# Patient Record
Sex: FEMALE | Race: WHITE | Employment: OTHER | URBAN - METROPOLITAN AREA
[De-identification: names, ages, dates, MRNs, and addresses within clinical notes are randomized per-mention and may not be internally consistent; named-entity substitution may affect disease eponyms.]

---

## 2017-09-14 PROCEDURE — 82607 VITAMIN B-12: CPT | Performed by: UROLOGY

## 2017-09-14 PROCEDURE — 82746 ASSAY OF FOLIC ACID SERUM: CPT | Performed by: UROLOGY

## 2018-06-19 PROCEDURE — 82570 ASSAY OF URINE CREATININE: CPT | Performed by: INTERNAL MEDICINE

## 2018-06-19 PROCEDURE — 36415 COLL VENOUS BLD VENIPUNCTURE: CPT | Performed by: INTERNAL MEDICINE

## 2018-06-19 PROCEDURE — 87086 URINE CULTURE/COLONY COUNT: CPT | Performed by: INTERNAL MEDICINE

## 2018-06-19 PROCEDURE — 84156 ASSAY OF PROTEIN URINE: CPT | Performed by: INTERNAL MEDICINE

## 2018-06-19 PROCEDURE — 81001 URINALYSIS AUTO W/SCOPE: CPT | Performed by: INTERNAL MEDICINE

## 2018-06-19 PROCEDURE — 82043 UR ALBUMIN QUANTITATIVE: CPT | Performed by: INTERNAL MEDICINE

## 2018-07-09 PROCEDURE — 81001 URINALYSIS AUTO W/SCOPE: CPT | Performed by: INTERNAL MEDICINE

## 2018-07-09 PROCEDURE — 87086 URINE CULTURE/COLONY COUNT: CPT | Performed by: INTERNAL MEDICINE

## 2018-09-18 PROCEDURE — 82565 ASSAY OF CREATININE: CPT | Performed by: INTERNAL MEDICINE

## 2018-09-18 PROCEDURE — 84100 ASSAY OF PHOSPHORUS: CPT | Performed by: INTERNAL MEDICINE

## 2018-09-18 PROCEDURE — 82310 ASSAY OF CALCIUM: CPT | Performed by: INTERNAL MEDICINE

## 2018-09-18 PROCEDURE — 83970 ASSAY OF PARATHORMONE: CPT | Performed by: INTERNAL MEDICINE

## 2019-06-06 PROCEDURE — 87086 URINE CULTURE/COLONY COUNT: CPT | Performed by: INTERNAL MEDICINE

## 2019-06-06 PROCEDURE — 81001 URINALYSIS AUTO W/SCOPE: CPT | Performed by: INTERNAL MEDICINE

## 2019-06-06 PROCEDURE — 82610 CYSTATIN C: CPT | Performed by: INTERNAL MEDICINE

## 2019-06-06 PROCEDURE — 83970 ASSAY OF PARATHORMONE: CPT | Performed by: INTERNAL MEDICINE

## 2019-06-06 PROCEDURE — 82310 ASSAY OF CALCIUM: CPT | Performed by: INTERNAL MEDICINE

## 2019-06-06 PROCEDURE — 84100 ASSAY OF PHOSPHORUS: CPT | Performed by: INTERNAL MEDICINE

## 2019-06-06 PROCEDURE — 84156 ASSAY OF PROTEIN URINE: CPT | Performed by: INTERNAL MEDICINE

## 2019-06-06 PROCEDURE — 82565 ASSAY OF CREATININE: CPT | Performed by: INTERNAL MEDICINE

## 2019-07-11 PROCEDURE — 82330 ASSAY OF CALCIUM: CPT | Performed by: INTERNAL MEDICINE

## 2019-08-26 PROCEDURE — 82565 ASSAY OF CREATININE: CPT | Performed by: INTERNAL MEDICINE

## 2019-08-26 PROCEDURE — 82310 ASSAY OF CALCIUM: CPT | Performed by: INTERNAL MEDICINE

## 2019-08-26 PROCEDURE — 83970 ASSAY OF PARATHORMONE: CPT | Performed by: INTERNAL MEDICINE

## 2019-08-26 PROCEDURE — 82330 ASSAY OF CALCIUM: CPT | Performed by: INTERNAL MEDICINE

## 2019-08-26 PROCEDURE — 84100 ASSAY OF PHOSPHORUS: CPT | Performed by: INTERNAL MEDICINE

## 2019-08-26 PROCEDURE — 36415 COLL VENOUS BLD VENIPUNCTURE: CPT | Performed by: INTERNAL MEDICINE

## 2019-09-09 PROCEDURE — 82330 ASSAY OF CALCIUM: CPT | Performed by: INTERNAL MEDICINE

## 2019-09-09 PROCEDURE — 83970 ASSAY OF PARATHORMONE: CPT | Performed by: INTERNAL MEDICINE

## 2019-09-09 PROCEDURE — 84100 ASSAY OF PHOSPHORUS: CPT | Performed by: INTERNAL MEDICINE

## 2019-09-09 PROCEDURE — 82310 ASSAY OF CALCIUM: CPT | Performed by: INTERNAL MEDICINE

## 2019-09-09 PROCEDURE — 82565 ASSAY OF CREATININE: CPT | Performed by: INTERNAL MEDICINE

## 2019-11-18 PROBLEM — E21.0 HYPERPARATHYROIDISM, PRIMARY (HCC): Status: ACTIVE | Noted: 2019-11-18

## 2019-11-21 PROBLEM — M85.80 OSTEOPENIA: Status: ACTIVE | Noted: 2019-11-21

## 2019-12-17 PROBLEM — D69.2 PURPURA (HCC): Status: ACTIVE | Noted: 2019-12-17

## 2019-12-17 PROBLEM — Z85.828 HISTORY OF BASAL CELL CANCER: Status: ACTIVE | Noted: 2019-12-17

## 2019-12-17 PROBLEM — D69.2 PURPURA: Status: ACTIVE | Noted: 2019-12-17

## 2019-12-17 PROBLEM — Z86.007 HISTORY OF SQUAMOUS CELL CARCINOMA IN SITU: Status: ACTIVE | Noted: 2019-12-17

## 2020-11-30 ENCOUNTER — LAB ENCOUNTER (OUTPATIENT)
Dept: LAB | Age: 70
End: 2020-11-30
Attending: DERMATOLOGY
Payer: MEDICARE

## 2020-11-30 DIAGNOSIS — D48.5 NEOPLASM OF UNCERTAIN BEHAVIOR OF SKIN: ICD-10-CM

## 2020-11-30 PROCEDURE — 88342 IMHCHEM/IMCYTCHM 1ST ANTB: CPT

## 2020-12-10 PROBLEM — Z85.828 HISTORY OF BASAL CELL CANCER: Status: RESOLVED | Noted: 2019-12-17 | Resolved: 2020-12-10

## 2021-04-12 PROBLEM — Z86.79 HISTORY OF HIGH BLOOD PRESSURE: Status: ACTIVE | Noted: 2021-04-12

## 2022-08-29 ENCOUNTER — HOSPITAL ENCOUNTER (EMERGENCY)
Facility: HOSPITAL | Age: 72
Discharge: HOME | End: 2022-08-29
Attending: EMERGENCY MEDICINE
Payer: MEDICARE

## 2022-08-29 VITALS
HEIGHT: 65 IN | WEIGHT: 138 LBS | SYSTOLIC BLOOD PRESSURE: 149 MMHG | HEART RATE: 84 BPM | BODY MASS INDEX: 22.99 KG/M2 | TEMPERATURE: 97 F | DIASTOLIC BLOOD PRESSURE: 85 MMHG | RESPIRATION RATE: 18 BRPM | OXYGEN SATURATION: 99 %

## 2022-08-29 DIAGNOSIS — R79.89 ELEVATED TSH: ICD-10-CM

## 2022-08-29 DIAGNOSIS — F32.A DEPRESSION, UNSPECIFIED DEPRESSION TYPE: Primary | ICD-10-CM

## 2022-08-29 LAB
ALBUMIN SERPL-MCNC: 3.9 G/DL (ref 3.4–5)
ALBUMIN/GLOB SERPL: 1 {RATIO} (ref 1–2)
ALP LIVER SERPL-CCNC: 77 U/L
ALT SERPL-CCNC: 23 U/L
AMPHET UR QL SCN: NEGATIVE
ANION GAP SERPL CALC-SCNC: 5 MMOL/L (ref 0–18)
APAP SERPL-MCNC: <2 UG/ML (ref 10–30)
AST SERPL-CCNC: 14 U/L (ref 15–37)
BASOPHILS # BLD AUTO: 0.06 X10(3) UL (ref 0–0.2)
BASOPHILS NFR BLD AUTO: 0.8 %
BENZODIAZ UR QL SCN: NEGATIVE
BILIRUB SERPL-MCNC: 0.6 MG/DL (ref 0.1–2)
BILIRUB UR QL STRIP.AUTO: NEGATIVE
BUN BLD-MCNC: 21 MG/DL (ref 7–18)
CALCIUM BLD-MCNC: 9.8 MG/DL (ref 8.5–10.1)
CANNABINOIDS UR QL SCN: NEGATIVE
CHLORIDE SERPL-SCNC: 103 MMOL/L (ref 98–112)
CLARITY UR REFRACT.AUTO: CLEAR
CO2 SERPL-SCNC: 25 MMOL/L (ref 21–32)
COCAINE UR QL: NEGATIVE
COLOR UR AUTO: YELLOW
CREAT BLD-MCNC: 1.14 MG/DL
CREAT UR-SCNC: 76 MG/DL
EOSINOPHIL # BLD AUTO: 0.07 X10(3) UL (ref 0–0.7)
EOSINOPHIL NFR BLD AUTO: 0.9 %
ERYTHROCYTE [DISTWIDTH] IN BLOOD BY AUTOMATED COUNT: 13.1 %
ETHANOL SERPL-MCNC: <3 MG/DL (ref ?–3)
FLUAV + FLUBV RNA SPEC NAA+PROBE: NEGATIVE
FLUAV + FLUBV RNA SPEC NAA+PROBE: NEGATIVE
GFR SERPLBLD BASED ON 1.73 SQ M-ARVRAT: 51 ML/MIN/1.73M2 (ref 60–?)
GLOBULIN PLAS-MCNC: 4 G/DL (ref 2.8–4.4)
GLUCOSE BLD-MCNC: 110 MG/DL (ref 70–99)
GLUCOSE UR STRIP.AUTO-MCNC: NEGATIVE MG/DL
HCT VFR BLD AUTO: 46.5 %
HGB BLD-MCNC: 15.4 G/DL
HYALINE CASTS #/AREA URNS AUTO: PRESENT /LPF
IMM GRANULOCYTES # BLD AUTO: 0.01 X10(3) UL (ref 0–1)
IMM GRANULOCYTES NFR BLD: 0.1 %
KETONES UR STRIP.AUTO-MCNC: NEGATIVE MG/DL
LYMPHOCYTES # BLD AUTO: 1.38 X10(3) UL (ref 1–4)
LYMPHOCYTES NFR BLD AUTO: 17.4 %
MCH RBC QN AUTO: 33.2 PG (ref 26–34)
MCHC RBC AUTO-ENTMCNC: 33.1 G/DL (ref 31–37)
MCV RBC AUTO: 100.2 FL
MDMA UR QL SCN: NEGATIVE
MONOCYTES # BLD AUTO: 0.7 X10(3) UL (ref 0.1–1)
MONOCYTES NFR BLD AUTO: 8.8 %
NEUTROPHILS # BLD AUTO: 5.72 X10 (3) UL (ref 1.5–7.7)
NEUTROPHILS # BLD AUTO: 5.72 X10(3) UL (ref 1.5–7.7)
NEUTROPHILS NFR BLD AUTO: 72 %
NITRITE UR QL STRIP.AUTO: POSITIVE
OPIATES UR QL SCN: NEGATIVE
OSMOLALITY SERPL CALC.SUM OF ELEC: 280 MOSM/KG (ref 275–295)
OXYCODONE UR QL SCN: NEGATIVE
PH UR STRIP.AUTO: 7 [PH] (ref 5–8)
PLATELET # BLD AUTO: 193 10(3)UL (ref 150–450)
POTASSIUM SERPL-SCNC: 3.6 MMOL/L (ref 3.5–5.1)
PROT SERPL-MCNC: 7.9 G/DL (ref 6.4–8.2)
PROT UR STRIP.AUTO-MCNC: NEGATIVE MG/DL
RBC # BLD AUTO: 4.64 X10(6)UL
RSV RNA SPEC NAA+PROBE: NEGATIVE
SALICYLATES SERPL-MCNC: <1.7 MG/DL (ref 2.8–20)
SARS-COV-2 RNA RESP QL NAA+PROBE: NOT DETECTED
SODIUM SERPL-SCNC: 133 MMOL/L (ref 136–145)
SP GR UR STRIP.AUTO: 1.02 (ref 1–1.03)
UROBILINOGEN UR STRIP.AUTO-MCNC: 0.2 MG/DL
WBC # BLD AUTO: 7.9 X10(3) UL (ref 4–11)

## 2022-08-29 PROCEDURE — 85025 COMPLETE CBC W/AUTO DIFF WBC: CPT | Performed by: EMERGENCY MEDICINE

## 2022-08-29 PROCEDURE — 99285 EMERGENCY DEPT VISIT HI MDM: CPT

## 2022-08-29 PROCEDURE — 81001 URINALYSIS AUTO W/SCOPE: CPT | Performed by: EMERGENCY MEDICINE

## 2022-08-29 PROCEDURE — 87077 CULTURE AEROBIC IDENTIFY: CPT | Performed by: EMERGENCY MEDICINE

## 2022-08-29 PROCEDURE — 87086 URINE CULTURE/COLONY COUNT: CPT | Performed by: EMERGENCY MEDICINE

## 2022-08-29 PROCEDURE — 80307 DRUG TEST PRSMV CHEM ANLYZR: CPT | Performed by: EMERGENCY MEDICINE

## 2022-08-29 PROCEDURE — 36415 COLL VENOUS BLD VENIPUNCTURE: CPT

## 2022-08-29 PROCEDURE — 87186 SC STD MICRODIL/AGAR DIL: CPT | Performed by: EMERGENCY MEDICINE

## 2022-08-29 PROCEDURE — 0241U SARS-COV-2/FLU A AND B/RSV BY PCR (GENEXPERT): CPT | Performed by: EMERGENCY MEDICINE

## 2022-08-29 PROCEDURE — 80179 DRUG ASSAY SALICYLATE: CPT | Performed by: EMERGENCY MEDICINE

## 2022-08-29 PROCEDURE — 80307 DRUG TEST PRSMV CHEM ANLYZR: CPT

## 2022-08-29 PROCEDURE — 80143 DRUG ASSAY ACETAMINOPHEN: CPT | Performed by: EMERGENCY MEDICINE

## 2022-08-29 PROCEDURE — 80053 COMPREHEN METABOLIC PANEL: CPT | Performed by: EMERGENCY MEDICINE

## 2022-08-29 PROCEDURE — 81015 MICROSCOPIC EXAM OF URINE: CPT | Performed by: EMERGENCY MEDICINE

## 2022-08-29 PROCEDURE — 82077 ASSAY SPEC XCP UR&BREATH IA: CPT | Performed by: EMERGENCY MEDICINE

## 2022-08-29 PROCEDURE — 0241U SARS-COV-2/FLU A AND B/RSV BY PCR (GENEXPERT): CPT

## 2022-08-29 PROCEDURE — 85025 COMPLETE CBC W/AUTO DIFF WBC: CPT

## 2022-08-29 RX ORDER — NITROFURANTOIN 25; 75 MG/1; MG/1
100 CAPSULE ORAL 2 TIMES DAILY
Qty: 10 CAPSULE | Refills: 0 | Status: SHIPPED | OUTPATIENT
Start: 2022-08-29 | End: 2022-08-29

## 2022-08-29 RX ORDER — SERTRALINE HYDROCHLORIDE 25 MG/1
25 TABLET, FILM COATED ORAL DAILY
COMMUNITY
Start: 2022-08-26

## 2022-08-29 NOTE — ED NOTES
Writer met with pt and introduced self/role. Pt presents as anxious and depressed. Endorses SI, but no plan and intent. Denies HI, SIB. Pt states persistent feelings of depression. Pt open to assessment. Writer explained available unit resources post assessment if interested. Pt advised to notify staff if in need of anything.  Pt ate lunch; writer removed empty tray upon request.

## 2022-08-29 NOTE — ED INITIAL ASSESSMENT (HPI)
Patient come to ER with , patient states that for the last 2 months has felt \"down\". Also states \"I feel like I want to die\". SI, No HI, no plan. Father committed suicide and sister has bipolar disorder.

## 2022-08-29 NOTE — ED QUICK NOTES
MD Bird Martinez at bedside with patient. Patient handed discharged AVS with outpatient referrals,  and patient verbalized understanding.

## 2022-08-31 RX ORDER — SULFAMETHOXAZOLE AND TRIMETHOPRIM 800; 160 MG/1; MG/1
1 TABLET ORAL 2 TIMES DAILY
Qty: 10 TABLET | Refills: 0 | Status: SHIPPED | OUTPATIENT
Start: 2022-08-31 | End: 2022-09-01

## 2022-09-01 RX ORDER — SULFAMETHOXAZOLE AND TRIMETHOPRIM 800; 160 MG/1; MG/1
1 TABLET ORAL 2 TIMES DAILY
Qty: 10 TABLET | Refills: 0 | Status: SHIPPED | OUTPATIENT
Start: 2022-09-01 | End: 2022-09-06

## 2022-10-19 ENCOUNTER — HOSPITAL ENCOUNTER (EMERGENCY)
Facility: HOSPITAL | Age: 72
Discharge: HOME | End: 2022-10-19
Attending: EMERGENCY MEDICINE
Payer: MEDICARE

## 2022-10-19 VITALS
BODY MASS INDEX: 22.49 KG/M2 | HEIGHT: 65 IN | OXYGEN SATURATION: 98 % | DIASTOLIC BLOOD PRESSURE: 81 MMHG | WEIGHT: 135 LBS | SYSTOLIC BLOOD PRESSURE: 147 MMHG | HEART RATE: 84 BPM | TEMPERATURE: 97 F | RESPIRATION RATE: 19 BRPM

## 2022-10-19 DIAGNOSIS — R55 SYNCOPE, NEAR: Primary | ICD-10-CM

## 2022-10-19 LAB
ALBUMIN SERPL-MCNC: 3.4 G/DL (ref 3.4–5)
ALBUMIN/GLOB SERPL: 0.8 {RATIO} (ref 1–2)
ALP LIVER SERPL-CCNC: 68 U/L
ALT SERPL-CCNC: 23 U/L
ANION GAP SERPL CALC-SCNC: 7 MMOL/L (ref 0–18)
AST SERPL-CCNC: 11 U/L (ref 15–37)
ATRIAL RATE: 83 BPM
BASOPHILS # BLD AUTO: 0.05 X10(3) UL (ref 0–0.2)
BASOPHILS NFR BLD AUTO: 0.4 %
BILIRUB SERPL-MCNC: 0.6 MG/DL (ref 0.1–2)
BUN BLD-MCNC: 27 MG/DL (ref 7–18)
CALCIUM BLD-MCNC: 9.8 MG/DL (ref 8.5–10.1)
CHLORIDE SERPL-SCNC: 108 MMOL/L (ref 98–112)
CO2 SERPL-SCNC: 23 MMOL/L (ref 21–32)
CREAT BLD-MCNC: 1.37 MG/DL
EOSINOPHIL # BLD AUTO: 0.06 X10(3) UL (ref 0–0.7)
EOSINOPHIL NFR BLD AUTO: 0.5 %
ERYTHROCYTE [DISTWIDTH] IN BLOOD BY AUTOMATED COUNT: 13.3 %
GFR SERPLBLD BASED ON 1.73 SQ M-ARVRAT: 41 ML/MIN/1.73M2 (ref 60–?)
GLOBULIN PLAS-MCNC: 4.3 G/DL (ref 2.8–4.4)
GLUCOSE BLD-MCNC: 121 MG/DL (ref 70–99)
GLUCOSE BLD-MCNC: 129 MG/DL (ref 70–99)
HCT VFR BLD AUTO: 43 %
HGB BLD-MCNC: 15.1 G/DL
IMM GRANULOCYTES # BLD AUTO: 0.05 X10(3) UL (ref 0–1)
IMM GRANULOCYTES NFR BLD: 0.4 %
LYMPHOCYTES # BLD AUTO: 0.93 X10(3) UL (ref 1–4)
LYMPHOCYTES NFR BLD AUTO: 7.5 %
MCH RBC QN AUTO: 35 PG (ref 26–34)
MCHC RBC AUTO-ENTMCNC: 35.1 G/DL (ref 31–37)
MCV RBC AUTO: 99.5 FL
MONOCYTES # BLD AUTO: 0.97 X10(3) UL (ref 0.1–1)
MONOCYTES NFR BLD AUTO: 7.9 %
NEUTROPHILS # BLD AUTO: 10.29 X10 (3) UL (ref 1.5–7.7)
NEUTROPHILS # BLD AUTO: 10.29 X10(3) UL (ref 1.5–7.7)
NEUTROPHILS NFR BLD AUTO: 83.3 %
OSMOLALITY SERPL CALC.SUM OF ELEC: 293 MOSM/KG (ref 275–295)
P AXIS: 61 DEGREES
P-R INTERVAL: 126 MS
PLATELET # BLD AUTO: 182 10(3)UL (ref 150–450)
POTASSIUM SERPL-SCNC: 4.2 MMOL/L (ref 3.5–5.1)
PROT SERPL-MCNC: 7.7 G/DL (ref 6.4–8.2)
Q-T INTERVAL: 376 MS
QRS DURATION: 72 MS
QTC CALCULATION (BEZET): 441 MS
R AXIS: 40 DEGREES
RBC # BLD AUTO: 4.32 X10(6)UL
SARS-COV-2 RNA RESP QL NAA+PROBE: NOT DETECTED
SODIUM SERPL-SCNC: 138 MMOL/L (ref 136–145)
T AXIS: 61 DEGREES
TROPONIN I HIGH SENSITIVITY: 8 NG/L
VENTRICULAR RATE: 83 BPM
WBC # BLD AUTO: 12.4 X10(3) UL (ref 4–11)

## 2022-10-19 PROCEDURE — 99285 EMERGENCY DEPT VISIT HI MDM: CPT

## 2022-10-19 PROCEDURE — 93010 ELECTROCARDIOGRAM REPORT: CPT | Performed by: EMERGENCY MEDICINE

## 2022-10-19 PROCEDURE — 96360 HYDRATION IV INFUSION INIT: CPT

## 2022-10-19 PROCEDURE — 82962 GLUCOSE BLOOD TEST: CPT

## 2022-10-19 PROCEDURE — 93005 ELECTROCARDIOGRAM TRACING: CPT

## 2022-10-19 PROCEDURE — 84484 ASSAY OF TROPONIN QUANT: CPT | Performed by: EMERGENCY MEDICINE

## 2022-10-19 PROCEDURE — 80053 COMPREHEN METABOLIC PANEL: CPT | Performed by: EMERGENCY MEDICINE

## 2022-10-19 PROCEDURE — 85025 COMPLETE CBC W/AUTO DIFF WBC: CPT | Performed by: EMERGENCY MEDICINE

## 2022-10-19 NOTE — ED INITIAL ASSESSMENT (HPI)
Patient received via [facility] for intermittant dizziness and disorientation x 2 days. Patient denies injury or additional symptoms. Patient denies pain.

## 2022-10-20 ENCOUNTER — APPOINTMENT (OUTPATIENT)
Dept: CT IMAGING | Facility: HOSPITAL | Age: 72
End: 2022-10-20
Attending: EMERGENCY MEDICINE
Payer: MEDICARE

## 2022-10-20 ENCOUNTER — APPOINTMENT (OUTPATIENT)
Dept: CV DIAGNOSTICS | Facility: HOSPITAL | Age: 72
End: 2022-10-20
Attending: HOSPITALIST
Payer: MEDICARE

## 2022-10-20 ENCOUNTER — HOSPITAL ENCOUNTER (INPATIENT)
Facility: HOSPITAL | Age: 72
LOS: 3 days | End: 2022-10-25
Attending: EMERGENCY MEDICINE | Admitting: INTERNAL MEDICINE
Payer: MEDICARE

## 2022-10-20 DIAGNOSIS — R55 SYNCOPE, NEAR: Primary | ICD-10-CM

## 2022-10-20 DIAGNOSIS — N39.0 URINARY TRACT INFECTION WITHOUT HEMATURIA, SITE UNSPECIFIED: ICD-10-CM

## 2022-10-20 LAB
ALBUMIN SERPL-MCNC: 3.2 G/DL (ref 3.4–5)
ALBUMIN/GLOB SERPL: 0.8 {RATIO} (ref 1–2)
ALP LIVER SERPL-CCNC: 63 U/L
ALT SERPL-CCNC: 18 U/L
ANION GAP SERPL CALC-SCNC: 7 MMOL/L (ref 0–18)
AST SERPL-CCNC: 14 U/L (ref 15–37)
BASOPHILS # BLD AUTO: 0.05 X10(3) UL (ref 0–0.2)
BASOPHILS NFR BLD AUTO: 0.3 %
BILIRUB SERPL-MCNC: 0.6 MG/DL (ref 0.1–2)
BILIRUB UR QL STRIP.AUTO: NEGATIVE
BUN BLD-MCNC: 19 MG/DL (ref 7–18)
CALCIUM BLD-MCNC: 9.5 MG/DL (ref 8.5–10.1)
CHLORIDE SERPL-SCNC: 104 MMOL/L (ref 98–112)
CO2 SERPL-SCNC: 23 MMOL/L (ref 21–32)
COLOR UR AUTO: YELLOW
CREAT BLD-MCNC: 1.21 MG/DL
D DIMER PPP FEU-MCNC: 0.54 UG/ML FEU (ref ?–0.73)
EOSINOPHIL # BLD AUTO: 0.01 X10(3) UL (ref 0–0.7)
EOSINOPHIL NFR BLD AUTO: 0.1 %
ERYTHROCYTE [DISTWIDTH] IN BLOOD BY AUTOMATED COUNT: 13.3 %
GFR SERPLBLD BASED ON 1.73 SQ M-ARVRAT: 47 ML/MIN/1.73M2 (ref 60–?)
GLOBULIN PLAS-MCNC: 3.9 G/DL (ref 2.8–4.4)
GLUCOSE BLD-MCNC: 168 MG/DL (ref 70–99)
GLUCOSE UR STRIP.AUTO-MCNC: NEGATIVE MG/DL
HCT VFR BLD AUTO: 39.3 %
HGB BLD-MCNC: 13.7 G/DL
IMM GRANULOCYTES # BLD AUTO: 0.09 X10(3) UL (ref 0–1)
IMM GRANULOCYTES NFR BLD: 0.6 %
LYMPHOCYTES # BLD AUTO: 0.58 X10(3) UL (ref 1–4)
LYMPHOCYTES NFR BLD AUTO: 3.9 %
MCH RBC QN AUTO: 33.9 PG (ref 26–34)
MCHC RBC AUTO-ENTMCNC: 34.9 G/DL (ref 31–37)
MCV RBC AUTO: 97.3 FL
MONOCYTES # BLD AUTO: 1.2 X10(3) UL (ref 0.1–1)
MONOCYTES NFR BLD AUTO: 8.1 %
NEUTROPHILS # BLD AUTO: 12.83 X10 (3) UL (ref 1.5–7.7)
NEUTROPHILS # BLD AUTO: 12.83 X10(3) UL (ref 1.5–7.7)
NEUTROPHILS NFR BLD AUTO: 87 %
NITRITE UR QL STRIP.AUTO: POSITIVE
OSMOLALITY SERPL CALC.SUM OF ELEC: 284 MOSM/KG (ref 275–295)
PH UR STRIP.AUTO: 6 [PH] (ref 5–8)
PLATELET # BLD AUTO: 161 10(3)UL (ref 150–450)
POTASSIUM SERPL-SCNC: 3.6 MMOL/L (ref 3.5–5.1)
PROCALCITONIN SERPL-MCNC: 0.2 NG/ML (ref ?–0.16)
PROT SERPL-MCNC: 7.1 G/DL (ref 6.4–8.2)
PROT UR STRIP.AUTO-MCNC: 30 MG/DL
RBC # BLD AUTO: 4.04 X10(6)UL
SARS-COV-2 RNA RESP QL NAA+PROBE: NOT DETECTED
SODIUM SERPL-SCNC: 134 MMOL/L (ref 136–145)
SP GR UR STRIP.AUTO: 1.01 (ref 1–1.03)
TROPONIN I HIGH SENSITIVITY: 11 NG/L
UROBILINOGEN UR STRIP.AUTO-MCNC: <2 MG/DL
WBC # BLD AUTO: 14.8 X10(3) UL (ref 4–11)
WBC #/AREA URNS AUTO: >50 /HPF

## 2022-10-20 PROCEDURE — 70450 CT HEAD/BRAIN W/O DYE: CPT | Performed by: EMERGENCY MEDICINE

## 2022-10-20 PROCEDURE — 93306 TTE W/DOPPLER COMPLETE: CPT | Performed by: HOSPITALIST

## 2022-10-20 RX ORDER — CLONAZEPAM 0.5 MG/1
0.5 TABLET ORAL NIGHTLY
COMMUNITY
Start: 2022-10-17 | End: 2022-10-25

## 2022-10-20 RX ORDER — HEPARIN SODIUM 5000 [USP'U]/ML
5000 INJECTION, SOLUTION INTRAVENOUS; SUBCUTANEOUS EVERY 8 HOURS SCHEDULED
Status: DISCONTINUED | OUTPATIENT
Start: 2022-10-20 | End: 2022-10-25

## 2022-10-20 RX ORDER — GARLIC EXTRACT 500 MG
1 CAPSULE ORAL DAILY
COMMUNITY

## 2022-10-20 RX ORDER — FLUOXETINE HYDROCHLORIDE 20 MG/1
20 CAPSULE ORAL EVERY MORNING
Status: DISCONTINUED | OUTPATIENT
Start: 2022-10-21 | End: 2022-10-25

## 2022-10-20 RX ORDER — ONDANSETRON 2 MG/ML
4 INJECTION INTRAMUSCULAR; INTRAVENOUS EVERY 6 HOURS PRN
Status: DISCONTINUED | OUTPATIENT
Start: 2022-10-20 | End: 2022-10-25

## 2022-10-20 RX ORDER — METOCLOPRAMIDE HYDROCHLORIDE 5 MG/ML
5 INJECTION INTRAMUSCULAR; INTRAVENOUS EVERY 8 HOURS PRN
Status: DISCONTINUED | OUTPATIENT
Start: 2022-10-20 | End: 2022-10-24

## 2022-10-20 RX ORDER — FLUOXETINE HYDROCHLORIDE 20 MG/1
20 CAPSULE ORAL EVERY MORNING
COMMUNITY
Start: 2022-10-17 | End: 2022-11-04

## 2022-10-20 RX ORDER — POTASSIUM CHLORIDE 20 MEQ/1
40 TABLET, EXTENDED RELEASE ORAL EVERY 4 HOURS
Status: COMPLETED | OUTPATIENT
Start: 2022-10-20 | End: 2022-10-20

## 2022-10-20 RX ORDER — TRAZODONE HYDROCHLORIDE 50 MG/1
50 TABLET ORAL NIGHTLY
Status: DISCONTINUED | OUTPATIENT
Start: 2022-10-20 | End: 2022-10-23

## 2022-10-20 RX ORDER — ATORVASTATIN CALCIUM 20 MG/1
20 TABLET, FILM COATED ORAL NIGHTLY
Status: DISCONTINUED | OUTPATIENT
Start: 2022-10-20 | End: 2022-10-25

## 2022-10-20 RX ORDER — ACETAMINOPHEN 325 MG/1
650 TABLET ORAL EVERY 6 HOURS PRN
Status: DISCONTINUED | OUTPATIENT
Start: 2022-10-20 | End: 2022-10-25

## 2022-10-20 NOTE — ED QUICK NOTES
Orders for admission, patient is aware of plan and ready to go upstairs. Any questions, please call ED RN Amparo Arriola at extension [phone].      Patient Covid vaccination status: Fully vaccinated     COVID Test Ordered in ED: Rapid SARS-CoV-2 by PCR    COVID Suspicion at Admission: N/A    Running Infusions:  None    Mental Status/LOC at time of transport: alert    Other pertinent information:   CIWA score: N/A   NIH score:  N/A

## 2022-10-20 NOTE — PLAN OF CARE
NURSING ADMISSION NOTE      Patient admitted via Cart  Oriented to room. Safety precautions initiated. Bed in low position. Call light in reach. Admitted from ER c/o dizziness and lightheadedness  Denies any chest pain,no sob,room air O2 sats 99%  Seen by Dr Abena Childers ,echo done  Orthostatics negative  Will monitor    Problem: CARDIOVASCULAR - ADULT  Goal: Maintains optimal cardiac output and hemodynamic stability  Description: INTERVENTIONS:  - Monitor vital signs, rhythm, and trends  - Monitor for bleeding, hypotension and signs of decreased cardiac output  - Evaluate effectiveness of vasoactive medications to optimize hemodynamic stability  - Monitor arterial and/or venous puncture sites for bleeding and/or hematoma  - Assess quality of pulses, skin color and temperature  - Assess for signs of decreased coronary artery perfusion - ex.  Angina  - Evaluate fluid balance, assess for edema, trend weights  Outcome: Progressing  Goal: Absence of cardiac arrhythmias or at baseline  Description: INTERVENTIONS:  - Continuous cardiac monitoring, monitor vital signs, obtain 12 lead EKG if indicated  - Evaluate effectiveness of antiarrhythmic and heart rate control medications as ordered  - Initiate emergency measures for life threatening arrhythmias  - Monitor electrolytes and administer replacement therapy as ordered  Outcome: Progressing

## 2022-10-20 NOTE — ED INITIAL ASSESSMENT (HPI)
Pt states she was seen in ER yesterday for syncope. Pt states today she was going to [facility] for a heart monitor and had another syncopal episode after being dizzy. No trauma noted.

## 2022-10-20 NOTE — PROGRESS NOTES
10/20/22 1556 10/20/22 1600 10/20/22 1602   Vital Signs   /61 134/65 132/70   MAP (mmHg) 78 83 79   BP Location Right arm Right arm Right arm   BP Method Automatic Automatic Automatic   Patient Position Lying Sitting Standing     Pt denies dizziness and lightheadedness.

## 2022-10-21 ENCOUNTER — NURSE ONLY (OUTPATIENT)
Dept: ELECTROPHYSIOLOGY | Facility: HOSPITAL | Age: 72
End: 2022-10-21
Attending: Other
Payer: MEDICARE

## 2022-10-21 ENCOUNTER — APPOINTMENT (OUTPATIENT)
Dept: GENERAL RADIOLOGY | Facility: HOSPITAL | Age: 72
End: 2022-10-21
Attending: INTERNAL MEDICINE
Payer: MEDICARE

## 2022-10-21 LAB
ANION GAP SERPL CALC-SCNC: 5 MMOL/L (ref 0–18)
ATRIAL RATE: 105 BPM
BASOPHILS # BLD AUTO: 0.04 X10(3) UL (ref 0–0.2)
BASOPHILS NFR BLD AUTO: 0.4 %
BUN BLD-MCNC: 14 MG/DL (ref 7–18)
CALCIUM BLD-MCNC: 9.1 MG/DL (ref 8.5–10.1)
CHLORIDE SERPL-SCNC: 111 MMOL/L (ref 98–112)
CO2 SERPL-SCNC: 23 MMOL/L (ref 21–32)
CREAT BLD-MCNC: 0.9 MG/DL
EOSINOPHIL # BLD AUTO: 0.04 X10(3) UL (ref 0–0.7)
EOSINOPHIL NFR BLD AUTO: 0.4 %
ERYTHROCYTE [DISTWIDTH] IN BLOOD BY AUTOMATED COUNT: 13.6 %
GFR SERPLBLD BASED ON 1.73 SQ M-ARVRAT: 68 ML/MIN/1.73M2 (ref 60–?)
GLUCOSE BLD-MCNC: 113 MG/DL (ref 70–99)
HCT VFR BLD AUTO: 37.5 %
HGB BLD-MCNC: 12.8 G/DL
IMM GRANULOCYTES # BLD AUTO: 0.04 X10(3) UL (ref 0–1)
IMM GRANULOCYTES NFR BLD: 0.4 %
LYMPHOCYTES # BLD AUTO: 0.74 X10(3) UL (ref 1–4)
LYMPHOCYTES NFR BLD AUTO: 7.5 %
MCH RBC QN AUTO: 34.6 PG (ref 26–34)
MCHC RBC AUTO-ENTMCNC: 34.1 G/DL (ref 31–37)
MCV RBC AUTO: 101.4 FL
MONOCYTES # BLD AUTO: 0.91 X10(3) UL (ref 0.1–1)
MONOCYTES NFR BLD AUTO: 9.3 %
NEUTROPHILS # BLD AUTO: 8.05 X10 (3) UL (ref 1.5–7.7)
NEUTROPHILS # BLD AUTO: 8.05 X10(3) UL (ref 1.5–7.7)
NEUTROPHILS NFR BLD AUTO: 82 %
OSMOLALITY SERPL CALC.SUM OF ELEC: 289 MOSM/KG (ref 275–295)
P AXIS: 78 DEGREES
P-R INTERVAL: 128 MS
PLATELET # BLD AUTO: 151 10(3)UL (ref 150–450)
POTASSIUM SERPL-SCNC: 4.3 MMOL/L (ref 3.5–5.1)
POTASSIUM SERPL-SCNC: 4.6 MMOL/L (ref 3.5–5.1)
Q-T INTERVAL: 340 MS
QRS DURATION: 80 MS
QTC CALCULATION (BEZET): 449 MS
R AXIS: 34 DEGREES
RBC # BLD AUTO: 3.7 X10(6)UL
SODIUM SERPL-SCNC: 139 MMOL/L (ref 136–145)
T AXIS: 63 DEGREES
VENTRICULAR RATE: 105 BPM
WBC # BLD AUTO: 9.8 X10(3) UL (ref 4–11)

## 2022-10-21 PROCEDURE — 71046 X-RAY EXAM CHEST 2 VIEWS: CPT | Performed by: INTERNAL MEDICINE

## 2022-10-21 PROCEDURE — 99223 1ST HOSP IP/OBS HIGH 75: CPT | Performed by: OTHER

## 2022-10-21 PROCEDURE — 95816 EEG AWAKE AND DROWSY: CPT | Performed by: OTHER

## 2022-10-21 NOTE — PLAN OF CARE
Awake,slightly confused this afternoon  Denies any pain,no sob  Room air  No dizziness or lightheadedness  EEG done, Dr Tono Keene rounded  Urostomy intact noted cloudy and milky urine output on urostomy site . Urostomy site cleaned  and bag changed  Continue IV Ceftriaxone  Will continue to monitor    Problem: CARDIOVASCULAR - ADULT  Goal: Maintains optimal cardiac output and hemodynamic stability  Description: INTERVENTIONS:  - Monitor vital signs, rhythm, and trends  - Monitor for bleeding, hypotension and signs of decreased cardiac output  - Evaluate effectiveness of vasoactive medications to optimize hemodynamic stability  - Monitor arterial and/or venous puncture sites for bleeding and/or hematoma  - Assess quality of pulses, skin color and temperature  - Assess for signs of decreased coronary artery perfusion - ex. Angina  - Evaluate fluid balance, assess for edema, trend weights  Outcome: Progressing  Goal: Absence of cardiac arrhythmias or at baseline  Description: INTERVENTIONS:  - Continuous cardiac monitoring, monitor vital signs, obtain 12 lead EKG if indicated  - Evaluate effectiveness of antiarrhythmic and heart rate control medications as ordered  - Initiate emergency measures for life threatening arrhythmias  - Monitor electrolytes and administer replacement therapy as ordered  Outcome: Progressing     Problem: NEUROLOGICAL - ADULT  Goal: Achieves stable or improved neurological status  Description: INTERVENTIONS  - Assess for and report changes in neurological status  - Initiate measures to prevent increased intracranial pressure  - Maintain blood pressure and fluid volume within ordered parameters to optimize cerebral perfusion and minimize risk of hemorrhage  - Monitor temperature, glucose, and sodium.  Initiate appropriate interventions as ordered  Outcome: Progressing

## 2022-10-21 NOTE — CM/SW NOTE
10/21/22 1100   CM/SW Referral Data   Referral Source Social Work (self-referral); Physician;Nurse   Reason for Referral Advance Directives/POLST   Informant Patient;EMR   Patient Info   Patient's Current Mental Status at Time of Assessment Oriented; Alert   Patient's Home Environment Condo/Apt with elevator   Patient lives with Spouse/Significant other   Discharge Needs   Anticipated D/C needs To be determined     Patient is a 68 y/o female who admitted with dizziness, near syncope, and urinary tract infection without hematuria. SW acknowledged order for POLST. Met with patient, who was alert and oriented, to complete assessment. Patient lives in an apartment with her , she normally ambulates independently. Discussed POLST form, she declined to fill out at this time and would like to further review form before signing. SW left form at patient's bedside. SW will remain available.      Amairani Sandoval, [facility]  Discharge Planner  [phone]

## 2022-10-21 NOTE — PLAN OF CARE
Received pt at 1900. Pt a & O x 4. Friendly, cooperative, follows commands. Room air, SR on tele orthos(-). Cardiac diet, continent, up with standby assist. Pt denies SOB, chest pain, dizziness, or feeling light headed. Pt informed neuro consult was put in and will see her tomorrow. Pt verbalized understanding. Pt also agrees to safety plan; bed in low position, alarm on, call light within reach. Problem: CARDIOVASCULAR - ADULT  Goal: Maintains optimal cardiac output and hemodynamic stability  Description: INTERVENTIONS:  - Monitor vital signs, rhythm, and trends  - Monitor for bleeding, hypotension and signs of decreased cardiac output  - Evaluate effectiveness of vasoactive medications to optimize hemodynamic stability  - Monitor arterial and/or venous puncture sites for bleeding and/or hematoma  - Assess quality of pulses, skin color and temperature  - Assess for signs of decreased coronary artery perfusion - ex. Angina  - Evaluate fluid balance, assess for edema, trend weights  Outcome: Progressing  Goal: Absence of cardiac arrhythmias or at baseline  Description: INTERVENTIONS:  - Continuous cardiac monitoring, monitor vital signs, obtain 12 lead EKG if indicated  - Evaluate effectiveness of antiarrhythmic and heart rate control medications as ordered  - Initiate emergency measures for life threatening arrhythmias  - Monitor electrolytes and administer replacement therapy as ordered  Outcome: Progressing     Problem: NEUROLOGICAL - ADULT  Goal: Achieves stable or improved neurological status  Description: INTERVENTIONS  - Assess for and report changes in neurological status  - Initiate measures to prevent increased intracranial pressure  - Maintain blood pressure and fluid volume within ordered parameters to optimize cerebral perfusion and minimize risk of hemorrhage  - Monitor temperature, glucose, and sodium.  Initiate appropriate interventions as ordered  Outcome: Progressing

## 2022-10-22 ENCOUNTER — APPOINTMENT (OUTPATIENT)
Dept: MRI IMAGING | Facility: HOSPITAL | Age: 72
End: 2022-10-22
Attending: Other
Payer: MEDICARE

## 2022-10-22 LAB
ANION GAP SERPL CALC-SCNC: 6 MMOL/L (ref 0–18)
BASOPHILS # BLD AUTO: 0.04 X10(3) UL (ref 0–0.2)
BASOPHILS NFR BLD AUTO: 0.6 %
BUN BLD-MCNC: 17 MG/DL (ref 7–18)
CALCIUM BLD-MCNC: 9.8 MG/DL (ref 8.5–10.1)
CHLORIDE SERPL-SCNC: 108 MMOL/L (ref 98–112)
CO2 SERPL-SCNC: 23 MMOL/L (ref 21–32)
CREAT BLD-MCNC: 1 MG/DL
EOSINOPHIL # BLD AUTO: 0.12 X10(3) UL (ref 0–0.7)
EOSINOPHIL NFR BLD AUTO: 1.8 %
ERYTHROCYTE [DISTWIDTH] IN BLOOD BY AUTOMATED COUNT: 13.7 %
GFR SERPLBLD BASED ON 1.73 SQ M-ARVRAT: 59 ML/MIN/1.73M2 (ref 60–?)
GLUCOSE BLD-MCNC: 94 MG/DL (ref 70–99)
HCT VFR BLD AUTO: 38.4 %
HGB BLD-MCNC: 12.9 G/DL
IMM GRANULOCYTES # BLD AUTO: 0.03 X10(3) UL (ref 0–1)
IMM GRANULOCYTES NFR BLD: 0.4 %
LYMPHOCYTES # BLD AUTO: 1.39 X10(3) UL (ref 1–4)
LYMPHOCYTES NFR BLD AUTO: 20.4 %
MAGNESIUM SERPL-MCNC: 2.4 MG/DL (ref 1.6–2.6)
MCH RBC QN AUTO: 33.9 PG (ref 26–34)
MCHC RBC AUTO-ENTMCNC: 33.6 G/DL (ref 31–37)
MCV RBC AUTO: 101.1 FL
MONOCYTES # BLD AUTO: 0.73 X10(3) UL (ref 0.1–1)
MONOCYTES NFR BLD AUTO: 10.7 %
NEUTROPHILS # BLD AUTO: 4.52 X10 (3) UL (ref 1.5–7.7)
NEUTROPHILS # BLD AUTO: 4.52 X10(3) UL (ref 1.5–7.7)
NEUTROPHILS NFR BLD AUTO: 66.1 %
OSMOLALITY SERPL CALC.SUM OF ELEC: 285 MOSM/KG (ref 275–295)
PLATELET # BLD AUTO: 149 10(3)UL (ref 150–450)
POTASSIUM SERPL-SCNC: 4 MMOL/L (ref 3.5–5.1)
RBC # BLD AUTO: 3.8 X10(6)UL
SODIUM SERPL-SCNC: 137 MMOL/L (ref 136–145)
WBC # BLD AUTO: 6.8 X10(3) UL (ref 4–11)

## 2022-10-22 PROCEDURE — 70551 MRI BRAIN STEM W/O DYE: CPT | Performed by: OTHER

## 2022-10-22 PROCEDURE — 99233 SBSQ HOSP IP/OBS HIGH 50: CPT | Performed by: OTHER

## 2022-10-22 RX ORDER — SODIUM CHLORIDE 9 MG/ML
INJECTION, SOLUTION INTRAVENOUS CONTINUOUS
Status: DISCONTINUED | OUTPATIENT
Start: 2022-10-22 | End: 2022-10-24

## 2022-10-22 NOTE — PLAN OF CARE
RN SHIFT NOTE    Assumed care of pt at 0700. Pt denies pain at this time. Patient is alert and oriented x 3-4, forgetfulness and some short term memory loss present. Patient is NSR on tele, S1 and S2 present. Heart rate was tachy with ambulation. MD notified. 500cc bolus of 0.9 NS given at 1230. Lung sounds clear. No edema noted. Last BM on 10/22. Abdomen soft and round. Patient has urostomy. Skin is intact. Call light within reach. Patient sitting up in chair. Care needs have been met at this time. 1830: Patient had short run of SVT, HR in the 150s. Dr. Clare Wisdom notified. Order for mag level placed. POC: IV Ancef q8 hrs, MRI of brain, encourage oral fluids, IVF fluids 0.9 NS running at 83ml/hr.       Problem: Patient/Family Goals  Goal: Patient/Family Long Term Goal  Description: Patient's Long Term Goal: To remain free of dizziness  Interventions:  - maintain fluid intake  -maintain free of infection  - Take medications as prescribed  - See additional Care Plan goals for specific interventions  10/22/2022 1533 by Bobbi Choi RN  Outcome: Progressing  10/22/2022 1533 by Bobbi Choi RN  Outcome: Progressing  Goal: Patient/Family Short Term Goal  Description: Patient's Short Term Goal: To discharge home  Interventions:   - ambulate in shea without put  - maintain fluid intake  - IV antibiotics   - MRI of brain  - See additional Care Plan goals for specific interventions  10/22/2022 1533 by Bobbi Choi RN  Outcome: Progressing  10/22/2022 1533 by Bobbi Choi RN  Outcome: Progressing     Problem: CARDIOVASCULAR - ADULT  Goal: Maintains optimal cardiac output and hemodynamic stability  Description: INTERVENTIONS:  - Monitor vital signs, rhythm, and trends  - Monitor for bleeding, hypotension and signs of decreased cardiac output  - Evaluate effectiveness of vasoactive medications to optimize hemodynamic stability  - Monitor arterial and/or venous puncture sites for bleeding and/or hematoma  - Assess quality of pulses, skin color and temperature  - Assess for signs of decreased coronary artery perfusion - ex. Angina  - Evaluate fluid balance, assess for edema, trend weights  10/22/2022 1533 by Berna Zamora RN  Outcome: Progressing  10/22/2022 1533 by Berna Zamora RN  Outcome: Progressing  Goal: Absence of cardiac arrhythmias or at baseline  Description: INTERVENTIONS:  - Continuous cardiac monitoring, monitor vital signs, obtain 12 lead EKG if indicated  - Evaluate effectiveness of antiarrhythmic and heart rate control medications as ordered  - Initiate emergency measures for life threatening arrhythmias  - Monitor electrolytes and administer replacement therapy as ordered  10/22/2022 1533 by Berna Zamora RN  Outcome: Progressing  10/22/2022 1533 by Berna Zamora RN  Outcome: Progressing     Problem: NEUROLOGICAL - ADULT  Goal: Achieves stable or improved neurological status  Description: INTERVENTIONS  - Assess for and report changes in neurological status  - Initiate measures to prevent increased intracranial pressure  - Maintain blood pressure and fluid volume within ordered parameters to optimize cerebral perfusion and minimize risk of hemorrhage  - Monitor temperature, glucose, and sodium.  Initiate appropriate interventions as ordered  10/22/2022 1533 by Berna Zamora RN  Outcome: Progressing  10/22/2022 1533 by Berna Zamora RN  Outcome: Progressing

## 2022-10-22 NOTE — PROCEDURES
[address] EEG report    Name: Juan Jose Abernathy    Date of Study: 10/21/2022      Routine EEG Report    Ordering Physician: Chaim Caldera MD                             Primary Care Physician: Willis Dean MD    Technical Aspects:   1. 10-20 International System   2. Referential and Bipolar and monopolar recording montage   3. Routine recording (awake and drowsy)   4. Portable -C. E.S.   5. Impedance - 10 kilohms or less     Clinical History    cefTRIAXone (Rocephin) 1 g in D5W 100 mL IVPB-ADD, 1 g, Intravenous, Q24H  [COMPLETED] sodium chloride 0.9 % IV bolus 1,000 mL, 1,000 mL, Intravenous, Once  FLUoxetine (PROzac) cap 20 mg, 20 mg, Oral, QAM  atorvastatin (Lipitor) tab 20 mg, 20 mg, Oral, Nightly  heparin (Porcine) 5000 UNIT/ML injection 5,000 Units, 5,000 Units, Subcutaneous, Q8H OFE  ondansetron (Zofran) 4 MG/2ML injection 4 mg, 4 mg, Intravenous, Q6H PRN  metoclopramide (Reglan) 5 mg/mL injection 5 mg, 5 mg, Intravenous, Q8H PRN  [COMPLETED] cefTRIAXone (Rocephin) 1 g in D5W 100 mL IVPB-ADD, 1 g, Intravenous, Once  [COMPLETED] Perflutren Lipid Microsphere (DEFINITY) 6.52 MG/ML injection 1.5 mL, 1.5 mL, Intravenous, ONCE PRN  [COMPLETED] potassium chloride (K-Dur) tab 40 mEq, 40 mEq, Oral, Q4H  traZODone (Desyrel) tab 50 mg, 50 mg, Oral, Nightly  acetaminophen (Tylenol) tab 650 mg, 650 mg, Oral, Q6H PRN        DX: PRESYNCOPE  HX: [address] ON 10/20 WITH C/O RECURRENT SYNCOPE. THE DAY BEFORE ADMISSION SHE FELT DIZZY AND WAS ABLE TO LOWER HERSELF TO THE GROUND WITHOUT LOC. SHE WENT TO ER AND SYMPTOMS THOUGHT TO BE FROM [facility] AND SHE WAS DISCHARGED HOME.   IN THE MORNING SHE AWOKE WITH SOME DISORIENTATION AND HAD ANOTHER PRESYNCOPAL EPISODE WHILE ON HER WAY TO MD  PMH: CKD, HLD, OSTEOPENIA, BLADDER CANCER,  RX: LIPITOR, ROCEPHIN, PROZAC, PORCINE, DESYREL, TYLENOL  PT STATE: A&OX3  AWAKE  NO HV OR PS PERFORMED  CT 10/21: NO ACUTE INTRACRANIAL HEMORRHAGE    Interpretation    Background activity: Posterior dominant background rhythm consisted of 9 Hz, 20-40 uV alpha rhythm, which was reactive to eye closure and eye opening    Slowing: Intermittent, irregular, bitemporal, 40-80 uV, 2-3 Hz delta slowing was noted     Sleep: Sleep was not recorded; brief periods of drowsiness were noted    Hyperventilation and photic stimulation were not performed     Epileptiform activity: None    Seizures: none    Events: none    Impression: This EEG is mildly abnormal due to intermittent bitemporal slowing superimposed on normal posterior dominant background rhythm. This is a non-specific finding and may be seen in encephalopathy or suggest global cerebral dysfunction. No epileptiform activity, abnormal slowing or clinical or electrographic seizures were noted on this awake and drowsy recording.      Topher Murry MD, Neurology  [facility]  Pager [phone]  10/21/2022

## 2022-10-22 NOTE — PROGRESS NOTES
10/22/22 1209 10/22/22 1212 10/22/22 1213   Vital Signs   /54 109/65 92/60   MAP (mmHg) 65 72 67   BP Location Right arm Right arm Right arm   BP Method Automatic Automatic Automatic   Patient Position Lying Sitting Standing   orthostatic BP

## 2022-10-22 NOTE — PLAN OF CARE
A&Ox2. Confused at times. DNAR. Denies pain. Room air. On IV ABX. X1 with walker. L extended iv does not draw back blood. Low grade temp Tylenol given. Temp wnl this morning. Urostomy. WBC WNL this morning. Problem: Patient/Family Goals  Goal: Patient/Family Long Term Goal  Description: Patient's Long Term Goal:     Interventions:  -   - See additional Care Plan goals for specific interventions  Outcome: Progressing  Goal: Patient/Family Short Term Goal  Description: Patient's Short Term Goal:     Interventions:   -   - See additional Care Plan goals for specific interventions  Outcome: Progressing     Problem: CARDIOVASCULAR - ADULT  Goal: Maintains optimal cardiac output and hemodynamic stability  Description: INTERVENTIONS:  - Monitor vital signs, rhythm, and trends  - Monitor for bleeding, hypotension and signs of decreased cardiac output  - Evaluate effectiveness of vasoactive medications to optimize hemodynamic stability  - Monitor arterial and/or venous puncture sites for bleeding and/or hematoma  - Assess quality of pulses, skin color and temperature  - Assess for signs of decreased coronary artery perfusion - ex.  Angina  - Evaluate fluid balance, assess for edema, trend weights  Outcome: Progressing  Goal: Absence of cardiac arrhythmias or at baseline  Description: INTERVENTIONS:  - Continuous cardiac monitoring, monitor vital signs, obtain 12 lead EKG if indicated  - Evaluate effectiveness of antiarrhythmic and heart rate control medications as ordered  - Initiate emergency measures for life threatening arrhythmias  - Monitor electrolytes and administer replacement therapy as ordered  Outcome: Progressing     Problem: NEUROLOGICAL - ADULT  Goal: Achieves stable or improved neurological status  Description: INTERVENTIONS  - Assess for and report changes in neurological status  - Initiate measures to prevent increased intracranial pressure  - Maintain blood pressure and fluid volume within ordered parameters to optimize cerebral perfusion and minimize risk of hemorrhage  - Monitor temperature, glucose, and sodium.  Initiate appropriate interventions as ordered  Outcome: Progressing

## 2022-10-23 LAB
ANION GAP SERPL CALC-SCNC: 5 MMOL/L (ref 0–18)
BASOPHILS # BLD AUTO: 0.04 X10(3) UL (ref 0–0.2)
BASOPHILS NFR BLD AUTO: 0.7 %
BUN BLD-MCNC: 22 MG/DL (ref 7–18)
CALCIUM BLD-MCNC: 9.3 MG/DL (ref 8.5–10.1)
CHLORIDE SERPL-SCNC: 110 MMOL/L (ref 98–112)
CO2 SERPL-SCNC: 23 MMOL/L (ref 21–32)
CREAT BLD-MCNC: 0.92 MG/DL
EOSINOPHIL # BLD AUTO: 0.17 X10(3) UL (ref 0–0.7)
EOSINOPHIL NFR BLD AUTO: 3.1 %
ERYTHROCYTE [DISTWIDTH] IN BLOOD BY AUTOMATED COUNT: 13.5 %
GFR SERPLBLD BASED ON 1.73 SQ M-ARVRAT: 66 ML/MIN/1.73M2 (ref 60–?)
GLUCOSE BLD-MCNC: 101 MG/DL (ref 70–99)
HCT VFR BLD AUTO: 37 %
HGB BLD-MCNC: 12.4 G/DL
IMM GRANULOCYTES # BLD AUTO: 0.02 X10(3) UL (ref 0–1)
IMM GRANULOCYTES NFR BLD: 0.4 %
LYMPHOCYTES # BLD AUTO: 1.17 X10(3) UL (ref 1–4)
LYMPHOCYTES NFR BLD AUTO: 21.7 %
MCH RBC QN AUTO: 33.7 PG (ref 26–34)
MCHC RBC AUTO-ENTMCNC: 33.5 G/DL (ref 31–37)
MCV RBC AUTO: 100.5 FL
MONOCYTES # BLD AUTO: 0.73 X10(3) UL (ref 0.1–1)
MONOCYTES NFR BLD AUTO: 13.5 %
NEUTROPHILS # BLD AUTO: 3.27 X10 (3) UL (ref 1.5–7.7)
NEUTROPHILS # BLD AUTO: 3.27 X10(3) UL (ref 1.5–7.7)
NEUTROPHILS NFR BLD AUTO: 60.6 %
OSMOLALITY SERPL CALC.SUM OF ELEC: 289 MOSM/KG (ref 275–295)
PLATELET # BLD AUTO: 151 10(3)UL (ref 150–450)
POTASSIUM SERPL-SCNC: 3.8 MMOL/L (ref 3.5–5.1)
RBC # BLD AUTO: 3.68 X10(6)UL
SODIUM SERPL-SCNC: 138 MMOL/L (ref 136–145)
WBC # BLD AUTO: 5.4 X10(3) UL (ref 4–11)

## 2022-10-23 PROCEDURE — 99233 SBSQ HOSP IP/OBS HIGH 50: CPT | Performed by: OTHER

## 2022-10-23 RX ORDER — POTASSIUM CHLORIDE 20 MEQ/1
40 TABLET, EXTENDED RELEASE ORAL ONCE
Status: COMPLETED | OUTPATIENT
Start: 2022-10-23 | End: 2022-10-23

## 2022-10-23 NOTE — PLAN OF CARE
RN SHIFT NOTE    Assumed care of pt at 0700. Pt denies pain at this time. Patient is alert and oriented x 3 and forgetful (Reminders and reorientation completed). Patient is NSR on tele, S1 and S2 noted. And pt denies cardiac symptoms. No edema noted. Lung sounds clear. On RA. Last BM on 10/23. Skin is intact. 0.9 NS running at 83ml/hr. Cardiology to see pt today for Intermittent SVT. Pt is up in chair with chair alarm on. Call light and phone within reach. Care needs have been met at this time. POC: Cardiology re-consult, IVF, IV Ancef q 8 for UTI, Monitor heart rhythm      Problem: Patient/Family Goals  Goal: Patient/Family Long Term Goal  Description: Patient's Long Term Goal: return home    Interventions:  - cardiac consult   - neuro consult  - IV fluids  - Orthostatic BPs  - See additional Care Plan goals for specific interventions  Outcome: Progressing  Goal: Patient/Family Short Term Goal  Description: Patient's Short Term Goal: improve dizziness    Interventions:   - orthostatic Bps  - IV fluids  - See additional Care Plan goals for specific interventions  Outcome: Progressing     Problem: CARDIOVASCULAR - ADULT  Goal: Maintains optimal cardiac output and hemodynamic stability  Description: INTERVENTIONS:  - Monitor vital signs, rhythm, and trends  - Monitor for bleeding, hypotension and signs of decreased cardiac output  - Evaluate effectiveness of vasoactive medications to optimize hemodynamic stability  - Monitor arterial and/or venous puncture sites for bleeding and/or hematoma  - Assess quality of pulses, skin color and temperature  - Assess for signs of decreased coronary artery perfusion - ex.  Angina  - Evaluate fluid balance, assess for edema, trend weights  Outcome: Progressing  Goal: Absence of cardiac arrhythmias or at baseline  Description: INTERVENTIONS:  - Continuous cardiac monitoring, monitor vital signs, obtain 12 lead EKG if indicated  - Evaluate effectiveness of antiarrhythmic and heart rate control medications as ordered  - Initiate emergency measures for life threatening arrhythmias  - Monitor electrolytes and administer replacement therapy as ordered  Outcome: Progressing     Problem: NEUROLOGICAL - ADULT  Goal: Achieves stable or improved neurological status  Description: INTERVENTIONS  - Assess for and report changes in neurological status  - Initiate measures to prevent increased intracranial pressure  - Maintain blood pressure and fluid volume within ordered parameters to optimize cerebral perfusion and minimize risk of hemorrhage  - Monitor temperature, glucose, and sodium.  Initiate appropriate interventions as ordered  Outcome: Progressing

## 2022-10-23 NOTE — PLAN OF CARE
Assumed care of patient at 1. Patient alert and oriented x3, disoriented to time. On room air. NSR on telemetry. Urostomy in place draining clear yellow urine. IV fluids infusing per orders. Patient denies any dizziness or pain. Call light within reach, bed alarm in place. Patient updated on plan of care. Problem: Patient/Family Goals  Goal: Patient/Family Long Term Goal  Description: Patient's Long Term Goal: return home    Interventions:  - cardiac consult   - neuro consult  - IV fluids  - Orthostatic BPs  - See additional Care Plan goals for specific interventions  Outcome: Progressing  Goal: Patient/Family Short Term Goal  Description: Patient's Short Term Goal: improve dizziness    Interventions:   - orthostatic Bps  - IV fluids  - See additional Care Plan goals for specific interventions  Outcome: Progressing     Problem: CARDIOVASCULAR - ADULT  Goal: Maintains optimal cardiac output and hemodynamic stability  Description: INTERVENTIONS:  - Monitor vital signs, rhythm, and trends  - Monitor for bleeding, hypotension and signs of decreased cardiac output  - Evaluate effectiveness of vasoactive medications to optimize hemodynamic stability  - Monitor arterial and/or venous puncture sites for bleeding and/or hematoma  - Assess quality of pulses, skin color and temperature  - Assess for signs of decreased coronary artery perfusion - ex.  Angina  - Evaluate fluid balance, assess for edema, trend weights  Outcome: Progressing  Goal: Absence of cardiac arrhythmias or at baseline  Description: INTERVENTIONS:  - Continuous cardiac monitoring, monitor vital signs, obtain 12 lead EKG if indicated  - Evaluate effectiveness of antiarrhythmic and heart rate control medications as ordered  - Initiate emergency measures for life threatening arrhythmias  - Monitor electrolytes and administer replacement therapy as ordered  Outcome: Progressing     Problem: NEUROLOGICAL - ADULT  Goal: Achieves stable or improved neurological status  Description: INTERVENTIONS  - Assess for and report changes in neurological status  - Initiate measures to prevent increased intracranial pressure  - Maintain blood pressure and fluid volume within ordered parameters to optimize cerebral perfusion and minimize risk of hemorrhage  - Monitor temperature, glucose, and sodium.  Initiate appropriate interventions as ordered  Outcome: Progressing

## 2022-10-24 LAB
ALBUMIN SERPL-MCNC: 2.8 G/DL (ref 3.4–5)
ALP LIVER SERPL-CCNC: 52 U/L
ALT SERPL-CCNC: 21 U/L
AMMONIA PLAS-MCNC: 26 UMOL/L (ref 11–32)
ANION GAP SERPL CALC-SCNC: 6 MMOL/L (ref 0–18)
ARTERIAL PATENCY WRIST A: POSITIVE
AST SERPL-CCNC: 17 U/L (ref 15–37)
BASE EXCESS BLDA CALC-SCNC: 0.1 MMOL/L (ref ?–2)
BASOPHILS # BLD AUTO: 0.05 X10(3) UL (ref 0–0.2)
BASOPHILS NFR BLD AUTO: 0.9 %
BILIRUB DIRECT SERPL-MCNC: <0.1 MG/DL (ref 0–0.2)
BILIRUB SERPL-MCNC: 0.2 MG/DL (ref 0.1–2)
BODY TEMPERATURE: 98.6 F
BUN BLD-MCNC: 20 MG/DL (ref 7–18)
CALCIUM BLD-MCNC: 9.2 MG/DL (ref 8.5–10.1)
CHLORIDE SERPL-SCNC: 112 MMOL/L (ref 98–112)
CO2 SERPL-SCNC: 23 MMOL/L (ref 21–32)
COHGB MFR BLD: 0.6 % SAT (ref 0–3)
CREAT BLD-MCNC: 0.86 MG/DL
EOSINOPHIL # BLD AUTO: 0.17 X10(3) UL (ref 0–0.7)
EOSINOPHIL NFR BLD AUTO: 3.1 %
ERYTHROCYTE [DISTWIDTH] IN BLOOD BY AUTOMATED COUNT: 13.3 %
FIO2: 21 %
GFR SERPLBLD BASED ON 1.73 SQ M-ARVRAT: 71 ML/MIN/1.73M2 (ref 60–?)
GLUCOSE BLD-MCNC: 105 MG/DL (ref 70–99)
GLUCOSE BLD-MCNC: 98 MG/DL (ref 70–99)
HCO3 BLDA-SCNC: 25 MEQ/L (ref 21–27)
HCT VFR BLD AUTO: 36.3 %
HGB BLD-MCNC: 12.3 G/DL
HGB BLD-MCNC: 13.3 G/DL
IMM GRANULOCYTES # BLD AUTO: 0.03 X10(3) UL (ref 0–1)
IMM GRANULOCYTES NFR BLD: 0.5 %
LACTATE SERPL-SCNC: 0.6 MMOL/L (ref 0.4–2)
LYMPHOCYTES # BLD AUTO: 1.36 X10(3) UL (ref 1–4)
LYMPHOCYTES NFR BLD AUTO: 24.9 %
MCH RBC QN AUTO: 33.7 PG (ref 26–34)
MCHC RBC AUTO-ENTMCNC: 33.9 G/DL (ref 31–37)
MCV RBC AUTO: 99.5 FL
METHGB MFR BLD: 0.3 % SAT (ref 0.4–1.5)
MONOCYTES # BLD AUTO: 0.71 X10(3) UL (ref 0.1–1)
MONOCYTES NFR BLD AUTO: 13 %
NEUTROPHILS # BLD AUTO: 3.14 X10 (3) UL (ref 1.5–7.7)
NEUTROPHILS # BLD AUTO: 3.14 X10(3) UL (ref 1.5–7.7)
NEUTROPHILS NFR BLD AUTO: 57.6 %
OSMOLALITY SERPL CALC.SUM OF ELEC: 295 MOSM/KG (ref 275–295)
OXYHGB MFR BLDA: 96.8 % (ref 92–100)
PCO2 BLDA: 34 MM HG (ref 35–45)
PH BLDA: 7.45 [PH] (ref 7.35–7.45)
PLATELET # BLD AUTO: 159 10(3)UL (ref 150–450)
PO2 BLDA: 92 MM HG (ref 80–100)
POTASSIUM SERPL-SCNC: 3.9 MMOL/L (ref 3.5–5.1)
POTASSIUM SERPL-SCNC: 3.9 MMOL/L (ref 3.5–5.1)
PROCALCITONIN SERPL-MCNC: 0.08 NG/ML (ref ?–0.16)
PROT SERPL-MCNC: 6.2 G/DL (ref 6.4–8.2)
RBC # BLD AUTO: 3.65 X10(6)UL
SODIUM SERPL-SCNC: 141 MMOL/L (ref 136–145)
T4 FREE SERPL-MCNC: 1.3 NG/DL (ref 0.8–1.7)
TSI SER-ACNC: 4.52 MIU/ML (ref 0.36–3.74)
VIT B12 SERPL-MCNC: 439 PG/ML (ref 193–986)
WBC # BLD AUTO: 5.5 X10(3) UL (ref 4–11)

## 2022-10-24 PROCEDURE — 90792 PSYCH DIAG EVAL W/MED SRVCS: CPT | Performed by: OTHER

## 2022-10-24 RX ORDER — SODIUM CHLORIDE 9 MG/ML
INJECTION, SOLUTION INTRAVENOUS CONTINUOUS
Status: DISCONTINUED | OUTPATIENT
Start: 2022-10-24 | End: 2022-10-25

## 2022-10-24 RX ORDER — MIRTAZAPINE 7.5 MG/1
7.5 TABLET, FILM COATED ORAL NIGHTLY
Status: DISCONTINUED | OUTPATIENT
Start: 2022-10-24 | End: 2022-10-25

## 2022-10-24 NOTE — PLAN OF CARE
RN SHIFT NOTE    A      Assumed care of pt at 0700. Pt denies pain at this time. Patient is alert and oriented x . .. (Reminders and reorientation completed). Patient is . .. on tele, S1 and S2 present (ex. Murmur?) and pt denies cardiac symptoms. Lung sounds . .. (crackles, diminished, clear, etc.). Abdomen . .. and ... (soft and rounded, firm and rounded, tenderness, etc.). Last BM on ... (date). ...(lower extremity edema, no edema noted in BLE). Skin assessment: . .. Tolerating medications and care neds have been met. POC: . .. (ex. Discharge, labs, tests, procedures, nursing management ex. Fluid restriction, daily weights, etc.)     (Care plan at the bottom)              Problem: Patient/Family Goals  Goal: Patient/Family Long Term Goal  Description: Patient's Long Term Goal: return home    Interventions:  - cardiac consult   - neuro consult  - IV fluids  - Orthostatic BPs  - See additional Care Plan goals for specific interventions  10/24/2022 1815 by Brian Diallo RN  Outcome: Progressing  10/24/2022 1814 by Brian Diallo RN  Outcome: Progressing  Goal: Patient/Family Short Term Goal  Description: Patient's Short Term Goal: improve dizziness    Interventions:   - orthostatic Bps  - IV fluids  - See additional Care Plan goals for specific interventions  10/24/2022 1815 by Brian Diallo RN  Outcome: Progressing  10/24/2022 1814 by Brian Diallo RN  Outcome: Progressing     Problem: CARDIOVASCULAR - ADULT  Goal: Maintains optimal cardiac output and hemodynamic stability  Description: INTERVENTIONS:  - Monitor vital signs, rhythm, and trends  - Monitor for bleeding, hypotension and signs of decreased cardiac output  - Evaluate effectiveness of vasoactive medications to optimize hemodynamic stability  - Monitor arterial and/or venous puncture sites for bleeding and/or hematoma  - Assess quality of pulses, skin color and temperature  - Assess for signs of decreased coronary artery perfusion - ex. Angina  - Evaluate fluid balance, assess for edema, trend weights  10/24/2022 1815 by Scott Pavon RN  Outcome: Progressing  10/24/2022 1814 by Scott Pavon RN  Outcome: Progressing  Goal: Absence of cardiac arrhythmias or at baseline  Description: INTERVENTIONS:  - Continuous cardiac monitoring, monitor vital signs, obtain 12 lead EKG if indicated  - Evaluate effectiveness of antiarrhythmic and heart rate control medications as ordered  - Initiate emergency measures for life threatening arrhythmias  - Monitor electrolytes and administer replacement therapy as ordered  10/24/2022 1815 by Scott Pavon RN  Outcome: Progressing  10/24/2022 1814 by Scott Pavon RN  Outcome: Progressing     Problem: NEUROLOGICAL - ADULT  Goal: Achieves stable or improved neurological status  Description: INTERVENTIONS  - Assess for and report changes in neurological status  - Initiate measures to prevent increased intracranial pressure  - Maintain blood pressure and fluid volume within ordered parameters to optimize cerebral perfusion and minimize risk of hemorrhage  - Monitor temperature, glucose, and sodium.  Initiate appropriate interventions as ordered  10/24/2022 1815 by Scott Pavon RN  Outcome: Progressing  10/24/2022 1814 by Scott Pavon RN  Outcome: Progressing

## 2022-10-24 NOTE — BH PROGRESS NOTE
Dr. Yoshi Choi recommended this pt to follow up with Dr. Yassine Phillips psych clinic to manage her anxiety/depression and suspected dementia. ANATOLIY provided resource under dc instructions.

## 2022-10-24 NOTE — PROGRESS NOTES
PSYCH CONSULT    Date of Admission: 10/20/22  Date of Consult: 10/24/22   Reason for Consultation: Altered mental status    Impression:  Primary Psychiatric Diagnosis:  Acute delirium/encepalopathy due to UTI +/- poor sleep quality. Improving. Cognitive disorder unspecified. Suspect at least mild dementia given her hx of short term memory loss and word finding difficulties. Major depressive disorder, recurrent, moderate. Anxiety disorder unspecified. Rule out LASHAUN. Personality Traits:  Deferred     Pertinent Medical Diagnoses:  UTI. SVT. HTN. HLD. Hx bladder cancer, has urostomy. EEG neg seizures. No stroke on Brain MRI. Recommendations:  1) Agree with staying OFF Klonipin given delirium. Once her delirium resolves, recommend restarting a lower dose of 0.25mg twice a day PRN anxiety OR not using at all. 2) Continue Prozac 20mg daily for anxiety/depression. 3) Start Seroquel 12.5mg nightly PRN insomnia or mood irritability or psychosis. 4) Will ask psych liaison to place Dr. Layla Ybarra psych clinic info in the DC instructions. Dr. Fawad Franz can manage her anxiety/depression and suspected dementia. Or she should follow up with neurology regarding her dementia and continue with her outpatient psychiatrist. I do not think she will agree to full outpatient neuropsych testing. She refused to spell WORLD backwards for me. 5) Will obtain collateral from family. Full note to follow.     Dr. Maribell Zamorano

## 2022-10-24 NOTE — PLAN OF CARE
A&Ox3. Able to state name, birthday, and where she is. Unable to month, year, or situation. Pt asking repetitive questions, and frequently asks about \"people yelling\" in the shea. O2 sat WNL on RA  SR on tele  Continent of bowel, urostomy. Afebrile overnight  Up with [facility]     Plan: IV antibiotics, psych to see     IV antibiotics given as ordered. Problem: Patient/Family Goals  Goal: Patient/Family Long Term Goal  Description: Patient's Long Term Goal: return home    Interventions:  - cardiac consult   - neuro consult  - IV fluids  - Orthostatic BPs  - See additional Care Plan goals for specific interventions  Outcome: Progressing  Goal: Patient/Family Short Term Goal  Description: Patient's Short Term Goal: improve dizziness    Interventions:   - orthostatic Bps  - IV fluids  - See additional Care Plan goals for specific interventions  Outcome: Progressing     Problem: CARDIOVASCULAR - ADULT  Goal: Maintains optimal cardiac output and hemodynamic stability  Description: INTERVENTIONS:  - Monitor vital signs, rhythm, and trends  - Monitor for bleeding, hypotension and signs of decreased cardiac output  - Evaluate effectiveness of vasoactive medications to optimize hemodynamic stability  - Monitor arterial and/or venous puncture sites for bleeding and/or hematoma  - Assess quality of pulses, skin color and temperature  - Assess for signs of decreased coronary artery perfusion - ex.  Angina  - Evaluate fluid balance, assess for edema, trend weights  Outcome: Progressing  Goal: Absence of cardiac arrhythmias or at baseline  Description: INTERVENTIONS:  - Continuous cardiac monitoring, monitor vital signs, obtain 12 lead EKG if indicated  - Evaluate effectiveness of antiarrhythmic and heart rate control medications as ordered  - Initiate emergency measures for life threatening arrhythmias  - Monitor electrolytes and administer replacement therapy as ordered  Outcome: Progressing     Problem: NEUROLOGICAL - ADULT  Goal: Achieves stable or improved neurological status  Description: INTERVENTIONS  - Assess for and report changes in neurological status  - Initiate measures to prevent increased intracranial pressure  - Maintain blood pressure and fluid volume within ordered parameters to optimize cerebral perfusion and minimize risk of hemorrhage  - Monitor temperature, glucose, and sodium.  Initiate appropriate interventions as ordered  Outcome: Progressing

## 2022-10-24 NOTE — PROGRESS NOTES
10/23/22 1652 10/23/22 1654 10/23/22 1656   Vital Signs   Respiratory Quality  --  Normal  --    /60 126/68 117/70   MAP (mmHg) 78 83 82   BP Location  --  Right arm  --    BP Method  --  Automatic  --    Patient Position Lying Sitting Standing   orthostatic BP no

## 2022-10-24 NOTE — CDS QUERY
Clarification - Potential Diagnosis Snehal Calderon  Dear Dr. Laquita Montes,  Clinical information in the patient's medical record (provided below) includes documentation of diagnoses with potential association. For accurate ICD-10-CM code assignment,, please clarify the following:  IS UTI ASSOCIATED WITH OR DUE TO  UROSTOMY? [ x ] Yes, there is an association and/or causal relationship between the UTI & Urostomy. [  ] No, there is no association and/or causal relationship between the UTI & Urostomy. [  ] Other (please specify):   [  ] Clinically Unable to Determine (please comment):      Documentation from the Medical Record:   Risk: hx UTI, Urostomy  Clinical indicators: UA with pyuria, UCX >100,000cfu/ml klebsiella pneumoniae. Fever -99.9 10/21, tachycardia . WBC 14.8.   10/22 Hosp PN- Klebsiella UTI -- abx changed to cefazolin  Treatment: IV abx        For questions regarding this query, please contact Clinical Documentation :  Susana Dyer RN, BSN EXT. [id number] [facility],[address]. [email or web address]                          Thank you.                                                            THIS FORM IS A PERMANENT PART OF THE MEDICAL RECORD

## 2022-10-25 VITALS
OXYGEN SATURATION: 97 % | WEIGHT: 123.44 LBS | HEART RATE: 69 BPM | TEMPERATURE: 98 F | DIASTOLIC BLOOD PRESSURE: 60 MMHG | RESPIRATION RATE: 16 BRPM | BODY MASS INDEX: 21 KG/M2 | SYSTOLIC BLOOD PRESSURE: 131 MMHG

## 2022-10-25 PROBLEM — F32.2 MAJOR DEPRESSIVE DISORDER, SINGLE EPISODE, SEVERE (HCC): Status: ACTIVE | Noted: 2022-10-25

## 2022-10-25 LAB
ANION GAP SERPL CALC-SCNC: 6 MMOL/L (ref 0–18)
BASOPHILS # BLD AUTO: 0.05 X10(3) UL (ref 0–0.2)
BASOPHILS NFR BLD AUTO: 0.9 %
BUN BLD-MCNC: 19 MG/DL (ref 7–18)
CALCIUM BLD-MCNC: 9.7 MG/DL (ref 8.5–10.1)
CHLORIDE SERPL-SCNC: 109 MMOL/L (ref 98–112)
CO2 SERPL-SCNC: 24 MMOL/L (ref 21–32)
CREAT BLD-MCNC: 0.94 MG/DL
EOSINOPHIL # BLD AUTO: 0.18 X10(3) UL (ref 0–0.7)
EOSINOPHIL NFR BLD AUTO: 3.1 %
ERYTHROCYTE [DISTWIDTH] IN BLOOD BY AUTOMATED COUNT: 13.5 %
GFR SERPLBLD BASED ON 1.73 SQ M-ARVRAT: 64 ML/MIN/1.73M2 (ref 60–?)
GLUCOSE BLD-MCNC: 93 MG/DL (ref 70–99)
HCT VFR BLD AUTO: 38.9 %
HGB BLD-MCNC: 12.9 G/DL
IMM GRANULOCYTES # BLD AUTO: 0.03 X10(3) UL (ref 0–1)
IMM GRANULOCYTES NFR BLD: 0.5 %
LYMPHOCYTES # BLD AUTO: 1.56 X10(3) UL (ref 1–4)
LYMPHOCYTES NFR BLD AUTO: 27 %
MCH RBC QN AUTO: 33.9 PG (ref 26–34)
MCHC RBC AUTO-ENTMCNC: 33.2 G/DL (ref 31–37)
MCV RBC AUTO: 102.1 FL
MONOCYTES # BLD AUTO: 0.69 X10(3) UL (ref 0.1–1)
MONOCYTES NFR BLD AUTO: 12 %
NEUTROPHILS # BLD AUTO: 3.26 X10 (3) UL (ref 1.5–7.7)
NEUTROPHILS # BLD AUTO: 3.26 X10(3) UL (ref 1.5–7.7)
NEUTROPHILS NFR BLD AUTO: 56.5 %
OSMOLALITY SERPL CALC.SUM OF ELEC: 290 MOSM/KG (ref 275–295)
PLATELET # BLD AUTO: 181 10(3)UL (ref 150–450)
POTASSIUM SERPL-SCNC: 3.8 MMOL/L (ref 3.5–5.1)
RBC # BLD AUTO: 3.81 X10(6)UL
SARS-COV-2 RNA RESP QL NAA+PROBE: NOT DETECTED
SODIUM SERPL-SCNC: 139 MMOL/L (ref 136–145)
WBC # BLD AUTO: 5.8 X10(3) UL (ref 4–11)

## 2022-10-25 PROCEDURE — 99232 SBSQ HOSP IP/OBS MODERATE 35: CPT | Performed by: OTHER

## 2022-10-25 RX ORDER — CEPHALEXIN 500 MG/1
500 CAPSULE ORAL 3 TIMES DAILY
Qty: 7 CAPSULE | Refills: 0 | Status: SHIPPED | OUTPATIENT
Start: 2022-10-25 | End: 2022-11-04

## 2022-10-25 RX ORDER — MIRTAZAPINE 15 MG/1
15 TABLET, FILM COATED ORAL NIGHTLY
Status: DISCONTINUED | OUTPATIENT
Start: 2022-10-25 | End: 2022-10-25

## 2022-10-25 RX ORDER — MIRTAZAPINE 15 MG/1
15 TABLET, FILM COATED ORAL NIGHTLY
Qty: 30 TABLET | Refills: 0 | Status: ON HOLD | OUTPATIENT
Start: 2022-10-25 | End: 2022-11-04

## 2022-10-25 NOTE — CM/SW NOTE
Informed by RN that patient will be transferring to [facility]. Spoke with [facility] and placed ambulance on will call. PCS form completed. SW will remain available.      [facility]  Aryan Pickard [address] [place] [address]  Discharge Planner  [phone]

## 2022-10-25 NOTE — DISCHARGE INSTRUCTIONS
Psychiatry: Per Dr. Urbina Rather recommendation, please follow up with Dr. Irene Salazar psych clinic to manage anxiety/depression. Dr. Boucher [facility], [place] [address]. [address] [phone]    Repeat TFTs in 6 weeks with PCP

## 2022-10-25 NOTE — PLAN OF CARE
Received patient at 0730 . Alert and oriented to place and self, very lethargic. Tele rhythm is normal sinus . O2 saturation is within normal range on room air . Breath sounds are regular and clear bilaterally. Patient is ambulating with one assist from the bed to the chair . Patient is voiding. Last BM is 10/23. Patient has no complaints of pain. Call light is within reach of patient. All patient needs currently met. 1200- Patient is awake and more alert, transferred from bed to chair    1400- Plan to transfer patient to [facility]      Problem: Patient/Family Goals  Goal: Patient/Family Long Term Goal  Description: Patient's Long Term Goal: return home    Interventions:  - cardiac consult   - neuro consult  - IV fluids  - Orthostatic BPs  - See additional Care Plan goals for specific interventions  Outcome: Progressing  Goal: Patient/Family Short Term Goal  Description: Patient's Short Term Goal: improve dizziness    Interventions:   - orthostatic Bps  - IV fluids  - See additional Care Plan goals for specific interventions  Outcome: Progressing     Problem: CARDIOVASCULAR - ADULT  Goal: Maintains optimal cardiac output and hemodynamic stability  Description: INTERVENTIONS:  - Monitor vital signs, rhythm, and trends  - Monitor for bleeding, hypotension and signs of decreased cardiac output  - Evaluate effectiveness of vasoactive medications to optimize hemodynamic stability  - Monitor arterial and/or venous puncture sites for bleeding and/or hematoma  - Assess quality of pulses, skin color and temperature  - Assess for signs of decreased coronary artery perfusion - ex.  Angina  - Evaluate fluid balance, assess for edema, trend weights  Outcome: Progressing  Goal: Absence of cardiac arrhythmias or at baseline  Description: INTERVENTIONS:  - Continuous cardiac monitoring, monitor vital signs, obtain 12 lead EKG if indicated  - Evaluate effectiveness of antiarrhythmic and heart rate control medications as ordered  - Initiate emergency measures for life threatening arrhythmias  - Monitor electrolytes and administer replacement therapy as ordered  Outcome: Progressing     Problem: NEUROLOGICAL - ADULT  Goal: Achieves stable or improved neurological status  Description: INTERVENTIONS  - Assess for and report changes in neurological status  - Initiate measures to prevent increased intracranial pressure  - Maintain blood pressure and fluid volume within ordered parameters to optimize cerebral perfusion and minimize risk of hemorrhage  - Monitor temperature, glucose, and sodium.  Initiate appropriate interventions as ordered  Outcome: Progressing

## 2022-10-25 NOTE — CERTIFICATION
Ref: [address] [facility] 5/3-403, 5/3-602, 5/3-607, 5/3-610    5/3-702, 5/3-813, 5/4-306, 5/4-402, 5/4-403    5/4-405, 5/4-501, 5/4-611, 6/9-715   Inpatient Certificate  Re: Dolores Aly    (name)     I personally informed the above-named individual of the purpose of this examination and that he or she did not have to speak to me, and that any statements made might be related in court as to the individual's clinical condition or need for services. Additionally, if this examination was for the purpose of determining that the above-named individual is developmentally disabled and dangerous, I informed the individual of his or her right to speak with a relative, friend or  before the examination, and of his or her right to have an  appointed for him or her if he or she so desired. Electronically signed by Ifeoma Whalen MD    Signature of Examiner     On                October 25th , 2022 , at     1:00  [] a.m.  [x] p.m.,  I personally examined the    (date)  (year)  (time)    above-named individual. The examination was conducted at [facility].  Based on the foregoing examination, it is my opinion that he or she is:  []  A person with mental illness who, because of his or her illness is reasonably expected, unless treated on an inpatient basis, to engage in conduct placing such person or another in physical harm or in reasonable expectation of being physically harmed;   [x]  A person with mental illness who, because of his or her illness is unable to provide for his or her basic physical needs so as to guard himself or herself from serious harm, without the assistance of family or others, unless treated on an inpatient basis;   []  A person with mental illness who: refuses treatment or is not adhering adequately to prescribed treatment; because of the nature of his or her illness is unable to understand his or her need for treatment; and if not treated on an inpatient basis, is reasonably expected based on his or her behavioral history, to suffer mental or emotional deterioration and is reasonably expected, after such deterioration, to meet the criteria of either paragraph one or paragraph two above;   []  An individual who is developmentally disabled and unless treated on an in-patient basis is reasonably expected to inflict serious physical harm upon himself or herself or others in the near future, and/or   [x]  Is in need of immediate hospitalization for the prevention of such harm. I base my opinion on the following (including clinical observations, factual information):  I examined the patient in person. 68 yo WF with severe major depression was admitted on 10/20/22 for treatment of a UTI and management of dizziness. Started on antibiotics and a beta-blocker for short episodes of SVT. Cleared by cardiology. She has low energy and mood and motivation, anhendonia, hopelessness, hypersomnolence, and low appetite. She has lost 15 lbs the past 3 months. Unable to care for herself due to depression.   I believe that the individual is subject to: []  Involuntary inpatient admission and is not in need of immediate hospitalization   (check one) [x]  Involuntary inpatient admission and is in need of immediate hospitalization    []  Judicial inpatient admission and is not in need of immediate hospitalization    []  Judicial inpatient admission and is in need of immediate hospitalization     Date: 10/25/2022 Signature: Electronically signed by Wm Jacobson MD   Title: MD Printed Name: Wm Jacobson [phone] (Q[id number]) Inpatient Certificate    Printed by [employer]

## 2022-10-25 NOTE — PLAN OF CARE
RN SHIFT NOTE    Assumed care of pt at 0700. Pt was has been somnolent all day and hard to arouse. Patient is alert and oriented x 2-3, and forgetful (Reminders and reorientation completed). Patient NSR/SB on tele, S1 and S2 present and pt denies cardiac symptoms. Lung sounds clear. Abdomen soft and round. Last BM on 10/22. Skin is intact. Dr. Iván Lee paged at [phone] due to patient refuses to wake up. Labs were ordered. All Labs came back within normal limits. Dr. Iván Lee was paged again at [phone]. ABG's ordered and IVF 0.9 NS running at 50 ml/hr restarted. ABG's resulted normal. Evening metoprolol held d/t HR in the 50's. Blood cultures pending. Patient declined to eat food or drink water all day today. Orthostatic BP was not done due to patient declining to wake up. Call light within reach and bed alarm on.        POC: Monitor HR, IVF 50cc/hr 0.9 NS    Problem: Patient/Family Goals  Goal: Patient/Family Long Term Goal  Description: Patient's Long Term Goal: return home    Interventions:  - cardiac consult   - neuro consult  - IV fluids  - Orthostatic BPs  - See additional Care Plan goals for specific interventions  10/24/2022 1956 by Jose Garcia RN  Outcome: Progressing  10/24/2022 1815 by Jose Garcia RN  Outcome: Progressing  10/24/2022 1814 by Jose Garcia RN  Outcome: Progressing  Goal: Patient/Family Short Term Goal  Description: Patient's Short Term Goal: improve dizziness    Interventions:   - orthostatic Bps  - IV fluids  - See additional Care Plan goals for specific interventions  10/24/2022 1956 by Jose Garcia RN  Outcome: Progressing  10/24/2022 1815 by Jose Garcia RN  Outcome: Progressing  10/24/2022 1814 by Jose Garcia RN  Outcome: Progressing     Problem: CARDIOVASCULAR - ADULT  Goal: Maintains optimal cardiac output and hemodynamic stability  Description: INTERVENTIONS:  - Monitor vital signs, rhythm, and trends  - Monitor for bleeding, hypotension and signs of decreased cardiac output  - Evaluate effectiveness of vasoactive medications to optimize hemodynamic stability  - Monitor arterial and/or venous puncture sites for bleeding and/or hematoma  - Assess quality of pulses, skin color and temperature  - Assess for signs of decreased coronary artery perfusion - ex. Angina  - Evaluate fluid balance, assess for edema, trend weights  10/24/2022 1956 by Samuel Karimi RN  Outcome: Progressing  10/24/2022 1815 by Samuel Karimi RN  Outcome: Progressing  10/24/2022 1814 by Samuel Karimi RN  Outcome: Progressing  Goal: Absence of cardiac arrhythmias or at baseline  Description: INTERVENTIONS:  - Continuous cardiac monitoring, monitor vital signs, obtain 12 lead EKG if indicated  - Evaluate effectiveness of antiarrhythmic and heart rate control medications as ordered  - Initiate emergency measures for life threatening arrhythmias  - Monitor electrolytes and administer replacement therapy as ordered  10/24/2022 1956 by Samuel Karimi RN  Outcome: Progressing  10/24/2022 1815 by Samuel Karimi RN  Outcome: Progressing  10/24/2022 1814 by Samuel Karimi RN  Outcome: Progressing     Problem: NEUROLOGICAL - ADULT  Goal: Achieves stable or improved neurological status  Description: INTERVENTIONS  - Assess for and report changes in neurological status  - Initiate measures to prevent increased intracranial pressure  - Maintain blood pressure and fluid volume within ordered parameters to optimize cerebral perfusion and minimize risk of hemorrhage  - Monitor temperature, glucose, and sodium.  Initiate appropriate interventions as ordered  10/24/2022 1956 by Samuel Karimi RN  Outcome: Progressing  10/24/2022 1815 by Samuel Karimi RN  Outcome: Progressing  10/24/2022 1814 by Samuel Karimi RN  Outcome: Progressing

## 2022-10-25 NOTE — BH PROGRESS NOTE
Pt signed in, [employer]. Pt's volunteer rights were explained. Copies of P[employer], Volunteer Sign in Form and Rights are scanned into chart and also given the copies to this pt.

## 2022-10-25 NOTE — BH LEVEL OF CARE ASSESSMENT
Crisis Evaluation Assessment    Tran Lara Date of Birth:  [birth year]   Age 67year old MRN [id number]   [facility] 2NE-A Attending Gloria Schulz MD      Patient's legal sex: female  Patient identifies as: female  Patient's birth sex: female  Preferred pronouns: She/Her    Date of Service: 10/25/2022    Referral Source:  Referral Source  Referral Source: Friend/Relative  Referral Source Info: Pt's  brought her to ED. Reason for Crisis Evaluation   Pt reported to have increased depression, forgetfullness, anxiety and lack of appetite since July, 2022. Pt also reported to have trouble doing her day to day activities, impaired functioning to participate in ADLs and lost of weight. Collateral  Per pt's  Jamie Mota;  - Saw change in patient's behavior since July, 2022. Pt refused to drive, participate in ADLs at home and also restrict her interaction with family and friends. - Lack of appetite. Pt lost 15 Ibs in last three to four months. - sleeping for 18-20 hours. - reports to get anxious and depressed, lack of motivation.  - forgetful and lost of interest.   - inability to take care of herself. - impairment in daily functioning. Risk to Self or Others  Pt denies it,  plan and intent. Suicide Risk Assessments:    Source of information for CSSR: Patient  In what setting is the screener performed?: in person  1. Have you wished you were dead or wished you could go to sleep and not wake up? (past 30 days): Yes  2. Have you actually had any thoughts of killing yourself? (past 30 days): No        6. Have you ever done anything, started to do anything, or prepared to do anything to end your life? (lifetime): No     Score -  OV: 1- Low Risk   Describe : Pt denies it. Protective Factors: Pt reports to have a supportive .     Family History or Personal Lived Experience of Loss or Near Loss by Suicide: Yes   Describe loss(es): Pt reported her father committed suicide when pt was 16yo. Non-Suicidal Self-Injury:   Pt denies any self injury, plan and intent. Access to Means:  Access to Means  Has access to means to attempt suicide or harm others or property: No  Access to Firearm/Weapon: No  Do you have a firearm owner ID card?: No    Protective Factors:   Protective Factors: Pt reports to have a supportive . Review of Psychiatric Systems:  Depression: pt reports to have lack of pleasure, low mood and lack of motivation to participate in ADL at home. Pt reports a feeling of sadness and loss of interest.  Anxiety: pt reports to have increased anxiety due to which she is unable to participate in house chores and drive. Panic episodes: pt denies it. A/V Hallucinations: pt denies it. Sleep: pt reports to sleep for 18-20 hours due to depression. Appetite: Pt reports 15 Ibs weight lost in last 3-4 months. No history or eating disorder (estricting, binging, or purging behaviors). Substance Use:  PT/  denies it. Functional Achievement:   Pt reports to have lack of motivation to participate in house chores. Per , pt does not cook, clean and drive. Current Treatment and Treatment History:  Therapist: Pt is seeing a therapist once a week. [phone] Next upcoming appointment is on Oct 31 @ 2:30pm.  Psychiatrist: Marlene Blankenship [phone]. Next appointment is on Nov, 14th. Outpatient programming, PHP/IOP: [facility]. Pt denies it. Relevant Social History:  Pt reports to reside with her . Pt reports to sleep too much and avoid social interaction with friends and family. Legal: Pt denies it.         Anders and Complex (as applicable):  Geriatric Functioning  Dementia Symptoms Observed/Expressed: Yes (Per , he witnessed pt being forgetful.)  Aberrant Motor Activity: None  Verbal Abuse to Others: No  Potentially Dangerous Behaviors: None  Noisy Vocalizations: None  Frequent Distress Calls: No  Responsiveness to Reassurance: Yes  Current Living Situation  Current Living Situation: Private Residence  [employer]  Is the patient verbal?: Yes  Vision or hearing correction?: Eye Glasses  Patient able to understand and follow directions?: Yes  Patient able to express needs?: Yes  Feeding and Swallowing  History of aspiration or choking?: No  Feeding assistance needed?: No  Patient have any chewing or swallowing problems?: No  Mobility/Activity & Assistive Devices  Current/recent injuries or surgeries that affect mobility?: No  Physical Limitations Present: None  Independent in ambulation?: Yes  Transfer Assist: No Assistance Needed  Assistive Device Used[de-identified] None  History of falls?: Yes  When was the most recent fall?: Pt pased out and assissted to groung by  at home. How often has the patient fallen in the last month?: 1  Grooming  Level of independence in dressing: Fully Independent  Level of independence to shower/bathe/wash: Fully Independent  Level of independence in personal grooming tasks (e.g., brushing teeth, brushing hair, applying hearing aids, shaving, etc.): Fully Independent  Toileting  Patient Incontinence: None  Special Considerations  Patient has pressures sores, surgical wounds, bandages/dressings?: No (Pt has Urostomy.)  Patient uses any kind of pump?: No  Does the patient need a BiPAP or CPAP?: No  Intellectual disability reported?   Intellectual Disability?: No (Some forgetfulness.)         EDP Assessment (as applicable):  IBW Calculations  Weight: 123 lb 7.3 oz  SCOFF Questionnaire  Do you make yourself Sick because you feel uncomfortably full?: No  Do you worry that you have lost Control over how much you eat?: No  Have you recently lost more than One stone (14 lb) in a 3-month period?: Yes  Do you believe yourself to be Fat when others say you are too thin?: No  Would you say that Food dominates your life?: No  SCOFF Score: 1         Abuse Assessment:  Abuse Assessment  Physical Abuse: Denies  Verbal Abuse: Denies  Sexual Abuse: Denies  Neglect: Denies  Does anyone say or do something to you that makes you feel unsafe?: No  Have You Ever Been Harmed by a Partner/Caregiver?: No  Health Concerns r/t Abuse: No    Mental Status Exam:   General Appearance  Characteristics: Appropriate clothing  Eye Contact: Direct  Psychomotor Behavior  Gait/Movement: Normal  Abnormal movements: None  Posture: Relaxed  Rate of Movement: Normal  Mood and Affect  Mood or Feelings: Lack of pleasure  Appropriateness of Affect: Appropriate to situation  Range of Affect: Normal  Stability of Affect: Stable  Attitude toward staff: Co-operative  Speech  Rate of Speech: Appropriate  Flow of Speech: Appropriate  Intensity of Volume: Ordinary  Clarity: Clear  Cognition  Concentration: Unimpaired  Orientation Level: Oriented to person;Oriented to place;Oriented to time  Insight: Poor  Judgment: Poor  Thought Patterns  Clarity/Relevance: Logical  Flow: Organized  Content: Ordinary  Level of Consciousness: Alert  Level of Consciousness: Alert  Behavior  Exhibited behavior: Appropriate to situation      Disposition: Dr. Ankita Back recommended inpatient psych treatment. Assessment Summary:   Pt is a 67year-old female with a presenting problem of increased depression, forgetfullness, anxiety and lack of appetite. Pt reported to have low mood, sadness, low motivation, and anxiety. Pt refused to drive, participate in ADLs at home and also restrict her interaction with family and friends since July, 2022. Pt's CSSR score is 1. Pt denied SI including plan, any intent or thoughts to hurt herself. Pt denied HI/aggression/damage to property. Pt reported to see a therapist and psychiatrist currently to manage her depression/anxiety. Pt reported to be compliant with her medications at home. Pt denied substance abuse and hx of inpatient psych treatment previously.  Pt denied food restriction and eating disorder. Per , Sylvia Knott at bedside, pt lost 15 Ibs in last 3-4 months and sleep for atleast 18-20 hours/day. Pt was alert and oriented x3 throughout the assessment and was co-operative. Pt meets requirements for inpatient psychiatric hospitalization due to her inability to take care of self and decreased functional levels. SCOFF Score:1.             Risk/Protective Factors  Protective Factors: Pt reports to have a supportive . Level of Care Recommendations  Consulted with: Dr. Doorn Solis  Level of Care Recommendation: Inpatient Acute Care  Unit: [facility]  Reason for Unit Assigned: Age and Symptoms. Inpatient Criteria: Severely decreased function; Inability to care for self  Education Provided: [facility] Crisis Line Number;Call 911 in an Emergency; Advised to call if condition worsens           Diagnoses:  Primary Psychiatric Diagnosis  Major depressive disorder, recurrent, F33.0               Christie MONTANO

## 2022-10-25 NOTE — PLAN OF CARE
Patient lethargic. Response to name when called. Answers questions, then falls back to sleep. Disoriented to time. Neuro intact. Vitals stable. IVF. Urostomy intact. Kept clean and dry. Fall precaution in place. Problem: CARDIOVASCULAR - ADULT  Goal: Maintains optimal cardiac output and hemodynamic stability  Description: INTERVENTIONS:  - Monitor vital signs, rhythm, and trends  - Monitor for bleeding, hypotension and signs of decreased cardiac output  - Evaluate effectiveness of vasoactive medications to optimize hemodynamic stability  - Monitor arterial and/or venous puncture sites for bleeding and/or hematoma  - Assess quality of pulses, skin color and temperature  - Assess for signs of decreased coronary artery perfusion - ex. Angina  - Evaluate fluid balance, assess for edema, trend weights  Outcome: Progressing  Goal: Absence of cardiac arrhythmias or at baseline  Description: INTERVENTIONS:  - Continuous cardiac monitoring, monitor vital signs, obtain 12 lead EKG if indicated  - Evaluate effectiveness of antiarrhythmic and heart rate control medications as ordered  - Initiate emergency measures for life threatening arrhythmias  - Monitor electrolytes and administer replacement therapy as ordered  Outcome: Progressing     Problem: NEUROLOGICAL - ADULT  Goal: Achieves stable or improved neurological status  Description: INTERVENTIONS  - Assess for and report changes in neurological status  - Initiate measures to prevent increased intracranial pressure  - Maintain blood pressure and fluid volume within ordered parameters to optimize cerebral perfusion and minimize risk of hemorrhage  - Monitor temperature, glucose, and sodium.  Initiate appropriate interventions as ordered  Outcome: Progressing

## 2022-10-26 NOTE — DISCHARGE SUMMARY
General Medicine Discharge Summary     Patient ID:  Nita Collins  67year old  [birth year]    Admit date: 10/20/2022    Discharge date and time: 10/25/2022 10:30 PM     Attending Physician: Nini Vasquez MD    Primary Care Physician: Leodan Potter MD     Reason for admission: syncope, UTI    Discharge Diagnoses: Syncope, near [R55]  Urinary tract infection without hematuria, site unspecified [N39.0]    Discharged Condition: good    Exam: (see progress notes for full details)  No acute distress, alert and oriented    Hospital Course:   Ms. John Cárdenas is a 68 yo female with PMH of HTN, HLD, CKD stage III, hx nephrectomy and urostomy who presented to the ER with recurrent syncope. Patient was seen by cardiology and neurology. EEG testing and MRI brain normal. Noted to have short runs of SVT on tele and was started on metoprolol. Per discussion with patient's  she has been more forgetful and sleepy at home, she was recently started on prozac and clonazepam by psychiatry and some concern that clonazepam was causing side effects, this was stopped. Psychiatry was consulted as patient was showing signs also of delirium and metabolic encephalopathy related to UTI. Decision made with patient and  for inpatient psych treatment, she [facility] Consults: IP CONSULT TO CARDIOLOGY  IP CONSULT TO VASCULAR ACCESS TEAM  IP CONSULT TO NEUROLOGY  IP CONSULT TO SOCIAL WORK    Operative Procedures:      Disposition: home    Patient Instructions:   Discharge Medication List as of 10/25/2022  9:40 PM    START taking these medications    cephalexin 500 MG Oral Cap  Take 1 capsule (500 mg total) by mouth 3 (three) times daily. , Normal, Disp-7 capsule, R-0    mirtazapine 15 MG Oral Tab  Take 1 tablet (15 mg total) by mouth nightly., Normal, Disp-30 tablet, R-0    metoprolol tartrate 25 MG Oral Tab  Take 1 tablet (25 mg total) by mouth 2x Daily(Beta Blocker). , Normal, Disp-180 tablet, R-1      CONTINUE these medications which have NOT CHANGED    FLUoxetine 20 MG Oral Cap  Take 20 mg by mouth every morning., Historical    acidophilus-pectin Oral Cap  Take 1 capsule by mouth daily. , Historical    simvastatin 40 MG Oral Tab  TAKE ONE TABLET (40 MG TOTAL) BY MOUTH NIGHTLY, Normal, Disp-90 tablet, R-3    Multiple Vitamin (MULTI-VITAMIN DAILY OR)  Take 1 tablet by mouth daily. , Historical    Coenzyme Q10 (COQ10) 100 MG Oral Cap  Take 1 capsule by mouth daily. , Historical      STOP taking these medications    clonazePAM 0.5 MG Oral Tab    I reconciled current and discharge medications on day of discharge    Follow-up with   PCP in 1 week  Cardiology as instructed  Neurology  Psychiatry     Orders Placed This Encounter      Consult to PsychIATRY      Total Time Coordinating Care: Greater than 30 minutes    Patient had opportunity to ask questions and state understand and agree with therapeutic plan as outlined above.      Thank Jasmine Lawrence MD

## 2022-10-26 NOTE — PLAN OF CARE
Pt. Sitting at the recliner chair , alert and ox 2-3 , on RA , breathing pattern easy and non labored. Tele shows SR / SB on the monitor. C/o gen pain , medicated with Tylenol as needed. Urostomy site , C/D/O , draining to [facility] RACHELLE bag. Cont. Monitor per tele/labs/v/s. Meds as ordere. Fall/safety precautions instructed , placed call light w/in reach. [phone]    Pt. Transferred to [facility] in fair condition , accompanied by ambulance. Nursing report given to nurse Ran Francisco.     Problem: Patient/Family Goals  Goal: Patient/Family Long Term Goal  Description: Patient's Long Term Goal: return home    Interventions:  - cardiac consult   - neuro consult  - IV fluids  - Orthostatic BPs  - See additional Care Plan goals for specific interventions  Outcome: Progressing  Goal: Patient/Family Short Term Goal  Description: Patient's Short Term Goal: improve dizziness    Interventions:   - orthostatic Bps  - IV fluids  - See additional Care Plan goals for specific interventions  Outcome: Progressing

## 2025-04-10 ENCOUNTER — HOSPITAL ENCOUNTER (INPATIENT)
Facility: HOSPITAL | Age: 75
LOS: 10 days | Discharge: HOME HEALTH | End: 2025-04-20
Attending: EMERGENCY MEDICINE | Admitting: INTERNAL MEDICINE
Payer: MEDICARE

## 2025-04-10 ENCOUNTER — APPOINTMENT (OUTPATIENT)
Dept: GENERAL RADIOLOGY | Facility: HOSPITAL | Age: 75
End: 2025-04-10
Attending: EMERGENCY MEDICINE
Payer: MEDICARE

## 2025-04-10 ENCOUNTER — APPOINTMENT (OUTPATIENT)
Dept: CT IMAGING | Facility: HOSPITAL | Age: 75
End: 2025-04-10
Attending: EMERGENCY MEDICINE
Payer: MEDICARE

## 2025-04-10 ENCOUNTER — APPOINTMENT (OUTPATIENT)
Dept: MRI IMAGING | Facility: HOSPITAL | Age: 75
End: 2025-04-10
Attending: EMERGENCY MEDICINE
Payer: MEDICARE

## 2025-04-10 DIAGNOSIS — G93.89 BRAIN MASS: Primary | ICD-10-CM

## 2025-04-10 LAB
ALBUMIN SERPL-MCNC: 4.5 G/DL (ref 3.2–4.8)
ALBUMIN/GLOB SERPL: 1.5 {RATIO} (ref 1–2)
ALP LIVER SERPL-CCNC: 84 U/L (ref 55–142)
ALT SERPL-CCNC: 17 U/L (ref 10–49)
AMMONIA PLAS-MCNC: <10 UMOL/L (ref 11–32)
ANION GAP SERPL CALC-SCNC: 11 MMOL/L (ref 0–18)
AST SERPL-CCNC: 27 U/L (ref ?–34)
BASOPHILS # BLD AUTO: 0.07 X10(3) UL (ref 0–0.2)
BASOPHILS NFR BLD AUTO: 0.6 %
BILIRUB SERPL-MCNC: 0.3 MG/DL (ref 0.2–1.1)
BILIRUB UR QL STRIP.AUTO: NEGATIVE
BUN BLD-MCNC: 30 MG/DL (ref 9–23)
CALCIUM BLD-MCNC: 10.1 MG/DL (ref 8.7–10.6)
CHLORIDE SERPL-SCNC: 104 MMOL/L (ref 98–112)
CO2 SERPL-SCNC: 24 MMOL/L (ref 21–32)
COLOR UR AUTO: YELLOW
CREAT BLD-MCNC: 1.48 MG/DL (ref 0.55–1.02)
EGFRCR SERPLBLD CKD-EPI 2021: 36 ML/MIN/1.73M2 (ref 60–?)
EOSINOPHIL # BLD AUTO: 0.34 X10(3) UL (ref 0–0.7)
EOSINOPHIL NFR BLD AUTO: 2.9 %
ERYTHROCYTE [DISTWIDTH] IN BLOOD BY AUTOMATED COUNT: 13 %
GLOBULIN PLAS-MCNC: 3 G/DL (ref 2–3.5)
GLUCOSE BLD-MCNC: 108 MG/DL (ref 70–99)
GLUCOSE UR STRIP.AUTO-MCNC: NORMAL MG/DL
HCT VFR BLD AUTO: 39.2 % (ref 35–48)
HGB BLD-MCNC: 13.1 G/DL (ref 12–16)
IMM GRANULOCYTES # BLD AUTO: 0.04 X10(3) UL (ref 0–1)
IMM GRANULOCYTES NFR BLD: 0.3 %
KETONES UR STRIP.AUTO-MCNC: NEGATIVE MG/DL
LACTATE SERPL-SCNC: 2.1 MMOL/L (ref 0.5–2)
LEUKOCYTE ESTERASE UR QL STRIP.AUTO: 500
LYMPHOCYTES # BLD AUTO: 1.72 X10(3) UL (ref 1–4)
LYMPHOCYTES NFR BLD AUTO: 14.6 %
MCH RBC QN AUTO: 33.3 PG (ref 26–34)
MCHC RBC AUTO-ENTMCNC: 33.4 G/DL (ref 31–37)
MCV RBC AUTO: 99.7 FL (ref 80–100)
MONOCYTES # BLD AUTO: 1.21 X10(3) UL (ref 0.1–1)
MONOCYTES NFR BLD AUTO: 10.2 %
NEUTROPHILS # BLD AUTO: 8.44 X10 (3) UL (ref 1.5–7.7)
NEUTROPHILS # BLD AUTO: 8.44 X10(3) UL (ref 1.5–7.7)
NEUTROPHILS NFR BLD AUTO: 71.4 %
OSMOLALITY SERPL CALC.SUM OF ELEC: 295 MOSM/KG (ref 275–295)
PH UR STRIP.AUTO: 6.5 [PH] (ref 5–8)
PLATELET # BLD AUTO: 204 10(3)UL (ref 150–450)
POTASSIUM SERPL-SCNC: 3.8 MMOL/L (ref 3.5–5.1)
PROT SERPL-MCNC: 7.5 G/DL (ref 5.7–8.2)
PROT UR STRIP.AUTO-MCNC: 20 MG/DL
RBC # BLD AUTO: 3.93 X10(6)UL (ref 3.8–5.3)
SODIUM SERPL-SCNC: 139 MMOL/L (ref 136–145)
SP GR UR STRIP.AUTO: 1.02 (ref 1–1.03)
UROBILINOGEN UR STRIP.AUTO-MCNC: NORMAL MG/DL
WBC # BLD AUTO: 11.8 X10(3) UL (ref 4–11)
WBC #/AREA URNS AUTO: >50 /HPF

## 2025-04-10 PROCEDURE — 71045 X-RAY EXAM CHEST 1 VIEW: CPT | Performed by: EMERGENCY MEDICINE

## 2025-04-10 PROCEDURE — 70450 CT HEAD/BRAIN W/O DYE: CPT | Performed by: EMERGENCY MEDICINE

## 2025-04-10 PROCEDURE — 70553 MRI BRAIN STEM W/O & W/DYE: CPT | Performed by: EMERGENCY MEDICINE

## 2025-04-10 RX ORDER — METOPROLOL TARTRATE 25 MG/1
25 TABLET, FILM COATED ORAL 2 TIMES DAILY
Status: DISCONTINUED | OUTPATIENT
Start: 2025-04-10 | End: 2025-04-20

## 2025-04-10 RX ORDER — LEVOTHYROXINE SODIUM 25 UG/1
25 TABLET ORAL
Status: DISCONTINUED | OUTPATIENT
Start: 2025-04-10 | End: 2025-04-10

## 2025-04-10 RX ORDER — MIRTAZAPINE 15 MG/1
15 TABLET, FILM COATED ORAL NIGHTLY
Status: DISCONTINUED | OUTPATIENT
Start: 2025-04-10 | End: 2025-04-10

## 2025-04-10 RX ORDER — BUSPIRONE HYDROCHLORIDE 5 MG/1
5 TABLET ORAL 2 TIMES DAILY
Status: DISCONTINUED | OUTPATIENT
Start: 2025-04-10 | End: 2025-04-20

## 2025-04-10 RX ORDER — BUSPIRONE HYDROCHLORIDE 5 MG/1
5 TABLET ORAL 2 TIMES DAILY
COMMUNITY

## 2025-04-10 RX ORDER — SODIUM CHLORIDE, SODIUM LACTATE, POTASSIUM CHLORIDE, CALCIUM CHLORIDE 600; 310; 30; 20 MG/100ML; MG/100ML; MG/100ML; MG/100ML
INJECTION, SOLUTION INTRAVENOUS CONTINUOUS
Status: DISCONTINUED | OUTPATIENT
Start: 2025-04-10 | End: 2025-04-14

## 2025-04-10 RX ORDER — GADOTERATE MEGLUMINE 376.9 MG/ML
20 INJECTION INTRAVENOUS
Status: COMPLETED | OUTPATIENT
Start: 2025-04-10 | End: 2025-04-10

## 2025-04-10 RX ORDER — HEPARIN SODIUM 5000 [USP'U]/ML
5000 INJECTION, SOLUTION INTRAVENOUS; SUBCUTANEOUS EVERY 12 HOURS SCHEDULED
Status: DISCONTINUED | OUTPATIENT
Start: 2025-04-10 | End: 2025-04-20

## 2025-04-10 RX ORDER — ACETAMINOPHEN 500 MG
500 TABLET ORAL EVERY 4 HOURS PRN
Status: DISCONTINUED | OUTPATIENT
Start: 2025-04-10 | End: 2025-04-16

## 2025-04-10 RX ORDER — POTASSIUM CHLORIDE 14.9 MG/ML
20 INJECTION INTRAVENOUS ONCE
Status: COMPLETED | OUTPATIENT
Start: 2025-04-10 | End: 2025-04-11

## 2025-04-10 RX ORDER — METOPROLOL TARTRATE 25 MG/1
25 TABLET, FILM COATED ORAL 2 TIMES DAILY
COMMUNITY

## 2025-04-10 RX ORDER — BUPROPION HYDROCHLORIDE 150 MG/1
150 TABLET, EXTENDED RELEASE ORAL DAILY
Status: DISCONTINUED | OUTPATIENT
Start: 2025-04-10 | End: 2025-04-17

## 2025-04-10 RX ORDER — DEXAMETHASONE SODIUM PHOSPHATE 10 MG/ML
4 INJECTION, SOLUTION INTRAMUSCULAR; INTRAVENOUS ONCE
Status: COMPLETED | OUTPATIENT
Start: 2025-04-10 | End: 2025-04-10

## 2025-04-10 RX ORDER — ONDANSETRON 2 MG/ML
4 INJECTION INTRAMUSCULAR; INTRAVENOUS EVERY 4 HOURS PRN
Status: ACTIVE | OUTPATIENT
Start: 2025-04-10 | End: 2025-04-11

## 2025-04-11 ENCOUNTER — APPOINTMENT (OUTPATIENT)
Dept: CT IMAGING | Facility: HOSPITAL | Age: 75
End: 2025-04-11
Attending: NURSE PRACTITIONER
Payer: MEDICARE

## 2025-04-11 LAB
ANION GAP SERPL CALC-SCNC: 12 MMOL/L (ref 0–18)
ATRIAL RATE: 75 BPM
BASOPHILS # BLD AUTO: 0.04 X10(3) UL (ref 0–0.2)
BASOPHILS NFR BLD AUTO: 0.4 %
BUN BLD-MCNC: 18 MG/DL (ref 9–23)
CALCIUM BLD-MCNC: 9.5 MG/DL (ref 8.7–10.6)
CHLORIDE SERPL-SCNC: 109 MMOL/L (ref 98–112)
CO2 SERPL-SCNC: 16 MMOL/L (ref 21–32)
CREAT BLD-MCNC: 1.14 MG/DL (ref 0.55–1.02)
EGFRCR SERPLBLD CKD-EPI 2021: 50 ML/MIN/1.73M2 (ref 60–?)
EOSINOPHIL # BLD AUTO: 0.01 X10(3) UL (ref 0–0.7)
EOSINOPHIL NFR BLD AUTO: 0.1 %
ERYTHROCYTE [DISTWIDTH] IN BLOOD BY AUTOMATED COUNT: 13.1 %
GLUCOSE BLD-MCNC: 146 MG/DL (ref 70–99)
HCT VFR BLD AUTO: 36.3 % (ref 35–48)
HGB BLD-MCNC: 12.4 G/DL (ref 12–16)
IMM GRANULOCYTES # BLD AUTO: 0.06 X10(3) UL (ref 0–1)
IMM GRANULOCYTES NFR BLD: 0.6 %
LACTATE SERPL-SCNC: 1.1 MMOL/L (ref 0.5–2)
LYMPHOCYTES # BLD AUTO: 0.85 X10(3) UL (ref 1–4)
LYMPHOCYTES NFR BLD AUTO: 9.2 %
MCH RBC QN AUTO: 33.4 PG (ref 26–34)
MCHC RBC AUTO-ENTMCNC: 34.2 G/DL (ref 31–37)
MCV RBC AUTO: 97.8 FL (ref 80–100)
MONOCYTES # BLD AUTO: 0.3 X10(3) UL (ref 0.1–1)
MONOCYTES NFR BLD AUTO: 3.2 %
NEUTROPHILS # BLD AUTO: 7.98 X10 (3) UL (ref 1.5–7.7)
NEUTROPHILS # BLD AUTO: 7.98 X10(3) UL (ref 1.5–7.7)
NEUTROPHILS NFR BLD AUTO: 86.5 %
OSMOLALITY SERPL CALC.SUM OF ELEC: 289 MOSM/KG (ref 275–295)
P AXIS: 32 DEGREES
P-R INTERVAL: 148 MS
PLATELET # BLD AUTO: 180 10(3)UL (ref 150–450)
POTASSIUM SERPL-SCNC: 5.2 MMOL/L (ref 3.5–5.1)
POTASSIUM SERPL-SCNC: 5.2 MMOL/L (ref 3.5–5.1)
Q-T INTERVAL: 392 MS
QRS DURATION: 80 MS
QTC CALCULATION (BEZET): 437 MS
R AXIS: 23 DEGREES
RBC # BLD AUTO: 3.71 X10(6)UL (ref 3.8–5.3)
SODIUM SERPL-SCNC: 137 MMOL/L (ref 136–145)
T AXIS: 53 DEGREES
VENTRICULAR RATE: 75 BPM
WBC # BLD AUTO: 9.2 X10(3) UL (ref 4–11)

## 2025-04-11 PROCEDURE — 74177 CT ABD & PELVIS W/CONTRAST: CPT | Performed by: NURSE PRACTITIONER

## 2025-04-11 PROCEDURE — 71260 CT THORAX DX C+: CPT | Performed by: NURSE PRACTITIONER

## 2025-04-11 PROCEDURE — 99222 1ST HOSP IP/OBS MODERATE 55: CPT | Performed by: NEUROLOGICAL SURGERY

## 2025-04-11 RX ORDER — DEXAMETHASONE SODIUM PHOSPHATE 4 MG/ML
4 VIAL (ML) INJECTION EVERY 6 HOURS
Status: DISCONTINUED | OUTPATIENT
Start: 2025-04-11 | End: 2025-04-16

## 2025-04-11 RX ORDER — ATORVASTATIN CALCIUM 20 MG/1
20 TABLET, FILM COATED ORAL NIGHTLY
Status: DISCONTINUED | OUTPATIENT
Start: 2025-04-11 | End: 2025-04-20

## 2025-04-11 NOTE — SLP NOTE
ADULT SWALLOWING EVALUATION    ASSESSMENT    ASSESSMENT/OVERALL IMPRESSION:  Patient is a 74 year old female with PMH of bladder cancer presented to ED with complaint of AMS over last 4 weeks. Patient found to have UTI and CT of head revealed left frontal mass. Orders were received for a bedside swallowing evaluation given findings of AMS and frontal mass.   Oral motor mechanism exam revealed functional oral range, rate, and strength of oral musculature, intact dentition, and clear vocal quality. Strong cough on command.   Assessed patient with thin liquids via spoon, cup, and straw, puree, and solids.   Oral phase revealed functional oral acceptance, retrieval and mastication of solids with timely and complete clearing of the oral cavity.   Pharyngeal phase revealed an apparent timely initiation of the pharyngeal phase with functional hyolaryngeal elevation on palpation. No coughing or throat clearing. Patient presents with functional oral phase and apparent functional pharyngeal phase of swallow free of overt clinical s/s of aspiration.   Recommend regular solids and thin liquids.   Recommend speech/language evaluation.     Collaborated with RN who was in agreement.        [place] Assessment of Swallow Function Score: No abnormality detected    RECOMMENDATIONS   Diet Recommendations - Solids: Regular  Diet Recommendations - Liquids: Thin Liquids       Aspiration Precautions: Upright position  Medication Administration Recommendations: No restrictions  Treatment Plan/Recommendations: Communication evaluation    HISTORY   MEDICAL HISTORY  Reason for Referral: Stroke protocol    Problem List  Principal Problem:    Brain mass      Past Medical History  Past Medical History[1]    Prior Living Situation: Home with spouse  Diet Prior to Admission: Regular, Thin liquids       Patient/Family Goals: To eat and drink    SWALLOWING HISTORY  Current Diet Consistency: Regular, Thin liquids  Dysphagia History: No past history  reported  Imaging Results:   CONCLUSION:    1. Enhancing mass in the left frontal lobe with marked adjacent vasogenic edema as described above is concerning for primary CNS neoplasm    SUBJECTIVE       OBJECTIVE   ORAL MOTOR EXAMINATION  Dentition: Natural, Functional  Symmetry: Within Functional Limits  Strength: Within Functional Limits  Tone: Within Functional Limits  Range of Motion: Within Functional Limits  Rate of Motion: Within Functional Limits    Voice Quality: Clear  Respiratory Status: Unlabored  Consistencies Trialed: Thin liquids, Puree, Hard solid  Method of Presentation: Self presentation, Spoon, Cup, Straw, Consecutive swallows  Patient Positioned: Upright, Midline    Oral Phase of Swallow: Within Functional Limits                      Pharyngeal Phase of Swallow: Within Functional Limits           (Please note: Silent aspiration cannot be evaluated clinically. Videofluoroscopic Swallow Study is required to rule-out silent aspiration.)    Esophageal Phase of Swallow: No complaints consistent with possible esophageal involvement  Comments:               GOALS  Goal #1 Patient will participate in a communication/cognition assessment.   Not Addressed     FOLLOW UP  Treatment Plan/Recommendations: Communication evaluation  Duration: 1 week  Follow Up Needed (Documentation Required): Yes  SLP Follow-up Date: 04/12/25    Thank you for your referral.   If you have any questions, please contact JUAN Smith         [1]   Past Medical History:   Cancer (HCC)    CKD (chronic kidney disease), stage III (HCC)    HYPERLIPIDEMIA    Hypertension    OSTEOPENIA    Personal history of bladder cancer    S/P ileoconduit

## 2025-04-11 NOTE — ED INITIAL ASSESSMENT (HPI)
Brought by medic from home with complaints of altered level of consciousness  since 1 month increased confusion  since one week . Patient was seen her doctor blood test shows elevated wbc .  Her doctor advised to go to  ER   to rule out infection . Patient has history of colon cancer  on chemotherapy last chemo last week .  Patient is now oriented x2

## 2025-04-11 NOTE — CONSULTS
[facility]  Neurosurgery Consult    Sujey Medina Patient Status:  Inpatient    3/6/1950 MRN [id number]   Location [facility] 7NE-A Attending Pepito Casas, DO   Hosp Day # 1 PCP Rajni Tiwari MD     REASON FOR CONSULTATION:  Brain Mass    HISTORY OF PRESENT ILLNESS:  Sujey Medina is a(n) 75 year old female with a PMH of bladder cancer who presented to the emergency room yesterday with complaints of altered mental status over the last 4 weeks.  At the time of presentation patient would respond with smiling and nodding but unable to answer questions of the ED provider.  She is positive for UTI.  CT of the head was performed which shows a left frontal mass.  Neurosurgery was consulted.  Advised to obtain MRI of the brain with and without contrast to further evaluate.  Also started Decadron 4 mg every 6 hours.    Currently patient is awake and alert.  She is oriented to person and place.  When asked what brought her to the hospital she stated she just was not feeling well.  No focal neurological deficits noted.  She is currently under the treatment of Dr. Woody for her bladder cancer.    PAST MEDICAL HISTORY:  Past Medical History[1]    PAST SURGICAL HISTORY:  Past Surgical History[2]    FAMILY HISTORY:  family history includes Heart Disorder in her maternal grandmother and mother; Other in her father and sister.    SOCIAL HISTORY:   reports that she has never smoked. She has never used smokeless tobacco. She reports that she does not currently use alcohol after a past usage of about 2.0 standard drinks of alcohol per week. She reports that she does not use drugs.    ALLERGIES:  Allergies[3]    MEDICATIONS:  Prescriptions Prior to Admission[4]  Current Hospital Medications[5]    REVIEW OF SYSTEMS:  A 10-point system was reviewed.  Pertinent positives and negatives are noted in HPI.      PHYSICAL EXAMINATION:  VITAL SIGNS: /66 (BP Location: Right arm)   Pulse 64   Temp 98.2 °F (36.8 °C) (Oral)    Resp 18   Ht 65\"   Wt 146 lb (66.2 kg)   LMP  (LMP Unknown)   SpO2 97%   BMI 24.30 kg/m²   GENERAL:  Patient is a 75 year old female in no acute distress.  HEENT:  Normocephalic, atraumatic.  NEUROLOGICAL:  This patient is alert and orientated x 2.  Speech fluent. Follows commands.   PERRLA +3 brisk,  EOMI.  Face is symmetrical. CN's GI.  Sensation to light touch is intact bilaterally.  No Pronator Drift.  Strengths 5/5 bilaterally. Finger-to-nose coordination is intact.  Gait deferred.        DIAGNOSTIC DATA:   Lab Results   Component Value Date    WBC 9.2 04/11/2025    HGB 12.4 04/11/2025    HCT 36.3 04/11/2025    .0 04/11/2025    CREATSERUM 1.14 04/11/2025    BUN 18 04/11/2025     04/11/2025    K 5.2 04/11/2025    K 5.2 04/11/2025     04/11/2025    CO2 16.0 04/11/2025     04/11/2025    CA 9.5 04/11/2025    ALB 4.5 04/10/2025    ALKPHO 84 04/10/2025    BILT 0.3 04/10/2025    TP 7.5 04/10/2025    AST 27 04/10/2025    ALT 17 04/10/2025       IMAGING:  MRI BRAIN (W+WO) (CPT=70553)  Result Date: 4/10/2025  CONCLUSION:  1. Enhancing mass in the left frontal lobe with marked adjacent vasogenic edema as described above is concerning for primary CNS neoplasm   LOCATION:  [facility]    Dictated by (CST): Eduardo Alejandra MD on 4/10/2025 at 11:14 PM     Finalized by (CST): Eduardo Alejandra MD on 4/10/2025 at 11:17 PM       XR CHEST AP PORTABLE  (CPT=71045)  Result Date: 4/10/2025  CONCLUSION:  New right chest port.  Elevation of the right hemidiaphragm with small right effusion is noted.   LOCATION:  [facility]      Dictated by (CST): Eduardo Alejandra MD on 4/10/2025 at 8:42 PM     Finalized by (CST): Eduardo Alejandra MD on 4/10/2025 at 8:43 PM       CT BRAIN OR HEAD (CPT=70450)  Result Date: 4/10/2025  CONCLUSION:  Significant vasogenic edema in left frontal lobe likely due to an underlying mass lesion which could represent a primary glioma of the brain versus metastatic lesion.  Also within the differential  would be infection.  Further evaluation using MRI brain with gadolinium is recommended.    LOCATION:  [place]   Dictated by (CST): Ivan Christian MD on 4/10/2025 at 8:27 PM     Finalized by (CST): Ivan Christian MD on 4/10/2025 at 8:30 PM          ASSESSMENT:  75-year-old female with past medical history of bladder cancer who presented to the emergency department with altered mental status.  She was found to have a brain mass.    Plan:  Medical management per hospitalist  Appreciate oncology recommendations  CT chest abdomen pelvis to rule out any metastasis lesions  Continue Decadron 4 mg every 6 hours  Will plan to present at tumor board on Tuesday      ADALBERTO Gray  [facility]  4/11/2025, 8:26 AM   Spectre [id number]      A total of 30 minutes of visit time (exclusible of billable procedures) was administered.  > 50 % of time spent counseling/coordinating care     Is this a shared or split note between Advanced Practice Provider and Physician? Yes   Patient seen examined with nurse practitioner  Case discussed  75-year-old female who presented to hospital with some altered mental status  She is doing better today  Patient found to have UTI  Patient also found to have brain mass on CT  MRI shows large left frontal brain mass  Patient on steroids  Recommend heme-onc consult  Recommend CT chest abdomen pelvis  Further plans once workup completed and discussion with heme-onc  All questions were answered  Reviewed films in detail         [1]   Past Medical History:   Cancer (HCC)    CKD (chronic kidney disease), stage III (HCC)    HYPERLIPIDEMIA    Hypertension    OSTEOPENIA    Personal history of bladder cancer    S/P ileoconduit   [2]   Past Surgical History:  Procedure Laterality Date    Benign biopsy right  1988    Colon ca scrn not hi rsk ind N/A 10/28/2015    Procedure: COLONOSCOPY, POSSIBLE BIOPSY, POSSIBLE POLYPECTOMY 09369;  Surgeon: Lai Heard MD;  Location[facility]    Colonoscopy  6/03    diverticulosis, hemorrhoids    Colonoscopy,diagnostic  10/28/15    diverticulosis    Cystoscopy,insert ureteral stent  3/12/10    Performed by BARNEY IZAGUIRRE at [facility]    Cystoscopy,insert ureteral stent  9/10/2010    Performed by BARNEY IZAGUIRRE at [facility]    Cystoscopy,remv calculus,simple  11/19/2010    Performed by BARNEY IZAGUIRRE at [facility]    Cystourethroscopy  3/2/2011    Performed by PARIL NO at [facility]    Cystourethroscopy,biopsy  11/19/2010    Performed by BARNEY IZAGUIRRE at [facility]    Cystourethroscopy,fulgur 2-5cm lesn  3/12/10    Performed by BARNEY IZAGUIRRE at [facility]    Cystourethroscopy,fulgur 2-5cm lesn  9/10/2010    Performed by BARNEY IZAGUIRRE at [facility]    Fluor gid ctr vad plmt rplcmt/rmvl  3/14/2011    Performed by TANESHA ALEXANDRE at [facility]    Insertion, tunneled centrally inserted venous access device, w/subq port; >5 years  3/14/2011    Performed by TANESHA ALEXANDRE at [facility]    Other surgical history  22 yrs ago    right breast bx    Other surgical history  2-23-09    brennen, Dr. Izaguirre    Other surgical history  6-3-10    cysto-poss [id number]-[id number]. Merrick    Other surgical history  9/10/10    cysto, URS, RPG, TURBT, stent exchange-Dr. Izaguirre    Other surgical history  2/8/11    cysto- Dr. Izaguirre    Other surgical history      thoracic surgery    Other surgical history  11/20/13     VATS RUL    Patient documented not to have experienced any of the following events N/A 10/28/2015    Procedure: COLONOSCOPY, POSSIBLE BIOPSY, POSSIBLE POLYPECTOMY 48805;  Surgeon: Lai Heard MD;  Location: [facility]    Patient withough preoperative order for iv antibiotic surgical site infection prophylaxis. N/A 10/28/2015    Procedure: COLONOSCOPY, POSSIBLE BIOPSY, POSSIBLE POLYPECTOMY 63283;   Surgeon: Lai Heard MD;  Location: [facility]    Removal of tunneled cva device, with subq port or pump, central or peripheral insertion  12/17/2013    Procedure: PORT-A-CATH REMOVAL;  Surgeon: Bobby Palmer MD;  Location: [facility]    Skin surgery  06/13/2019    MOHS, BCC - nasal dorsum, Dr. PATRICIA Ghosh    Tubal ligation      X-ray retrograde pyelogram  3/12/10    Performed by BARNEY IZAGUIRRE at [facility]    X-ray retrograde pyelogram  9/10/2010    Performed by BARNEY IZAGUIRRE at [facility]   [3] No Known Allergies  [4]   Medications Prior to Admission   Medication Sig Dispense Refill Last Dose/Taking    busPIRone 5 MG Oral Tab Take 1 tablet (5 mg total) by mouth in the morning and 1 tablet (5 mg total) before bedtime.   4/9/2025    metoprolol tartrate 25 MG Oral Tab Take 1 tablet (25 mg total) by mouth 2 (two) times daily.   4/10/2025    buPROPion  MG Oral Tablet 12 Hr Take 1 tablet (150 mg total) by mouth in the morning. (Patient taking differently: Take 200 mg by mouth in the morning.) 30 tablet 0 4/10/2025    acidophilus-pectin Oral Cap Take 1 capsule by mouth in the morning.   4/10/2025    simvastatin 40 MG Oral Tab TAKE ONE TABLET (40 MG TOTAL) BY MOUTH NIGHTLY 90 tablet 3 4/9/2025    Multiple Vitamin (MULTI-VITAMIN DAILY OR) Take 1 tablet by mouth in the morning.   4/10/2025   [5]   Current Facility-Administered Medications   Medication Dose Route Frequency    dexamethasone (Decadron) 4 MG/ML injection 4 mg  4 mg Intravenous Q6H    lactated ringers infusion   Intravenous Continuous    heparin (Porcine) 5000 UNIT/ML injection 5,000 Units  5,000 Units Subcutaneous 2 times per day    acetaminophen (Tylenol Extra Strength) tab 500 mg  500 mg Oral Q4H PRN    buPROPion SR (WELLBUTRIN SR) 12 hr tab 150 mg  150 mg Oral Daily    busPIRone (Buspar) tab 5 mg  5 mg Oral BID    metoprolol tartrate (Lopressor) tab 25 mg  25 mg Oral BID

## 2025-04-11 NOTE — H&P
[facility] Hospitalist H&P       CC:   Chief Complaint   Patient presents with    Altered Mental Status        PCP: Rajni Tiwari MD    History of Present Illness: Patient is a 75 year old female with PMH of stage IV bladder cancer with lung metastases s/p cystectomy with ileal conduit and wedge resection of TONNY nodules currently on Enfortumab, depression, hypertension, hyperlipidemia, osteopenia, CKD stage IIIb is presenting to the hospital for altered mental status.  Per EMS, patient has noted to have confusion which is worsened over the past 1 week.  Patient was present to PCP office earlier in the day by  who notes that she has been more confused and disoriented over the last several weeks.  Patient did undergo 1 treatment with Enfortumab earlier this month for metastatic bladder cancer.    On arrival, vital signs stable, creatinine 1.48 (similar to baseline), white count 11.8, urinalysis shows 2+ nitrates and 500 leukocyte esterase.  CT head showing significant vasogenic edema in the left frontal lobe likely due to underlying mass lesion.  Chest x-ray shows small right pleural effusion.  Neurosurgery consulted, patient was given Decadron and admitted to the hospital for further evaluation.    PMH  Past Medical History[1]     PSH  Past Surgical History[2]     ALL:  Allergies[3]     Home Medications:  Medications Taking[4]      Soc Hx  Social History     Tobacco Use    Smoking status: Never    Smokeless tobacco: Never   Substance Use Topics    Alcohol use: Not Currently     Alcohol/week: 2.0 standard drinks of alcohol     Types: 2 Standard drinks or equivalent per week        Fam Hx  Family History[5]    Review of Systems  Comprehensive ROS reviewed and negative except for what's stated above.     OBJECTIVE:  /63 (BP Location: Right arm)   Pulse 64   Temp 97.8 °F (36.6 °C) (Oral)   Resp 16   Ht 5' 5\" (1.651 m)   Wt 146 lb (66.2 kg)   LMP  (LMP Unknown)   SpO2 97%   BMI 24.30 kg/m²      Gen: Alert, no acute distress  Heent: Normocephalic, atraumatic, neck supple, EOMI, PERRLA  Pulm: Lungs CTAB, normal respiratory effort  CV:  Regular rate and rhythm, no murmurs/rubs/gallops  Abd: Soft, nontender, nondistended, bowel sounds present  Extremities: No peripheral edema, no clubbing, pulses intact   Skin: No rashes or lesions  Neuro: AOx2, no focal neurologic deficits, CN II-XII grossly intact  Psych: Pleasantly confused    Diagnostic Data:    CBC/Chem  Recent Labs   Lab 04/10/25  2025 04/11/25  0533   WBC 11.8* 9.2   HGB 13.1 12.4   MCV 99.7 97.8   .0 180.0       Recent Labs   Lab 04/10/25  2024 04/11/25  0555    137   K 3.8 5.2*  5.2*    109   CO2 24.0 16.0*   BUN 30* 18   CREATSERUM 1.48* 1.14*   * 146*   CA 10.1 9.5       Recent Labs   Lab 04/10/25  2024   ALT 17   AST 27   ALB 4.5       No results for input(s): \"TROP\" in the last 168 hours.    Additional Diagnostics:   CXR: image personally reviewed and independently interpreted, shows small right pleural effusion.    Radiology: MRI BRAIN (W+WO) (CPT=70553)  Result Date: 4/10/2025  CONCLUSION:  1. Enhancing mass in the left frontal lobe with marked adjacent vasogenic edema as described above is concerning for primary CNS neoplasm   LOCATION:  [facility]    Dictated by (CST): Eduardo Alejandra MD on 4/10/2025 at 11:14 PM     Finalized by (CST): Eduardo Alejandra MD on 4/10/2025 at 11:17 PM       XR CHEST AP PORTABLE  (CPT=71045)  Result Date: 4/10/2025  CONCLUSION:  New right chest port.  Elevation of the right hemidiaphragm with small right effusion is noted.   LOCATION:  [facility]      Dictated by (CST): Eduardo Alejandra MD on 4/10/2025 at 8:42 PM     Finalized by (CST): Eduardo Alejandra MD on 4/10/2025 at 8:43 PM       CT BRAIN OR HEAD (CPT=70450)  Result Date: 4/10/2025  CONCLUSION:  Significant vasogenic edema in left frontal lobe likely due to an underlying mass lesion which could represent a primary glioma of the brain versus  metastatic lesion.  Also within the differential would be infection.  Further evaluation using MRI brain with gadolinium is recommended.    LOCATION:  [facility]   Dictated by (CST): Ivan Christian MD on 4/10/2025 at 8:27 PM     Finalized by (CST): Ivan Christian MD on 4/10/2025 at 8:30 PM           ASSESSMENT / PLAN:     75 year old female with PMH of stage IV bladder cancer with lung metastases s/p cystectomy with ileal conduit and wedge resection of TONNY nodules currently on Enfortumab, depression, hypertension, hyperlipidemia, osteopenia, CKD stage IIIb is presenting to the hospital for altered mental status.    Left frontal lobe mass with vasogenic edema  Encephalopathy 2/2 above  -Imminent threat to neurologic function given patient's altered mental status  -Could be primary CNS tumor versus metastases?  -Discussed with neurosurgery, continue Decadron 4 mg every 6 hours  -CT chest/abdomen/pelvis to rule out any other metastatic lesions  -Oncology consulted  -Continue to monitor mentation  -PT/OT/SLP    Stage IV bladder cancer  Lung metastases  S/p cystectomy with ileal conduit  History of wedge resection of TONNY nodes  -Follows with Dr. Woody outpatient, received one dose enfortumab earlier this month  -Oncology consulted inpatient  Hyperlipidemia  -Continue home statin    Depression  -Continue home bupropion, buspirone    Hypertension  -Continue home beta-blocker    Dispo: Inpatient    Outpatient or previous hospital records reviewed. Questions/concerns were discussed with patient and/or family by bedside.  Discussed with neurosurgery, oncology.    Sky Gruber DO  [facility]  Contact via [employer]      Advanced Care Planning    While discussing goals of care with the patient and their family, patient voluntarily participated in an advanced care planning discussion. The following was discussed: Patient would like to be full code at this time.  She is okay with all resuscitative  measures including CPR, intubation, ACLS.  She notes that her healthcare POA is her  who is at bedside.  Her goals are currently to receive all the information regarding her prognosis and her to make an informed decision moving forward.  Awaiting further input from other specialties.    16 minutes were spent in discussing advanced care planning. This time was exclusive of the documented time for this visit.      Supplementary Documentation:   DVT Mechanical Prophylaxis:   SCDs,    DVT Pharmacologic Prophylaxis   Medication    heparin (Porcine) 5000 UNIT/ML injection 5,000 Units                Code Status: Full Code  [place]: No urinary catheter in place  [place] Duration (in days):   Central line:    LYNNETTE:            [1]   Past Medical History:   Cancer (HCC)    CKD (chronic kidney disease), stage III (HCC)    HYPERLIPIDEMIA    Hypertension    OSTEOPENIA    Personal history of bladder cancer    S/P ileoconduit   [2]   Past Surgical History:  Procedure Laterality Date    Benign biopsy right  1988    Colon ca scrn not hi rsk ind N/A 10/28/2015    Procedure: COLONOSCOPY, POSSIBLE BIOPSY, POSSIBLE POLYPECTOMY 18773;  Surgeon: Lai Heard MD;  Location: [facility]    Colonoscopy  6/03    diverticulosis, hemorrhoids    Colonoscopy,diagnostic  10/28/15    diverticulosis    Cystoscopy,insert ureteral stent  3/12/10    Performed by BARNEY IZAGUIRRE at [facility]    Cystoscopy,insert ureteral stent  9/10/2010    Performed by BARNEY IZAGUIRRE at [facility]    Cystoscopy,remv calculus,simple  11/19/2010    Performed by BARNEY IZAGUIRRE at [facility]    Cystourethroscopy  3/2/2011    Performed by APRIL NO at [facility]    Cystourethroscopy,biopsy  11/19/2010    Performed by BARNEY IZAGUIRRE at [facility]    Cystourethroscopy,fulgur 2-5cm lesn  3/12/10    Performed by BARNEY IZAGUIRRE at [facility]     Cystourethroscopy,fulgur 2-5cm lesn  9/10/2010    Performed by BARNEY IZAGUIRRE at [facility]    Fluor gid ctr vad plmt rplcmt/rmvl  3/14/2011    Performed by TANESHA ALEXANDRE at [facility]    Insertion, tunneled centrally inserted venous access device, w/subq port; >5 years  3/14/2011    Performed by TANESHA ALEXANDRE at [facility]    Other surgical history  22 yrs ago    right breast bx    Other surgical history  2-23-09    cysto, Dr. Izaguirre    Other surgical history  6-3-10    cysto-poss [id number]. Merrick    Other surgical history  9/10/10    cysto, URS, RPG, TURBT, stent exchange-Dr. Izaguirre    Other surgical history  2/8/11    cysto- Dr. Izaguirre    Other surgical history      thoracic surgery    Other surgical history  11/20/13     VATS RUL    Patient documented not to have experienced any of the following events N/A 10/28/2015    Procedure: COLONOSCOPY, POSSIBLE BIOPSY, POSSIBLE POLYPECTOMY 31083;  Surgeon: Lai Heard MD;  Location: [facility]    Patient withough preoperative order for iv antibiotic surgical site infection prophylaxis. N/A 10/28/2015    Procedure: COLONOSCOPY, POSSIBLE BIOPSY, POSSIBLE POLYPECTOMY 60681;  Surgeon: Lai Heard MD;  Location: [facility]    Removal of tunneled cva device, with subq port or pump, central or peripheral insertion  12/17/2013    Procedure: PORT-A-CATH REMOVAL;  Surgeon: Bobby Palmer MD;  Location: [facility]    Skin surgery  06/13/2019    MOHS, FELIPE - nasal dorsum, Dr. PATRICIA Ghosh    Tubal ligation      X-ray retrograde pyelogram  3/12/10    Performed by BARNEY IZAGUIRRE at [facility]    X-ray retrograde pyelogram  9/10/2010    Performed by BARNEY IZAGUIRRE at [facility]   [3] No Known Allergies  [4]   Outpatient Medications Marked as Taking for the 4/10/25 encounter (Hospital Encounter)   Medication Sig Dispense Refill    busPIRone 5 MG  Oral Tab Take 1 tablet (5 mg total) by mouth in the morning and 1 tablet (5 mg total) before bedtime.      metoprolol tartrate 25 MG Oral Tab Take 1 tablet (25 mg total) by mouth 2 (two) times daily.      buPROPion  MG Oral Tablet 12 Hr Take 1 tablet (150 mg total) by mouth in the morning. (Patient taking differently: Take 200 mg by mouth in the morning.) 30 tablet 0    acidophilus-pectin Oral Cap Take 1 capsule by mouth in the morning.      simvastatin 40 MG Oral Tab TAKE ONE TABLET (40 MG TOTAL) BY MOUTH NIGHTLY 90 tablet 3    Multiple Vitamin (MULTI-VITAMIN DAILY OR) Take 1 tablet by mouth in the morning.     [5]   Family History  Problem Relation Age of Onset    Other (Other) Father         suicide    Heart Disorder Mother     Heart Disorder Maternal Grandmother     Other (Other) Sister         mentally disabled    Cancer Neg

## 2025-04-11 NOTE — PROGRESS NOTES
NURSING ADMISSION NOTE      Patient admitted via Cart  Oriented to room.  Safety precautions initiated.  Bed in low position.  Call light in reach.    Pt received as an admission from the ED.   Alert, oriented to person and place.   Speech is clear and pt is able to read fluently.  Having difficultly identifying objects and pictures.  Otherwise neuro exam intact.     Denies any pain.   IVF infusing.   NPO.  PT and spouse updated on plan of care.

## 2025-04-11 NOTE — ED QUICK NOTES
Orders for admission, patient is aware of plan and ready to go upstairs. Any questions, please call ED RN nila at extension [phone]    Patient Covid vaccination status: Fully vaccinated     COVID Test Ordered in ED: None    COVID Suspicion at Admission: N/A    Running Infusions: Medication Infusions[1] None    Mental Status/LOC at time of transport: oriented x2    Other pertinent information:   CIWA score: N/A   NIH score:  N/A             [1]

## 2025-04-11 NOTE — CONSULTS
Oncology Consult      4/11/25    Bladder cancer brain met    HPI:  75 year old with metastatic bladder cancer currently on EV here with new brain met.     Presented with MS    Ct showed left from mass  edema    She is on dex.  NS consulted      Past Medical History[1]  Social Hx on file[2]  Vitals:    04/11/25 1300   BP: 119/60   Pulse: 67   Resp: 16   Temp: 97.9 °F (36.6 °C)     Nad  Rr  Nd  No edema  Cherry alert  No deformity    A/P:  75 year old with   with metastatic bladder cancer currently on EV here with new brain met, cerebral edema  --iv dex. Can likely convert to oral soon. Outpatient rad onc eval  --abnormal ua, cx pending      Will follow    Data: ct cbc ua bmp, neurosurg notes       [1]   Past Medical History:   Cancer (HCC)    CKD (chronic kidney disease), stage III (HCC)    HYPERLIPIDEMIA    Hypertension    OSTEOPENIA    Personal history of bladder cancer    S/P ileoconduit   [2]   Social History  Socioeconomic History    Marital status:     Number of children: 0   Occupational History    Occupation: Semi retired    Tobacco Use    Smoking status: Never    Smokeless tobacco: Never   Vaping Use    Vaping status: Never Used   Substance and Sexual Activity    Alcohol use: Not Currently     Alcohol/week: 2.0 standard drinks of alcohol     Types: 2 Standard drinks or equivalent per week    Drug use: No   Other Topics Concern    Seat Belt Yes

## 2025-04-11 NOTE — ED PROVIDER NOTES
Patient Seen in: [facility] Emergency Department    History     Chief Complaint   Patient presents with    Altered Mental Status     Stated Complaint:     Subjective:   HPI      75-year-old female with past medical history of bladder cancer presents today for evaluation of altered mental status.  Patient has been confused over the last 4 weeks.  Per report from EMS, confusion has worsened over the last week.  Patient was seen by her primary care doctor's office ordered today and noted to have an elevated white blood cell count so she was advised to come to the ER for evaluation.  Patient is presently smiling and nodding to my questions.  She is unable to answer any my questions at this time.    Objective:     Past Medical History:    Cancer (HCC)    CKD (chronic kidney disease), stage III (HCC)    HYPERLIPIDEMIA    Hypertension    OSTEOPENIA    Personal history of bladder cancer    S/P ileoconduit              Past Surgical History:   Procedure Laterality Date    Benign biopsy right  1988    Colon ca scrn not hi rsk ind N/A 10/28/2015    Procedure: COLONOSCOPY, POSSIBLE BIOPSY, POSSIBLE POLYPECTOMY 80357;  Surgeon: Lai Heard MD;  Location: [facility]    Colonoscopy  6/03    diverticulosis, hemorrhoids    Colonoscopy,diagnostic  10/28/15    diverticulosis    Cystoscopy,insert ureteral stent  3/12/10    Performed by BARNEY IZAGUIRRE at [facility]    Cystoscopy,insert ureteral stent  9/10/2010    Performed by BARNEY IZAGUIRRE at [facility]    Cystoscopy,remv calculus,simple  11/19/2010    Performed by BARNEY IZAGUIRRE at [facility]    Cystourethroscopy  3/2/2011    Performed by APRIL NO at [facility]    Cystourethroscopy,biopsy  11/19/2010    Performed by BARNEY IZAGUIRRE at [facility]    Cystourethroscopy,fulgur 2-5cm lesn  3/12/10    Performed by BARNEY IZAGUIRRE at [facility]     Cystourethroscopy,fulgur 2-5cm lesn  9/10/2010    Performed by BARNEY IZAGUIRRE at [facility]    Fluor gid ctr vad plmt rplcmt/rmvl  3/14/2011    Performed by TANESHA ALEXANDRE at [facility]    Insertion, tunneled centrally inserted venous access device, w/subq port; >5 years  3/14/2011    Performed by TANESHA ALEXANDRE at [facility]    Other surgical history  22 yrs ago    right breast bx    Other surgical history  2-23-09    cysto, Dr. Izaguirre    Other surgical history  6-3-10    cysto-poss [id number]-Kv. Merrick    Other surgical history  9/10/10    cysto, URS, RPG, TURBT, stent exchange-Dr. Izaguirre    Other surgical history  2/8/11    cysto- Dr. Izaguirre    Other surgical history      thoracic surgery    Other surgical history  11/20/13     VATS RUL    Patient documented not to have experienced any of the following events N/A 10/28/2015    Procedure: COLONOSCOPY, POSSIBLE BIOPSY, POSSIBLE POLYPECTOMY 98188;  Surgeon: Lai Heard MD;  Location: [facility]    Patient withough preoperative order for iv antibiotic surgical site infection prophylaxis. N/A 10/28/2015    Procedure: COLONOSCOPY, POSSIBLE BIOPSY, POSSIBLE POLYPECTOMY 38472;  Surgeon: Lai Heard MD;  Location: [facility]    Removal of tunneled cva device, with subq port or pump, central or peripheral insertion  12/17/2013    Procedure: PORT-A-CATH REMOVAL;  Surgeon: Bobby Palmer MD;  Location: [facility]    Skin surgery  06/13/2019    MOHS, BCC - nasal dorsum, Dr. PATRICIA Ghosh    Tubal ligation      X-ray retrograde pyelogram  3/12/10    Performed by BARNEY IZAGUIRRE at [facility]    X-ray retrograde pyelogram  9/10/2010    Performed by BARNEY IZAGUIRRE at [facility]                Social History     Socioeconomic History    Marital status:     Number of children: 0   Occupational History    Occupation: Semi retired     Tobacco Use    Smoking status: Never    Smokeless tobacco: Never   Vaping Use    Vaping status: Never Used   Substance and Sexual Activity    Alcohol use: Not Currently     Alcohol/week: 2.0 standard drinks of alcohol     Types: 2 Standard drinks or equivalent per week    Drug use: No   Other Topics Concern    Seat Belt Yes   Social History Narrative    Semi retired . . G0     Social Drivers of Health     Food Insecurity: No Food Insecurity (4/10/2025)    NCSS - Food Insecurity     Worried About Running Out of Food in the Last Year: No     Ran Out of Food in the Last Year: No   Transportation Needs: No Transportation Needs (4/10/2025)    NCSS - Transportation     Lack of Transportation: No   Housing Stability: Not At Risk (4/10/2025)    NCSS - Housing/Utilities     Has Housing: Yes     Worried About Losing Housing: No     Unable to Get Utilities: No                  Physical Exam     ED Triage Vitals   BP 04/10/25 1922 133/66   Pulse 04/10/25 1922 78   Resp 04/10/25 1922 18   Temp 04/10/25 1934 98.6 °F (37 °C)   Temp src 04/10/25 1934 Temporal   SpO2 04/10/25 1922 98 %   O2 Device 04/10/25 1922 None (Room air)       Current Vitals:   Vital Signs  BP: 108/61  Pulse: 70  Resp: 17  Temp: 97.9 °F (36.6 °C)  Temp src: Oral  MAP (mmHg): 68    Oxygen Therapy  SpO2: 98 %  O2 Device: None (Room air)  Pulse Oximetry Type: Continuous        Physical Exam  Vitals and nursing note reviewed.   Constitutional:       Appearance: Normal appearance.   HENT:      Head: Normocephalic.      Nose: Nose normal.      Mouth/Throat:      Mouth: Mucous membranes are moist.   Eyes:      Extraocular Movements: Extraocular movements intact.   Cardiovascular:      Rate and Rhythm: Normal rate and regular rhythm.   Pulmonary:      Effort: Pulmonary effort is normal.      Breath sounds: Normal breath sounds.   Abdominal:      General: Abdomen is flat.      Tenderness: There is no abdominal tenderness. There is no guarding or  rebound.   Musculoskeletal:         General: Normal range of motion.   Skin:     General: Skin is warm.   Neurological:      General: No focal deficit present.      Mental Status: She is alert.      Cranial Nerves: No cranial nerve deficit.      Sensory: No sensory deficit.      Motor: No weakness.      Coordination: Coordination normal.   Psychiatric:         Mood and Affect: Mood normal.         ED Course     Labs Reviewed   CBC WITH DIFFERENTIAL WITH PLATELET - Abnormal; Notable for the following components:       Result Value    WBC 11.8 (*)     Neutrophil Absolute Prelim 8.44 (*)     Neutrophil Absolute 8.44 (*)     Monocyte Absolute 1.21 (*)     All other components within normal limits   COMP METABOLIC PANEL (14) - Abnormal; Notable for the following components:    Glucose 108 (*)     BUN 30 (*)     Creatinine 1.48 (*)     eGFR-Cr 36 (*)     All other components within normal limits   URINALYSIS WITH CULTURE REFLEX - Abnormal; Notable for the following components:    Clarity Urine Turbid (*)     Blood Urine Trace (*)     Protein Urine 20 (*)     Nitrite Urine 2+ (*)     Leukocyte Esterase Urine 500 (*)     WBC Urine >50 (*)     RBC Urine 6-10 (*)     Bacteria Urine 2+ (*)     Squamous Epi. Cells Few (*)     All other components within normal limits   LACTIC ACID, PLASMA - Abnormal; Notable for the following components:    Lactic Acid 2.1 (*)     All other components within normal limits   AMMONIA, PLASMA - Abnormal; Notable for the following components:    Ammonia <10 (*)     All other components within normal limits   LACTIC ACID REFLEX POST POSTIVE   BASIC METABOLIC PANEL (8)   CBC WITH DIFFERENTIAL WITH PLATELET   POTASSIUM   BLOOD CULTURE   BLOOD CULTURE   URINE CULTURE, ROUTINE     EKG    Rate, intervals and axes as noted on EKG Report.  Rate: 75  Rhythm: Sinus Rhythm  Reading: No STEMI                MRI BRAIN (W+WO) (CPT=70553)  Result Date: 4/10/2025  PROCEDURE:  MRI BRAIN (W+WO) (CPT=70553)   COMPARISON:  [facility] MR, MRI BRAIN (CPT=70551), 10/22/2022, 3:42 PM.  INDICATIONS:  frontal lobe mass  TECHNIQUE:  MRI of the brain was performed with multi-planar T1, T2-weighted images with FLAIR sequences and diffusion weighted images without and with infusion.  PATIENT STATED HISTORY:(As transcribed by Technologist)  Frontal lobe mass   CONTRAST USED:  20 mL of Dotarem  FINDINGS:  An enhancing mass in the left frontal lobe is noted measuring up to 5.7 x 3.2 x 3.3 cm (series 10, image 112, series 1001, image 28) compatible with a neoplastic process.  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  Marked adjacent vasogenic edema is noted with mass effect on the left lateral ventricle.  Subpleural seen herniation is noted measuring up to 1.1 cm.  There is approximately 1.0 cm of left-to-right midline shift.  No additional enhancing lesions are noted in the brain.  Basal cisterns are patent.  Mass effect on the left lateral ventricle is noted.  The 4th ventricle is within normal limits.  No intracranial hemorrhage.  No abnormal extra-axial collections.  Marked mucosal thickening in the left maxillary sinus is present.  No aggressive osseous lesions.            CONCLUSION:  1. Enhancing mass in the left frontal lobe with marked adjacent vasogenic edema as described above is concerning for primary CNS neoplasm   LOCATION:  [facility]    Dictated by (CST): Eduardo Alejandra MD on 4/10/2025 at 11:14 PM     Finalized by (CST): Eduardo Alejandra MD on 4/10/2025 at 11:17 PM       XR CHEST AP PORTABLE  (CPT=71045)  Result Date: 4/10/2025  PROCEDURE:  XR CHEST AP PORTABLE  (CPT=71045)  TECHNIQUE:  AP chest radiograph was obtained.  COMPARISON:  [facility] , XR, XR CHEST PA + LAT CHEST (CPT=71046), 10/21/2022, 10:10 AM.  INDICATIONS:  ams  PATIENT STATED HISTORY: (As transcribed by Technologist)  Patient offered no additional history at this time.   FINDINGS:  The cardiomediastinal silhouette is within normal limits.  Right chest port  terminates in the superior vena cava.  Elevation of the right hemidiaphragm with mild blunting of the right costophrenic angle is present.  Left lung is clear.  No consolidation or pneumothorax.  No aggressive osseous lesions are identified.            CONCLUSION:  New right chest port.  Elevation of the right hemidiaphragm with small right effusion is noted.   LOCATION:  [facility]      Dictated by (CST): Eduardo Alejandra MD on 4/10/2025 at 8:42 PM     Finalized by (CST): Eduardo Alejandra MD on 4/10/2025 at 8:43 PM       CT BRAIN OR HEAD (CPT=70450)  Result Date: 4/10/2025  PROCEDURE:  CT BRAIN OR HEAD (12317)  COMPARISON:  [facility] , CT, CT BRAIN OR HEAD (43010), 10/20/2022, 10:56 AM.  INDICATIONS:  alter mental status  TECHNIQUE:  Noncontrast CT scanning is performed through the brain. Dose reduction techniques were used. Dose information is transmitted to the ACR (American College of Radiology) NRDR (National Radiology Data Registry) which includes the Dose Index Registry.  PATIENT STATED HISTORY: (As transcribed by Technologist)  ams    FINDINGS:  VENTRICLES/SULCI:  Ventricles and sulci are normal in size.  INTRACRANIAL:  Vasogenic edema throughout the left frontal lobe.  There is masslike lesion within the subcortical white matter measuring approximately 26 x 20 mm could represent a primary neoplasm versus metastatic lesion within the brain.  Other etiologies including infection and in the differential.  Further evaluation using MRI with gadolinium would be recommended.  There is no acute hemorrhage.  There is 3 mm of midline shift to the right. SINUSES:           No sign of acute sinusitis.  MASTOIDS:          No sign of acute inflammation. SKULL:             No evidence for fracture or osseous abnormality. OTHER:             None.            CONCLUSION:  Significant vasogenic edema in left frontal lobe likely due to an underlying mass lesion which could represent a primary glioma of the brain versus metastatic lesion.   Also within the differential would be infection.  Further evaluation using MRI brain with gadolinium is recommended.    LOCATION:  [facility]   Dictated by (CST): Ivan Christian MD on 4/10/2025 at 8:27 PM     Finalized by (CST): Ivan Christian MD on 4/10/2025 at 8:30 PM                          [facility]      Differential Diagnosis  75-year-old female presents today for evaluation of several weeks of altered mental status.  Patient is presently awake and smiling during my assessment.  She nods along however is unable to answer any my questions directly.  Her neuroexam is unremarkable.  Her lungs are clear and her work of breathing is nonlabored.  Her belly is benign.  Differential would include intracranial hemorrhage, sepsis, UTI, electrolyte abnormality, hepatic encephalopathy.  Plan for CT of the head, chest x-ray along with labs.    9:00 pm  CT concerning for brain mass with surrounding edema.  I reviewed finding with neurosurgery Dr. Daigle.  Suspicion for neoplastic process.  Recommendation for 4 mg of Decadron.  We discussed patient being neurologically intact aside from her altered mentation, plan for admission to the hospital for further evaluation.  MRI with and without contrast ordered to better differentiate med versus primary CNS malignancy versus abscess.    10:00 pm  I spoke with the hospitalist in regards to admission.    A total of 35 minutes of critical care time (exclusive of billable procedures) was administered to manage the patient's neurologic instability due to her brain mass.  This involved direct patient intervention, complex decision making, and/or extensive discussions with the patient, family, and clinical staff.      Patient and significant other updated on finding of brain mass with plan for MRI tonight and requirement for admission to the hospital.  Patient remains awake without any obvious focal deficits.  The vocalized understanding of plan.    External Chart Reviewed  I reviewed her primary  care doctor note from earlier today along with the testing    Discussions of Management  Case reviewed with neurosurgery along with the hospitalist    Admission disposition: 4/10/2025 10:05 PM           Medical Decision Making      Disposition and Plan     Clinical Impression:  1. Brain mass         Disposition:  Admit  4/10/2025 10:05 pm    Follow-up:  No follow-up provider specified.        Medications Prescribed:  Current Discharge Medication List          Supplementary Documentation:         Hospital Problems       Present on Admission  Date Reviewed: 11/3/2022          ICD-10-CM Noted POA    * (Principal) Brain mass G93.89 4/10/2025 Unknown

## 2025-04-12 LAB
ALBUMIN SERPL-MCNC: 3.9 G/DL (ref 3.2–4.8)
ANION GAP SERPL CALC-SCNC: 8 MMOL/L (ref 0–18)
BUN BLD-MCNC: 19 MG/DL (ref 9–23)
CALCIUM BLD-MCNC: 9.4 MG/DL (ref 8.7–10.6)
CHLORIDE SERPL-SCNC: 109 MMOL/L (ref 98–112)
CO2 SERPL-SCNC: 24 MMOL/L (ref 21–32)
CREAT BLD-MCNC: 1.28 MG/DL (ref 0.55–1.02)
EGFRCR SERPLBLD CKD-EPI 2021: 43 ML/MIN/1.73M2 (ref 60–?)
ERYTHROCYTE [DISTWIDTH] IN BLOOD BY AUTOMATED COUNT: 13.1 %
GLUCOSE BLD-MCNC: 148 MG/DL (ref 70–99)
HCT VFR BLD AUTO: 36.9 % (ref 35–48)
HGB BLD-MCNC: 11.9 G/DL (ref 12–16)
MAGNESIUM SERPL-MCNC: 2.2 MG/DL (ref 1.6–2.6)
MCH RBC QN AUTO: 32.7 PG (ref 26–34)
MCHC RBC AUTO-ENTMCNC: 32.2 G/DL (ref 31–37)
MCV RBC AUTO: 101.4 FL (ref 80–100)
OSMOLALITY SERPL CALC.SUM OF ELEC: 297 MOSM/KG (ref 275–295)
PHOSPHATE SERPL-MCNC: 2.8 MG/DL (ref 2.4–5.1)
PLATELET # BLD AUTO: 183 10(3)UL (ref 150–450)
POTASSIUM SERPL-SCNC: 5.5 MMOL/L (ref 3.5–5.1)
RBC # BLD AUTO: 3.64 X10(6)UL (ref 3.8–5.3)
SODIUM SERPL-SCNC: 141 MMOL/L (ref 136–145)
WBC # BLD AUTO: 12.3 X10(3) UL (ref 4–11)

## 2025-04-12 PROCEDURE — 99232 SBSQ HOSP IP/OBS MODERATE 35: CPT | Performed by: NEUROLOGICAL SURGERY

## 2025-04-12 NOTE — CONSULTS
Infectious Disease Initial Consultation      Date of admission: 4/10/2025  7:14 PM     Date of service: 04/12/25 4:00 PM    Consult requested by: Sky Gruber DO    Reason for consult: Question of UTI    Chief complaint: Confusion    History of present illness: Sujey Medina is a 75 year old female with history of stage IV bladder cancer with lung metastases, status post cystectomy with ileal conduit that with resection of left upper lobe nodules currently on Enfortumab, history of depression and hypertension, hyperlipidemia, CKD stage IIIb, presents to the hospital with worsening confusion over the last week prior to admission.  She has not received her Enfortumab earlier this month because of her confusion.    In the emergency room, patient was afebrile, hemodynamically stable.  Labs revealed mild leukocytosis white count of 11.8 with a left shift.  BMP showed creatinine of 1.48.  LFTs were unremarkable.  The patient has a chronic Barbosa from which UA was obtained and showed more than 50 white blood cells.  CT of the brain showed significant vasogenic edema in the left frontal lobe likely due to underlying mass lesion.  Chest x-ray did not show any evidence of pneumonia.  MRI of the brain showed an enhancing mass in the left frontal lobe with marked adjacent vasogenic edema.  CT chest, abdomen pelvis does not show any acute processes.  No antibiotics were given to the patient; however, she was started on dexamethasone.  Urine culture growing multiple species consistent with probable contamination.  ID were consulted as the patient is currently planned for possible surgical resection pending conversation with medical oncology.    Review of systems:  All other components of the review of systems are negative, except those described in the history of present illness.     Past Medical History[1]  Past Surgical History[2]  Short Social Hx on File[3]  Family History[4]  Reviewed, see above    Medications:  Current  Hospital Medications[5]     Allergies:  Allergies[6]    Physical Exam:  Vitals:    04/12/25 1300   BP: 119/68   Pulse: 64   Resp: 18   Temp: 97.6 °F (36.4 °C)     Vitals signs and nursing note reviewed.   Constitutional:       Appearance: Normal appearance.   HENT:      Head: Normocephalic and atraumatic.      Mouth: Mucous membranes are moist.   Neck:      Musculoskeletal: Neck supple.   Cardiovascular:      Rate and Rhythm: Normal rate.  Pulmonary:      Effort: Pulmonary effort is normal. No respiratory distress.   Abdominal:      General: Abdomen is flat. There is no distension.      Palpations: Abdomen is soft. There is no mass.      Tenderness: There is no tenderness. There is no guarding or rebound.      Hernia: No hernia is present.   Musculoskeletal:      Right lower leg: No edema.      Left lower leg: No edema.   Skin:     General: Skin is warm and dry.   Neurological:      General: No focal deficit present.      Mental Status: Alert and oriented to person, place, and time.     Laboratory data:  I have independently reviewed all lab results; including old microbiological results.  Lab Results   Component Value Date    WBC 12.3 04/12/2025    HGB 11.9 04/12/2025    HCT 36.9 04/12/2025    .0 04/12/2025    CREATSERUM 1.28 04/12/2025    BUN 19 04/12/2025     04/12/2025    K 5.5 04/12/2025     04/12/2025    CO2 24.0 04/12/2025     04/12/2025    CA 9.4 04/12/2025    ALB 3.9 04/12/2025    MG 2.2 04/12/2025    PHOS 2.8 04/12/2025        Recent Labs   Lab 04/11/25  0533 04/12/25  0526   RBC 3.71* 3.64*   HGB 12.4 11.9*   HCT 36.3 36.9   MCV 97.8 101.4*   MCH 33.4 32.7   MCHC 34.2 32.2   RDW 13.1 13.1   NEPRELIM 7.98*  --    WBC 9.2 12.3*   .0 183.0       Microbiology data:  Hospital Encounter on 04/10/25   1. Urine Culture, Routine     Status: None    Collection Time: 04/10/25  8:31 PM    Specimen: Urine, clean catch   Result Value Ref Range    Urine Culture  N/A     >100,000 cfu/ml  Multiple species present- probable contamination.   2. Blood Culture     Status: None (Preliminary result)    Collection Time: 04/10/25  8:24 PM    Specimen: Blood,peripheral   Result Value Ref Range    Blood Culture Result No Growth 1 Day N/A         Radiology:  I have reviewed all imaging data available independently.   CT chest, abdomen and pelvis  No acute findings    MRI of the brain on 4/10/2025  Enhancing mass in the left frontal lobe    Impression:  Sujey Medina is a 75 year old female with     Pyuria  Urine culture growing multiple organisms consistent with colonization of her urostomy   The patient is not having any abdominal pain or symptoms consistent with an infection.   Remains afebrile.   Confusion improved with steroids. Antibiotics were not given.   Furthermore, her CT is not showing evidence of cystitis or pyelonephritis  The patient does not have a UTI at the moment  Her mentation and confusion is caused by the brain lesion    Recommendations:     No indication for antimicrobial therapy as it is expected for her urostomy to be chronically colonized with multiple organisms  See discussion above  Management of brain mass as per neurosurgery and oncology  ID will sign off, please call us with any questions or change of status.  Thank you for this consultation.  Ok to undergo neurosurgical procedure from ID perspective.     The plan of care was discussed with the primary hospital team, Sky Gruber DO     Recommendations were also discussed with the patient; all questions were answered.     Thank you for this consultation. Please don't hesitate to call the ID team for questions or any acute changes in patient's clinical condition.    Please note that this report has been produced using speech recognition software and may contain errors related to that system including, but not limited to, errors in grammar, punctuation, and spelling, as well as words and phrases that possibly may have been  recognized inappropriately.  If there are any questions or concerns, contact the dictating provider for clarification.    The  Cures Act makes medical notes like these available to patients in the interest of transparency. Please be advised this is a medical document. Medical documents are intended to carry relevant information, facts as evident, and the clinical opinion of the practitioner. The medical note is intended as peer to peer communication and may appear blunt or direct. It is written in medical language and may contain abbreviations or verbiage that are unfamiliar.     Tommy Carlton MD  [facility]. Tel: [phone]. Fax: [phone].     Sujey Medina : 3/6/1950 MRN: [id number] CSN: [id number]          [1]   Past Medical History:   Cancer (HCC)    CKD (chronic kidney disease), stage III (HCC)    HYPERLIPIDEMIA    Hypertension    OSTEOPENIA    Personal history of bladder cancer    S/P ileoconduit   [2]   Past Surgical History:  Procedure Laterality Date    Benign biopsy right  1988    Colon ca scrn not hi rsk ind N/A 10/28/2015    Procedure: COLONOSCOPY, POSSIBLE BIOPSY, POSSIBLE POLYPECTOMY 24478;  Surgeon: Lai Heard MD;  Location: [facility]    Colonoscopy      diverticulosis, hemorrhoids    Colonoscopy,diagnostic  10/28/15    diverticulosis    Cystoscopy,insert ureteral stent  3/12/10    Performed by BARNEY IZAGUIRRE at [facility]    Cystoscopy,insert ureteral stent  9/10/2010    Performed by BARNEY IZAGUIRRE at [facility]    Cystoscopy,remv calculus,simple  2010    Performed by BARNEY IZAGUIRRE at [facility]    Cystourethroscopy  3/2/2011    Performed by APRIL NO at [facility]    Cystourethroscopy,biopsy  2010    Performed by BARNEY IZAGUIRRE at [facility]    Cystourethroscopy,fulgur 2-5cm lesn  3/12/10    Performed by BAR[facility]ZAGUIRRE at [facility]    Cystourethroscopy,fulgur 2-5cm lesn  9/10/2010    Performed by BARNEY IZAGUIRRE at [facility]    Fluor gid ctr vad plmt rplcmt/rmvl  3/14/2011    Performed by TANESHA ALEXANDRE at [facility]    Insertion, tunneled centrally inserted venous access device, w/subq port; >5 years  3/14/2011    Performed by TANESHA ALEXANDRE at [facility]    Other surgical history  22 yrs ago    right breast bx    Other surgical history  2-23-09    cysto, Dr. Izaguirre    Other surgical history  6-3-10    cysto-poss [id number]. [facility]rick    Other surgical history  9/10/10    cysto, URS, RPG, TURBT, stent exchange-Dr. Izaguirre    Other surgical history  2/8/11    cysto- Dr. Izaguirre    Other surgical history      thoracic surgery    Other surgical history  11/20/13     VATS RUL    Patient documented not to have experienced any of the following events N/A 10/28/2015    Procedure: COLONOSCOPY, POSSIBLE BIOPSY, POSSIBLE POLYPECTOMY 46641;  Surgeon: Lai Heard MD;  Location: [facility]    Patient withough preoperative order for iv antibiotic surgical site infection prophylaxis. N/A 10/28/2015    Procedure: COLONOSCOPY, POSSIBLE BIOPSY, POSSIBLE POLYPECTOMY 46754;  Surgeon: Lai Heard MD;  Location: [facility]    Removal of tunneled cva device, with subq port or pump, central or peripheral insertion  12/17/2013    Procedure: PORT-A-CATH REMOVAL;  Surgeon: Bobby Palmer MD;  Location: [facility]    Skin surgery  06/13/2019    MOHS, BCC - nasal dorsum, Dr. PATRICIA Ghosh    Tubal ligation      X-ray retrograde pyelogram  3/12/10    Performed by BARNEY IZAGUIRRE at [facility]    X-ray retrograde pyelogram  9/10/2010    Performed by BARNEY IZAGUIRRE at [facility]   [3]   Social History  Socioeconomic History    Marital status:     Number of children: 0   Occupational History    Occupation: Semi retired     Tobacco Use    Smoking status: Never    Smokeless tobacco: Never   Vaping Use    Vaping status: Never Used   Substance and Sexual Activity    Alcohol use: Not Currently     Alcohol/week: 2.0 standard drinks of alcohol     Types: 2 Standard drinks or equivalent per week    Drug use: No   Other Topics Concern    Seat Belt Yes   Social History Narrative    Semi retired . . G0     Social Drivers of Health     Food Insecurity: No Food Insecurity (4/10/2025)    NCSS - Food Insecurity     Worried About Running Out of Food in the Last Year: No     Ran Out of Food in the Last Year: No   Transportation Needs: No Transportation Needs (4/10/2025)    NCSS - Transportation     Lack of Transportation: No   Housing Stability: Not At Risk (4/10/2025)    NCSS - Housing/Utilities     Has Housing: Yes     Worried About Losing Housing: No     Unable to Get Utilities: No   [4]   Family History  Problem Relation Age of Onset    Other (Other) Father         suicide    Heart Disorder Mother     Heart Disorder Maternal Grandmother     Other (Other) Sister         mentally disabled    Cancer Neg    [5]   sodium zirconium cyclosilicate    dexamethasone    atorvastatin    lactated ringers    heparin    acetaminophen    buPROPion SR    busPIRone    metoprolol tartrate  [6] No Known Allergies

## 2025-04-12 NOTE — PLAN OF CARE
Assumed care @0730  VSS,   A&Ox3, expressive aphasia.  RA  NSR ,   Denies Pain, Up with standby assist as tolerated.    Tolerating Regular diet.  Urostomy with yellow/malodorous urine  IVF infusing LR @100ml/hr    Updated POC with patient and .

## 2025-04-12 NOTE — PROGRESS NOTES
[facility]   part of [facility]    Neurosurgery Progress Note  2025    Sujey Medina Patient Status:  Inpatient    3/6/1950 MRN [id number]   Location [facility] 7NE-A Attending Sky Gruber, DO   Hosp Day # 2 PCP Rajni Tiwari MD     SUBJECTIVE:  Sujey Medina is a(n) 75 year old female evaluated today in hospital follow-up.  No new complaints.    According to family, she has had some more word finding difficulty recently.  She and her  were in [place] for several months.  She has been more withdrawn.      OBJECTIVE / PHYSICAL EXAM:  Vital Signs:  Blood pressure 136/67, pulse 73, temperature 97.5 °F (36.4 °C), temperature source Oral, resp. rate 18, height 65\", weight 146 lb (66.2 kg), SpO2 96%, not currently breastfeeding.  On examination, the patient is alert and oriented x 3.  No pronator drift.      Lab Results (last 24 hours):  Recent Results (from the past 24 hours)   Magnesium    Collection Time: 25  5:26 AM   Result Value Ref Range    Magnesium 2.2 1.6 - 2.6 mg/dL   Renal Function Panel    Collection Time: 25  5:26 AM   Result Value Ref Range    Glucose 148 (H) 70 - 99 mg/dL    Sodium 141 136 - 145 mmol/L    Potassium 5.5 (H) 3.5 - 5.1 mmol/L    Chloride 109 98 - 112 mmol/L    CO2 24.0 21.0 - 32.0 mmol/L    Anion Gap 8 0 - 18 mmol/L    BUN 19 9 - 23 mg/dL    Creatinine 1.28 (H) 0.55 - 1.02 mg/dL    Calcium, Total 9.4 8.7 - 10.6 mg/dL    Calculated Osmolality 297 (H) 275 - 295 mOsm/kg    eGFR-Cr 43 (L) >=60 mL/min/1.73m2    Albumin 3.9 3.2 - 4.8 g/dL    Phosphorus 2.8 2.4 - 5.1 mg/dL   CBC, Platelet; No Differential    Collection Time: 25  5:26 AM   Result Value Ref Range    WBC 12.3 (H) 4.0 - 11.0 x10(3) uL    RBC 3.64 (L) 3.80 - 5.30 x10(6)uL    HGB 11.9 (L) 12.0 - 16.0 g/dL    HCT 36.9 35.0 - 48.0 %    .0 150.0 - 450.0 10(3)uL    .4 (H) 80.0 - 100.0 fL    MCH 32.7 26.0 - 34.0 pg    MCHC 32.2 31.0 - 37.0 g/dL    RDW 13.1 %       Review of  Imaging  MRI scan of the brain reviewed.  This demonstrates a large left frontal enhancing lesion.    CT of the chest, abdomen, and pelvis demonstrates no evidence of malignancy.    Assessment/Plan:  1.  Left frontal lesion.  This likely represents a metastatic deposit.    Anticipate further discussion regarding surgical resection, pending conversation with medical oncology.    The patient also has a urinary tract infection.  This is presently being treated.  This should be treated prior to surgical resection.    We will follow-up in the next day or 2, to have a more detailed discussion about treatment options.        4/12/2025  11:25 AM    This report was dictated using voice recognition technology.  Errors in syntax or recognition may occur, and should not be construed to change the meaning of a sentence.

## 2025-04-12 NOTE — OCCUPATIONAL THERAPY NOTE
OCCUPATIONAL THERAPY EVALUATION - INPATIENT    Room Number: 7617/7617-A  Evaluation Date: 4/12/2025     Type of Evaluation: Initial  Presenting Problem: Altered mental status x 4 wks, UTI, Brain CT showing L frontal mass    Physician Order: IP Consult to Occupational Therapy  Reason for Therapy:  ADL/IADL Dysfunction and Discharge Planning    OCCUPATIONAL THERAPY ASSESSMENT   Patient is a 75 year old female admitted on 4/10/2025 with Presenting Problem: Altered mental status x 4 wks, UTI, Brain CT showing L frontal mass. Co-Morbidities : Bladder CA with lung mets, depression, HTN, HL, osteopenia, CKDIII  Patient is currently functioning near baseline with  all ADL .  Prior to admission, patient's baseline is independent/supervision.  Patient met all OT goals at near baseline level.  Patient reports no further questions/concerns at this time.     Recommend discharge home with supervision for safety during LB dressing, showering, and household tasks.      EVALUATION SESSION:  Patient at start of session: supine    FUNCTIONAL TRANSFER ASSESSMENT  Sit to Stand: Chair  Edge of Bed: Supervision  Chair: Supervision    BED MOBILITY  Supine to Sit : Supervision    BALANCE ASSESSMENT  Static Sitting: Independent  Static Standing: Supervision    FUNCTIONAL ADL ASSESSMENT  LB Dressing Seated: Independent  LB Dressing Standing: Supervision  Toileting Standing: Supervision    COGNITION  Safety awareness - limited, cueing for environmental awareness, problem solving     Upper Extremity:   ROM: within functional limits   Strength: is within functional limits   Coordination:  Gross motor:   Fine motor: intact    EDUCATION PROVIDED  Patient Education : Role of Occupational Therapy; Plan of Care; Other; Proper Body Mechanics (safety education)  Patient's Response to Education: Demonstrates Poor Carry Over to Information; Returned Demonstration    Therapist comments: supine to sit supervision. Oriented to place, but unable to recall  month (with cueing ok).  Noted wet bed line and gown.  Patient did not seem to be aware of the soiled linen and gown. When asked about needing to go to the bathroom, pt mentioned \"no\" as she was standing and dribbling.  Assisted with hygiene care.  Supervision/set up for gown changes and donning pull up brief.   Supervision for ambulation without device. Fine motor intact. Alarm on.       Patient End of Session: Up in chair, Needs met, Call light within reach, RN aware of session/findings, All patient questions and concerns addressed, Alarm set    OCCUPATIONAL PROFILE    HOME SITUATION  Type of Home: [employer]  Home Layout: One level, Elevator  Lives With: Spouse                     Drives: Yes  Patient Regularly Uses: None    Prior Level of Function: independent with ADL.    PAIN ASSESSMENT  Ratin          OBJECTIVE  Precautions: Bed/chair alarm  Fall Risk: Standard fall risk    WEIGHT BEARING RESTRICTION       AM-PAC ‘6-Clicks’ Inpatient Daily Activity Short Form  -   Putting on and taking off regular lower body clothing?: A Little (supervision)  -   Bathing (including washing, rinsing, drying)?: A Little (supervision)  -   Toileting, which includes using toilet, bedpan or urinal? : A Little (supervision)  -   Putting on and taking off regular upper body clothing?: None  -   Taking care of personal grooming such as brushing teeth?: None  -   Eating meals?: None    AM-PAC Score:  Score: 21  Approx Degree of Impairment: 32.79%  Standardized Score (AM-PAC Scale): 44.27      PLAN   Patient has been evaluated and presents with no skilled Occupational Therapy needs at this time.  Patient discharged from Occupational Therapy services.  Please re-order if a new functional limitation presents during this admission.         Patient Evaluation Complexity Level:   Occupational Profile/Medical History LOW - Brief history including review of medical or therapy records    Specific performance deficits impacting engagement in  ADL/IADL LOW  1 - 3 performance deficits    Client Assessment/Performance Deficits LOW - No comorbidities nor modifications of tasks    Clinical Decision Making LOW - Analysis of occupational profile, problem-focused assessments, limited treatment options    Overall Complexity LOW     OT Session Time: 22 minutes  Self-Care Home Management: 8 minutes

## 2025-04-12 NOTE — PROGRESS NOTES
[facility] Hospitalist Progress Note     CC: Hospital Follow up    PCP: Rajni Tiwari MD       Subjective:     No changes overnight, patient says she feels slightly better.    OBJECTIVE:    Blood pressure 119/68, pulse 64, temperature 97.6 °F (36.4 °C), temperature source Oral, resp. rate 18, height 5' 5\" (1.651 m), weight 146 lb (66.2 kg), SpO2 98%, not currently breastfeeding.    Temp:  [97.3 °F (36.3 °C)-97.7 °F (36.5 °C)] 97.6 °F (36.4 °C)  Pulse:  [64-77] 64  Resp:  [16-18] 18  BP: ()/(51-79) 119/68  SpO2:  [95 %-98 %] 98 %      Intake/Output:    Intake/Output Summary (Last 24 hours) at 4/12/2025 1526  Last data filed at 4/12/2025 1400  Gross per 24 hour   Intake 480 ml   Output 2250 ml   Net -1770 ml       Last 3 Weights   04/10/25 2323 146 lb (66.2 kg)   04/10/25 1922 154 lb (69.9 kg)   10/22/22 1100 123 lb 7.3 oz (56 kg)   10/22/22 1153 123 lb 7.3 oz (56 kg)       Exam   Gen: Alert, no acute distress  Heent: Normocephalic, atraumatic, neck supple, EOMI, PERRLA  Pulm: Lungs CTAB, normal respiratory effort  CV:  Regular rate and rhythm, no murmurs/rubs/gallops  Abd: Soft, nontender, nondistended, bowel sounds present  Extremities: No peripheral edema, no clubbing, pulses intact   Skin: No rashes or lesions  Neuro: AOx2, no focal neurologic deficits, CN II-XII grossly intact  Psych: Pleasantly confused      Data Review:       Labs:     Recent Labs   Lab 04/10/25  2025 04/11/25  0533 04/12/25  0526   RBC 3.93 3.71* 3.64*   HGB 13.1 12.4 11.9*   HCT 39.2 36.3 36.9   MCV 99.7 97.8 101.4*   MCH 33.3 33.4 32.7   MCHC 33.4 34.2 32.2   RDW 13.0 13.1 13.1   NEPRELIM 8.44* 7.98*  --    WBC 11.8* 9.2 12.3*   .0 180.0 183.0         Recent Labs   Lab 04/10/25  2024 04/11/25  0555 04/12/25  0526   * 146* 148*   BUN 30* 18 19   CREATSERUM 1.48* 1.14* 1.28*   EGFRCR 36* 50* 43*   CA 10.1 9.5 9.4    137 141   K 3.8 5.2*  5.2* 5.5*    109 109   CO2 24.0 16.0* 24.0       Recent Labs    Lab 04/10/25  2024 04/12/25  0526   ALT 17  --    AST 27  --    ALB 4.5 3.9         Imaging:  CT CHEST+ABDOMEN+PELVIS(ALL CNTRST ONLY)(XDO=71665/36612)  Result Date: 4/11/2025  CONCLUSION:   No acute process or evidence of malignancy within the chest, abdomen, or pelvis.    LOCATION:  [facility]    Dictated by (CST): Mir Gruber MD on 4/11/2025 at 1:20 PM     Finalized by (CST): Mir Gruber MD on 4/11/2025 at 1:34 PM       MRI BRAIN (W+WO) (CPT=70553)  Result Date: 4/10/2025  CONCLUSION:  1. Enhancing mass in the left frontal lobe with marked adjacent vasogenic edema as described above is concerning for primary CNS neoplasm   LOCATION:  [facility]    Dictated by (CST): Eduardo Alejandra MD on 4/10/2025 at 11:14 PM     Finalized by (CST): Eduardo Alejandra MD on 4/10/2025 at 11:17 PM       XR CHEST AP PORTABLE  (CPT=71045)  Result Date: 4/10/2025  CONCLUSION:  New right chest port.  Elevation of the right hemidiaphragm with small right effusion is noted.   LOCATION:  [facility]      Dictated by (CST): Eduardo Alejandra MD on 4/10/2025 at 8:42 PM     Finalized by (CST): Eduardo Alejandra MD on 4/10/2025 at 8:43 PM       CT BRAIN OR HEAD (CPT=70450)  Result Date: 4/10/2025  CONCLUSION:  Significant vasogenic edema in left frontal lobe likely due to an underlying mass lesion which could represent a primary glioma of the brain versus metastatic lesion.  Also within the differential would be infection.  Further evaluation using MRI brain with gadolinium is recommended.    LOCATION:  [facility]   Dictated by (CST): Ivan Christian MD on 4/10/2025 at 8:27 PM     Finalized by (CST): Ivan Christian MD on 4/10/2025 at 8:30 PM           Meds:     Scheduled Medications[1]  Medication Infusions[2]  PRN Medications[3]       Assessment/Plan:     75 year old female with PMH of stage IV bladder cancer with lung metastases s/p cystectomy with ileal conduit and wedge resection of TONNY nodules currently on Enfortumab, depression, hypertension, hyperlipidemia,  osteopenia, CKD stage IIIb is presenting to the hospital for altered mental status.     Left frontal lobe mass with vasogenic edema  Encephalopathy 2/2 above  -Imminent threat to neurologic function given patient's altered mental status, could be primary CNS tumor versus metastases  -Discussed with neurosurgery, continue Decadron 4 mg every 6 hours  -CT chest/abdomen/pelvis reviewed, unremarkable for any other metastases  -Discussed with  oncology, will need radiation oncology outpatient  -Plan for possible intervention with neurosurgery in the next few days     Stage IV bladder cancer  Lung metastases  S/p cystectomy with ileal conduit  History of wedge resection of TONNY nodes  -Follows with Dr. Woody outpatient, received one dose enfortumab earlier this month  -Oncology consulted inpatient    Concern for CAUTI, hx of chronic thacker   -Urinalysis with WBCs, leukocyte esterase, and nitrites.  Urine cultures reviewed, multiple organisms noted, possibly contaminated. Unclear if this is a true infection versus chronic colonization.  Could be contributing to mental status.  -ID consulted, appreciate recommendations    Hyperkalemia  -Potassium 5.5 this morning  -Lokelma ordered  -Recheck potassium tomorrow morning    CKD stage IIIb  -Creatinine 1.28, near baseline  -Serial RFP's    Hyperlipidemia  -Continue home statin     Depression  -Continue home bupropion, buspirone     Hypertension  -Continue home beta-blocker    Dispo: Inpatient    Outpatient records reviewed. Questions/concerns were discussed with patient and/or family by bedside.  Discussed with neurosurgery, oncology.    DO Faye Sampson [facility]  Hospitalist  Contact via [employer]      Supplementary Documentation:   DVT Mechanical Prophylaxis:   SCDs,    DVT Pharmacologic Prophylaxis   Medication    heparin (Porcine) 5000 UNIT/ML injection 5,000 Units                Code Status: Full Code  Thacker: No urinary catheter in place  Thacker  Duration (in days):   Central line:    LYNNETTE:         **Certification      PHYSICIAN Certification of Need for Inpatient Hospitalization - Initial Certification    Patient will require inpatient services that will reasonably be expected to span two midnight's based on the clinical documentation in H+P.   Based on patients current state of illness, I anticipate that, after discharge, patient will require TBD.             [1]    sodium zirconium cyclosilicate  10 g Oral Q8H    dexamethasone  4 mg Intravenous Q6H    atorvastatin  20 mg Oral Nightly    heparin  5,000 Units Subcutaneous 2 times per day    buPROPion SR  150 mg Oral Daily    busPIRone  5 mg Oral BID    metoprolol tartrate  25 mg Oral BID   [2]    lactated ringers 100 mL/hr at 04/11/25 0009   [3]   acetaminophen

## 2025-04-12 NOTE — PROGRESS NOTES
Oncology Progress note       Bladder cancer on treatment with enfortumab  Now noted to have a brain mass after she presented with MS changes.        HPI:   States she is feeling ok  Knows she came in due to confusion  Is aware of location, but not able to give year or president's name   Denying pain     Past Medical History[1]  Social Hx on file[2]  Vitals:    04/12/25 0856   BP:    Pulse: 69   Resp:    Temp:      GEN: alert; nad.   HEENT: normocephalic  HEART: regular rate  LUNGS: clear to auscultation  eXT: no edema    A/P:  75 year old with metastatic bladder cancer currently on enfortumab, here with new brain met, cerebral edema    Brain mets  -presented with confusion  -MRI shows left frontal lobe mass, measuring 5.7 cm with vasogenic edema. Has been started on dex  Neurosurgery on case  CT c/a/p does not show any acute findings    2. Metastatic bladder cancer  -on treatment with enfortumab  -? Brain mets vs new cns primary  -await neurosurg workup    PLAN:  -continue Dex for now  -await further recommendations from neurosurgery  -will need radiation oncology--can be arranged as outpatient, pending clinical course  -outpatient follow up with Dr. Woody after discharge  -discussed with patient          [1]   Past Medical History:   Cancer (HCC)    CKD (chronic kidney disease), stage III (HCC)    HYPERLIPIDEMIA    Hypertension    OSTEOPENIA    Personal history of bladder cancer    S/P ileoconduit   [2]   Social History  Socioeconomic History    Marital status:     Number of children: 0   Occupational History    Occupation: Semi retired    Tobacco Use    Smoking status: Never    Smokeless tobacco: Never   Vaping Use    Vaping status: Never Used   Substance and Sexual Activity    Alcohol use: Not Currently     Alcohol/week: 2.0 standard drinks of alcohol     Types: 2 Standard drinks or equivalent per week    Drug use: No   Other Topics Concern    Seat Belt Yes

## 2025-04-12 NOTE — PHYSICAL THERAPY NOTE
PHYSICAL THERAPY EVALUATION - INPATIENT     Room Number: 7617/7617-A  Evaluation Date: 4/12/2025  Type of Evaluation: Initial  Physician Order: PT Eval and Treat    Presenting Problem: Brain mass  Co-Morbidities : Bladder CA with lung mets, depression, HTN, HL, osteopenia, CKDIII  Reason for Therapy: Mobility Dysfunction and Discharge Planning    PHYSICAL THERAPY ASSESSMENT   Patient is a 75 year old female admitted 4/10/2025 for confusion x4 weeks.  Patient with h/o metastatic urothelial carcinoma to the lung a/p chemotherapy and radical bladder resection and urostomy 2011.  Pt with mets to the lungs s/p bronch, R VATS with RML lobectomy on 1/24/2024.   Pt admitted on 4/12/25 with new findings of brain mass.  Patient is currently functioning at physical baseline with bed mobility, transfers, ambulation and stairs, but due to cognitive limitations (impaired memory, command following, and insight into safety and deficits) pt requires continuous supervision for safety.    Patient has no additional skilled therapy services needs, but will benefit increased support at home.    PLAN  Patient has been evaluated and presents with no inpatient physical therapy needs.  Patient discharged from inpatient physical therapy services at this time.   Please reconsult if there is a change in status that would necessitate PT services.      PT Device Recommendation: None    GOALS  Patient was able to achieve the following goals ...    Patient was able to transfer At previous, functional level   Patient able to ambulate on level surfaces At previous, functional level     HOME SITUATION  Type of Home: Condo  Home Layout: One level, Elevator  Stairs to Enter : 0        Stairs to Bedroom: 0         Lives With: Spouse    Drives: Yes   Patient Regularly Uses: None     Prior Level of [place]: The pt reports she is typically independent with ambulation and I/ADL's.    SUBJECTIVE  When asked the month, \"I can picture it, but I can't say  it.  I don't know.\"    OBJECTIVE  Precautions: Bed/chair alarm  Fall Risk: Standard fall risk    WEIGHT BEARING RESTRICTION     PAIN ASSESSMENT  Ratin          COGNITION  Overall Cognitive Status:  Impaired  Arousal/Alertness:  delayed responses to stimuli  Orientation Level:  oriented to place, oriented to person, disoriented to time, and disoriented to situation  Memory:  decreased long term memory and decreased short term memory  Following Commands:  follows one step commands with increased time and follows one step commands with repetition  Initiation: cues to initiate tasks  Safety Judgement:  decreased awareness of need for assistance and decreased awareness of need for safety  Awareness of Deficits:  decreased awareness of deficits    RANGE OF MOTION AND STRENGTH ASSESSMENT  Upper extremity ROM and strength are within functional limits, grossly 5/5    Lower extremity ROM is within functional limits     Lower extremity strength is within functional limits, grossly 5/5    BALANCE  Static Sitting: Good  Dynamic Sitting: Good  Static Standing: Good  Dynamic Standing: Fair +    ADDITIONAL TESTS  4 Item Dynamic Gait Index Score: 1112 Fall Risk: no  [Scores of 10 or less indicate increased risk for falls]  Gait level surface: 3  Grading: Thomas the lowest category that applies.  (3)  Normal: Walks 20’, no assistive devices, good speed, no evidence of imbalance, normal gait pattern.  (2)  Mild Impairment: Walks 20’, uses assistive devices, slower speed, mild gait deviations.  (1)  Moderate Impairment: Walks 20’, slow speed, abnormal gait pattern, evidence of imbalance.  (0)  Severe Impairment: Cannot walk 20’ without assistance, severe gait deviations or imbalance.     Change in gait speed: 3  Grading: Thomas the lowest category that applies.  (3)  Normal: Able to smoothly change walking speed without loss of balance or gait deviation. Shows a significant difference in walking speed between normal, fast and slow  speeds.   (2)  Mild Impairment: Is able to change speed but demonstrates mild gait deviations, or no gait deviations but unable to achieve a significant change in velocity, or uses an assistive device.   (1)  Moderate Impairment: Makes only minor adjustments to walking speed, or accomplishes a change in speed with significant gait deviations, or changes speed but has significant gait deviations, or changes speed but loses balance but is able to recover and continue walking.  (0)  Severe Impairment: Cannot change speeds, or loses balance and has to reach for wall or be caught.    Gait with horizontal head turns: 2  (3)  Normal: Performs head turns smoothly with no change in gait  (2)  Mild Impairment: Performs head turns smoothly with slight change in gait velocity, i.e., minor disruption to smooth gait path or uses walking aid.  (1) Moderate Impairment: Performs head turns with moderate change in gait velocity, slows down, staggers but recovers, can continue to walk.  (0)  Severe Impairment: Performs task with severe disruption of gait, i.e., staggers outside 15” path, loses balance, stops, reaches for wall.    Gait with vertical head turns: 3  Grading: Thomas the lowest category that applies.  (3) Normal: Performs head turns smoothly with no change in gait.  (2) Mild Impairment: Performs head turns smoothly with slight change in gait velocity, i.e., minor disruption to smooth gait path or uses walking aid.  (1) Moderate Impairment: Performs head turns with moderate change in gait velocity, slows down, staggers but recovers, can continue to walk.  (0) Severe Impairment: Performs task with severe disruption of gait, i.e., staggers outside 15” path, loses balance, stops, reaches for wall.      ACTIVITY TOLERANCE  Pulse: 69  Heart Rate Source: Monitor                   O2 WALK  Oxygen Therapy  SPO2% on Room Air at Rest: 98    NEUROLOGICAL FINDINGS  Neurological Findings: Coordination - Finger to Nose, Coordination - Heel  to Rogel, Coordination - Rapid Alternating Movement, Sensation  Coordination - Finger to Nose: Symmetrical  Coordination - Heel to Shin: Symmetrical  Coordination - Rapid Alternating Movement: Symmetrical  Sensation: Intact to light touch, pt denies numbless/tingling         AM-PAC '6-Clicks' INPATIENT SHORT FORM - BASIC MOBILITY  How much difficulty does the patient currently have...  Patient Difficulty: Turning over in bed (including adjusting bedclothes, sheets and blankets)?: None   Patient Difficulty: Sitting down on and standing up from a chair with arms (e.g., wheelchair, bedside commode, etc.): None   Patient Difficulty: Moving from lying on back to sitting on the side of the bed?: None   How much help from another person does the patient currently need...   Help from Another: Moving to and from a bed to a chair (including a wheelchair)?: A Little   Help from Another: Need to walk in hospital room?: A Little   Help from Another: Climbing 3-5 steps with a railing?: A Little       AM-PAC Score:  Raw Score: 21   Approx Degree of Impairment: 28.97%   Standardized Score (AM-PAC Scale): 50.25   CMS Modifier (G-Code):     FUNCTIONAL ABILITY STATUS  Gait Assessment   Functional Mobility/Gait Assessment  Gait Assistance: Supervision  Distance (ft): 300  Assistive Device: None  Pattern:  (step through gait)  Stairs: Stairs  How Many Stairs: 3  Device: 1 Rail  Assist: Supervision  Pattern: Ascend and Descend  Ascend and Descend : Reciprocal    Skilled Therapy Provided     Bed Mobility:  Rolling: Independent  Supine to sit: Independent   Sit to supine: Independent     Transfer Mobility:  Sit to stand: Independent   Stand to sit: Independent  Gait:  SBA for safety with path finding and safety  Stairs: SBA for safety, verbal cues for use of handrail    Therapist's comments: Upon approach for therapy pt supine in bed, noted to be incontinent of urine.  Pt unaware that clothing and bedding were soaked with urine.  Pt  assisted with hygiene care, doffing wet gown, donning clean gown and brief.      Patient End of Session: Up in chair, Needs met, Call light within reach, RN aware of session/findings, All patient questions and concerns addressed, Hospital anti-slip socks, Alarm set    Patient Evaluation Complexity Level:  History High - 3 or more personal factors and/or co-morbidities   Examination of body systems Moderate - addressing a total of 3 or more elements   Clinical Presentation  Moderate - Evolving   Clinical Decision Making Low Complexity       PT Session Time: 30 minutes

## 2025-04-13 LAB
ALBUMIN SERPL-MCNC: 3.7 G/DL (ref 3.2–4.8)
ANION GAP SERPL CALC-SCNC: 9 MMOL/L (ref 0–18)
BUN BLD-MCNC: 26 MG/DL (ref 9–23)
CALCIUM BLD-MCNC: 9 MG/DL (ref 8.7–10.6)
CHLORIDE SERPL-SCNC: 109 MMOL/L (ref 98–112)
CO2 SERPL-SCNC: 23 MMOL/L (ref 21–32)
CREAT BLD-MCNC: 1.21 MG/DL (ref 0.55–1.02)
EGFRCR SERPLBLD CKD-EPI 2021: 46 ML/MIN/1.73M2 (ref 60–?)
ERYTHROCYTE [DISTWIDTH] IN BLOOD BY AUTOMATED COUNT: 13.2 %
GLUCOSE BLD-MCNC: 142 MG/DL (ref 70–99)
HCT VFR BLD AUTO: 33.4 % (ref 35–48)
HGB BLD-MCNC: 11.4 G/DL (ref 12–16)
MAGNESIUM SERPL-MCNC: 2.1 MG/DL (ref 1.6–2.6)
MCH RBC QN AUTO: 32.9 PG (ref 26–34)
MCHC RBC AUTO-ENTMCNC: 34.1 G/DL (ref 31–37)
MCV RBC AUTO: 96.5 FL (ref 80–100)
OSMOLALITY SERPL CALC.SUM OF ELEC: 299 MOSM/KG (ref 275–295)
PHOSPHATE SERPL-MCNC: 3.1 MG/DL (ref 2.4–5.1)
PLATELET # BLD AUTO: 195 10(3)UL (ref 150–450)
POTASSIUM SERPL-SCNC: 4.2 MMOL/L (ref 3.5–5.1)
RBC # BLD AUTO: 3.46 X10(6)UL (ref 3.8–5.3)
SODIUM SERPL-SCNC: 141 MMOL/L (ref 136–145)
WBC # BLD AUTO: 13.5 X10(3) UL (ref 4–11)

## 2025-04-13 NOTE — PLAN OF CARE
Assumed care 1900, 4/12, NOC. No neuro changes, briefly confused overnight, decadron per order, denies pain, slept overnight, IVF, needs met

## 2025-04-13 NOTE — PROGRESS NOTES
Oncology Progress note       Bladder cancer on treatment with enfortumab  Now noted to have a brain mass after she presented with MS changes.      HPI:  States she is feeling ok  No new complaints  Denying pain     Past Medical History[1]  Social Hx on file[2]  Vitals:    04/13/25 0330   BP: 141/69   Pulse: 61   Resp: 16   Temp: 97.6 °F (36.4 °C)     GEN: alert; nad.   HEENT: normocephalic  HEART: regular rate  LUNGS: normal breathing effort   eXT: no edema    A/P:  75 year old with metastatic bladder cancer currently on enfortumab, here with new brain mass, cerebral edema    Brain mass  -presented with confusion  -MRI shows left frontal lobe mass, measuring 5.7 cm with vasogenic edema. Has been started on dex  Neurosurgery on case  CT c/a/p does not show any acute findings    2. Metastatic bladder cancer  -on treatment with enfortumab  -? Brain mets vs new cns primary  -await neurosurg workup    PLAN:  -continue Dex for now  -await further recommendations from neurosurgery  -will need radiation oncology--can be arranged as outpatient, pending clinical course  -outpatient follow up with Dr. Woody after discharge  -discussed with patient          [1]   Past Medical History:   Cancer (HCC)    CKD (chronic kidney disease), stage III (HCC)    HYPERLIPIDEMIA    Hypertension    OSTEOPENIA    Personal history of bladder cancer    S/P ileoconduit   [2]   Social History  Socioeconomic History    Marital status:     Number of children: 0   Occupational History    Occupation: Semi retired    Tobacco Use    Smoking status: Never    Smokeless tobacco: Never   Vaping Use    Vaping status: Never Used   Substance and Sexual Activity    Alcohol use: Not Currently     Alcohol/week: 2.0 standard drinks of alcohol     Types: 2 Standard drinks or equivalent per week    Drug use: No   Other Topics Concern    Seat Belt Yes

## 2025-04-13 NOTE — PLAN OF CARE
Assumed care at 0730  Orientated x4, NSR, RA  Denies pain     Nq4; no new neuro deficits   BP within parameters   IV steroids   UP to chair   Needs met     Plan for further discussion with neurosurgery and oncology

## 2025-04-13 NOTE — PROGRESS NOTES
[facility] Hospitalist Progress Note     CC: Hospital Follow up    PCP: Rajni Tiwari MD       Subjective:     No changes overnight, patient doing well. Confusion consistent from yesterday,  at bedside and in agreement. Awaiting further plans from neurosurgery.     OBJECTIVE:    Blood pressure 117/57, pulse 62, temperature 97.9 °F (36.6 °C), temperature source Oral, resp. rate 18, height 5' 5\" (1.651 m), weight 146 lb (66.2 kg), SpO2 94%, not currently breastfeeding.    Temp:  [97.6 °F (36.4 °C)-98.3 °F (36.8 °C)] 97.9 °F (36.6 °C)  Pulse:  [58-84] 62  Resp:  [16-20] 18  BP: (115-141)/(57-69) 117/57  SpO2:  [94 %-98 %] 94 %      Intake/Output:    Intake/Output Summary (Last 24 hours) at 4/13/2025 1413  Last data filed at 4/13/2025 1000  Gross per 24 hour   Intake 380 ml   Output 1250 ml   Net -870 ml       Last 3 Weights   04/10/25 2323 146 lb (66.2 kg)   04/10/25 1922 154 lb (69.9 kg)   10/22/22 1100 123 lb 7.3 oz (56 kg)   10/22/22 1153 123 lb 7.3 oz (56 kg)       Exam   Gen: Alert, no acute distress  Heent: Normocephalic, atraumatic, neck supple, EOMI, PERRLA  Pulm: Lungs CTAB, normal respiratory effort  CV:  Regular rate and rhythm, no murmurs/rubs/gallops  Abd: Soft, nontender, nondistended, bowel sounds present  Extremities: No peripheral edema, no clubbing, pulses intact   Skin: No rashes or lesions  Neuro: AOx2, no focal neurologic deficits, CN II-XII grossly intact  Psych: Pleasantly confused      Data Review:       Labs:     Recent Labs   Lab 04/10/25  2025 04/11/25  0533 04/12/25  0526 04/13/25  0533   RBC 3.93 3.71* 3.64* 3.46*   HGB 13.1 12.4 11.9* 11.4*   HCT 39.2 36.3 36.9 33.4*   MCV 99.7 97.8 101.4* 96.5   MCH 33.3 33.4 32.7 32.9   MCHC 33.4 34.2 32.2 34.1   RDW 13.0 13.1 13.1 13.2   NEPRELIM 8.44* 7.98*  --   --    WBC 11.8* 9.2 12.3* 13.5*   .0 180.0 183.0 195.0         Recent Labs   Lab 04/11/25  0555 04/12/25  0526 04/13/25  0533   * 148* 142*   BUN 18 19 26*    CREATSERUM 1.14* 1.28* 1.21*   EGFRCR 50* 43* 46*   CA 9.5 9.4 9.0    141 141   K 5.2*  5.2* 5.5* 4.2    109 109   CO2 16.0* 24.0 23.0       Recent Labs   Lab 04/10/25  2024 04/12/25  0526 04/13/25  0533   ALT 17  --   --    AST 27  --   --    ALB 4.5 3.9 3.7         Imaging:  CT CHEST+ABDOMEN+PELVIS(ALL CNTRST ONLY)(AZM=50136/[id number])  Result Date: 4/11/2025  CONCLUSION:   No acute process or evidence of malignancy within the chest, abdomen, or pelvis.    LOCATION:  [facility]    Dictated by (CST): Mir Gruber MD on 4/11/2025 at 1:20 PM     Finalized by (CST): Mir Gruber MD on 4/11/2025 at 1:34 PM       MRI BRAIN (W+WO) (CPT=70553)  Result Date: 4/10/2025  CONCLUSION:  1. Enhancing mass in the left frontal lobe with marked adjacent vasogenic edema as described above is concerning for primary CNS neoplasm   LOCATION:  [facility]    Dictated by (CST): Eduardo Alejandra MD on 4/10/2025 at 11:14 PM     Finalized by (CST): Eduardo Alejandra MD on 4/10/2025 at 11:17 PM       XR CHEST AP PORTABLE  (CPT=71045)  Result Date: 4/10/2025  CONCLUSION:  New right chest port.  Elevation of the right hemidiaphragm with small right effusion is noted.   LOCATION:  [facility]      Dictated by (CST): Eduardo Alejandra MD on 4/10/2025 at 8:42 PM     Finalized by (CST): Eduardo Alejandra MD on 4/10/2025 at 8:43 PM       CT BRAIN OR HEAD (CPT=70450)  Result Date: 4/10/2025  CONCLUSION:  Significant vasogenic edema in left frontal lobe likely due to an underlying mass lesion which could represent a primary glioma of the brain versus metastatic lesion.  Also within the differential would be infection.  Further evaluation using MRI brain with gadolinium is recommended.    LOCATION:  [facility]   Dictated by (CST): Ivan Christian MD on 4/10/2025 at 8:27 PM     Finalized by (CST): Ivan Christian MD on 4/10/2025 at 8:30 PM           Meds:     Scheduled Medications[1]  Medication Infusions[2]  PRN Medications[3]       Assessment/Plan:     75 year old female with  PMH of stage IV bladder cancer with lung metastases s/p cystectomy with ileal conduit and wedge resection of TONNY nodules currently on Enfortumab, depression, hypertension, hyperlipidemia, osteopenia, CKD stage IIIb is presenting to the hospital for altered mental status.     Left frontal lobe mass with vasogenic edema  Encephalopathy 2/2 above  -Imminent threat to neurologic function given patient's altered mental status, could be primary CNS tumor versus metastases  -Discussed with neurosurgery, continue Decadron 4 mg every 6 hours  -CT chest/abdomen/pelvis reviewed, unremarkable for any other metastases  -Discussed with  oncology, will need radiation oncology outpatient  -Plan for possible intervention with neurosurgery in the next few days     Stage IV bladder cancer  Lung metastases  S/p cystectomy with ileal conduit  History of wedge resection of TONNY nodes  -Follows with Dr. Woody outpatient, received one dose enfortumab earlier this month  -Oncology consulted inpatient    Pyuria  -Urostomy likely colonized, no sign/concerns of an acute infection at this time  -Discussed with ID, no need for antibiotics    Hyperkalemia  -Potassium reviewed this morning, 4.2  -S/p Lokelma yesterday  -Will continue to monitor potassium    CKD stage IIIb  -Creatinine 1.21, near baseline  -Serial RFP's    Hyperlipidemia  -Continue home statin     Depression  -Continue home bupropion, buspirone     Hypertension  -Continue home beta-blocker    Dispo: Inpatient, pending neurosurgery recommendations.    Outpatient records reviewed. Questions/concerns were discussed with patient and/or family by bedside.  Discussed with neurosurgery, oncology.    Sky Gruber DO  [facility]  Hospitalist  Contact via [employer]      Supplementary Documentation:   DVT Mechanical Prophylaxis:   SCDs,    DVT Pharmacologic Prophylaxis   Medication    heparin (Porcine) 5000 UNIT/ML injection 5,000 Units                Code Status: Full  Code  Barbosa: No urinary catheter in place  Barbosa Duration (in days):   Central line:    LYNNETTE:         **Certification      PHYSICIAN Certification of Need for Inpatient Hospitalization - Initial Certification    Patient will require inpatient services that will reasonably be expected to span two midnight's based on the clinical documentation in H+P.   Based on patients current state of illness, I anticipate that, after discharge, patient will require TBD.             [1]    sodium zirconium cyclosilicate  10 g Oral Q8H    dexamethasone  4 mg Intravenous Q6H    atorvastatin  20 mg Oral Nightly    heparin  5,000 Units Subcutaneous 2 times per day    buPROPion SR  150 mg Oral Daily    busPIRone  5 mg Oral BID    metoprolol tartrate  25 mg Oral BID   [2]    lactated ringers 100 mL/hr at 04/11/25 0009   [3]   acetaminophen

## 2025-04-14 LAB
ALBUMIN SERPL-MCNC: 3.8 G/DL (ref 3.2–4.8)
ANION GAP SERPL CALC-SCNC: 9 MMOL/L (ref 0–18)
BUN BLD-MCNC: 24 MG/DL (ref 9–23)
CALCIUM BLD-MCNC: 9.2 MG/DL (ref 8.7–10.6)
CHLORIDE SERPL-SCNC: 109 MMOL/L (ref 98–112)
CO2 SERPL-SCNC: 24 MMOL/L (ref 21–32)
CREAT BLD-MCNC: 1.17 MG/DL (ref 0.55–1.02)
EGFRCR SERPLBLD CKD-EPI 2021: 48 ML/MIN/1.73M2 (ref 60–?)
ERYTHROCYTE [DISTWIDTH] IN BLOOD BY AUTOMATED COUNT: 13 %
GLUCOSE BLD-MCNC: 135 MG/DL (ref 70–99)
HCT VFR BLD AUTO: 35.6 % (ref 35–48)
HGB BLD-MCNC: 12.1 G/DL (ref 12–16)
MAGNESIUM SERPL-MCNC: 2.2 MG/DL (ref 1.6–2.6)
MCH RBC QN AUTO: 33.2 PG (ref 26–34)
MCHC RBC AUTO-ENTMCNC: 34 G/DL (ref 31–37)
MCV RBC AUTO: 97.5 FL (ref 80–100)
OSMOLALITY SERPL CALC.SUM OF ELEC: 300 MOSM/KG (ref 275–295)
PHOSPHATE SERPL-MCNC: 3.4 MG/DL (ref 2.4–5.1)
PLATELET # BLD AUTO: 198 10(3)UL (ref 150–450)
POTASSIUM SERPL-SCNC: 4.7 MMOL/L (ref 3.5–5.1)
RBC # BLD AUTO: 3.65 X10(6)UL (ref 3.8–5.3)
SODIUM SERPL-SCNC: 142 MMOL/L (ref 136–145)
WBC # BLD AUTO: 12.1 X10(3) UL (ref 4–11)

## 2025-04-14 PROCEDURE — 99232 SBSQ HOSP IP/OBS MODERATE 35: CPT | Performed by: NEUROLOGICAL SURGERY

## 2025-04-14 NOTE — PLAN OF CARE
Assumed care at  0730  A&Ox2-3, VSS, up with 1 and walker   No complaints of pain   No change in neuro status   Tolerating diet   R urostomy intact   Patient and family updated on POC   All questions answered   Call light within reach

## 2025-04-14 NOTE — PROGRESS NOTES
[facility]  Neurosurgery Progress Note    Sujey Medina Patient Status:  Inpatient    3/6/1950 MRN [id number]   Location [facility] 7NE-A Attending Xavier Tang, DO   Hosp Day # 4 PCP Rajni Tiwari MD     Chief Complaint:  Left frontal Brain mass    Subjective:  No events overnight.    Objective/Physical Exam:    Vital Signs:  Blood pressure 151/69, pulse 52, temperature 97.9 °F (36.6 °C), temperature source Oral, resp. rate 20, height 65\", weight 146 lb (66.2 kg), SpO2 97%, not currently breastfeeding.  Respiratory:  nonlabored  CV:  well perfused  General: NAD, Speech Fluent, Mood Appropriate  Neurologic:   A&Ox2  PERRL, EOMi, FS, TM  Full strength x 4, no drift      Labs:  Recent Labs   Lab 04/10/25  2025 04/11/25  0533 04/12/25  0525  0533 25  0613   RBC 3.93 3.71* 3.64* 3.46* 3.65*   HGB 13.1 12.4 11.9* 11.4* 12.1   HCT 39.2 36.3 36.9 33.4* 35.6   MCV 99.7 97.8 101.4* 96.5 97.5   MCH 33.3 33.4 32.7 32.9 33.2   MCHC 33.4 34.2 32.2 34.1 34.0   RDW 13.0 13.1 13.1 13.2 13.0   NEPRELIM 8.44* 7.98*  --   --   --    WBC 11.8* 9.2 12.3* 13.5* 12.1*   .0 180.0 183.0 195.0 198.0       Recent Labs   Lab 25  0525  0533 25  0613   * 142* 135*   BUN 19 26* 24*   CREATSERUM 1.28* 1.21* 1.17*   EGFRCR 43* 46* 48*   CA 9.4 9.0 9.2    141 142   K 5.5* 4.2 4.7    109 109   CO2 24.0 23.0 24.0       Imaging:  CT CHEST+ABDOMEN+PELVIS(ALL CNTRST ONLY)(VYK=61848/[id number])  Result Date: 2025  CONCLUSION:   No acute process or evidence of malignancy within the chest, abdomen, or pelvis.    LOCATION:  [facility]    Dictated by (CST): Mir Gruber MD on 2025 at 1:20 PM     Finalized by (CST): Mir Gruber MD on 2025 at 1:34 PM       MRI BRAIN (W+WO) (CPT=70553)  Result Date: 4/10/2025  CONCLUSION:  1. Enhancing mass in the left frontal lobe with marked adjacent vasogenic edema as described above is concerning for primary CNS neoplasm   LOCATION:   [facility]    Dictated by (CST): Eduardo Alejandra MD on 4/10/2025 at 11:14 PM     Finalized by (CST): Eduardo Alejandra MD on 4/10/2025 at 11:17 PM       XR CHEST AP PORTABLE  (CPT=71045)  Result Date: 4/10/2025  CONCLUSION:  New right chest port.  Elevation of the right hemidiaphragm with small right effusion is noted.   LOCATION:  [facility]      Dictated by (CST): Eduardo Alejandra MD on 4/10/2025 at 8:42 PM     Finalized by (CST): Eduardo Alejandra MD on 4/10/2025 at 8:43 PM       CT BRAIN OR HEAD (CPT=70450)  Result Date: 4/10/2025  CONCLUSION:  Significant vasogenic edema in left frontal lobe likely due to an underlying mass lesion which could represent a primary glioma of the brain versus metastatic lesion.  Also within the differential would be infection.  Further evaluation using MRI brain with gadolinium is recommended.    LOCATION:  [facility]   Dictated by (CST): Ivan Christian MD on 4/10/2025 at 8:27 PM     Finalized by (CST): Ivan Christian MD on 4/10/2025 at 8:30 PM            Assessment:  76 yo female with PMH of bladder CA now here with left frontal brain mass    Dr Daigle discussed the risks, benefits, alternatives to brain biopsy/ resection. What to expect post op.       Plan:  Medical management per hospitalist  CT chest abd pelvis negative for mets  On decadron 4mg q6  DVT prophylaxis SCD  Plan for surgery for tumor resection on Wednesday    Is this a shared or split note between Advanced Practice Provider and Physician? Yes       ADALBERTO Gray  [facility]  4/14/2025, 9:52 AM  [place] [id number]   Patient seen and examined with nurse practitioner  Case discussed  Had a nice long conversation with patient and   Discussed treatment options  I spoken with Dr. Hastings with heme-onc  He think she is a reasonable surgical candidate  Along discussion of options  They wish to proceed with surgery  Risk and benefits of craniotomy were discussed in detail which include but limited to bleeding, infection,  temporary or permanent neurologic deficit, CSF leak, not helping her symptoms, need for future surgery, possible death  They wish to proceed  We will plan for surgery on Wednesday  The OR was contacted  Will get her type and cross  N.p.o. past midnight on Tuesday  Reviewed films in detail  All questions were answered  Patient has been appreciative

## 2025-04-14 NOTE — PROGRESS NOTES
Oncology Progress note       Bladder cancer on treatment with enfortumab  Now noted to have a brain mass after she presented with MS changes.      HPI:  States she is feeling ok this am  Confusion o/n noted per RN notes  Still on decadron    CT c/a/p with no clear progressive disease noted.     No new complaints  Denying pain     Past Medical History[1]  Social Hx on file[2]  Vitals:    04/14/25 0720   BP: 151/69   Pulse: 52   Resp: 20   Temp: 97.9 °F (36.6 °C)     GEN: alert; nad.   HEENT: normocephalic  HEART: regular rate  LUNGS: normal breathing effort   eXT: no edema    Current Hospital Medications[3]       A/P:  75 year old with metastatic bladder cancer currently on enfortumab, here with new brain mass, cerebral edema    Brain mass  -presented with confusion  -MRI shows left frontal lobe mass, measuring 5.7 cm with vasogenic edema. Has been started on dex  Neurosurgery on case  CT c/a/p does not show any acute findings    2. Metastatic bladder cancer  -on treatment with enfortumab  -? Brain mets vs new cns primary  -await neurosurg workup    PLAN:  -continue Dex for now 4 mg q 6 hours  -await further recommendations from neurosurgery.   We can review case with neurosurgical team at any time.   -will need radiation oncology--can be arranged as outpatient, pending clinical course  -outpatient follow up with Dr. Woody after discharge  -discussed with patient and RN today    Fareed Hastings  [phone]       [1]   Past Medical History:   Cancer (HCC)    CKD (chronic kidney disease), stage III (HCC)    HYPERLIPIDEMIA    Hypertension    OSTEOPENIA    Personal history of bladder cancer    S/P ileoconduit   [2]   Social History  Socioeconomic History    Marital status:     Number of children: 0   Occupational History    Occupation: Semi retired    Tobacco Use    Smoking status: Never    Smokeless tobacco: Never   Vaping Use    Vaping status: Never Used   Substance and Sexual Activity    Alcohol use:  Not Currently     Alcohol/week: 2.0 standard drinks of alcohol     Types: 2 Standard drinks or equivalent per week    Drug use: No   Other Topics Concern    Seat Belt Yes   [3]   sodium zirconium cyclosilicate    dexamethasone    atorvastatin    heparin    acetaminophen    buPROPion SR    busPIRone    metoprolol tartrate

## 2025-04-14 NOTE — PLAN OF CARE
Assumed care 1900, 4/13, NOC. No neuro changes, briefly confused overnight, decadron per order, denies pain, slept overnight, IVF, needs met

## 2025-04-14 NOTE — PROGRESS NOTES
[facility]   part of [facility] Hospitalist Progress Note     Sujey Medina Patient Status:  Inpatient    3/6/1950 MRN [id number]   Location [facility] 7NE-A Attending Xavier Tang, DO   Hosp Day # 4 PCP Rajni Tiwari MD     Assessment & Plan:    75 year old female with PMH of stage IV bladder cancer with lung metastases s/p cystectomy with ileal conduit and wedge resection of TONNY nodules currently on Enfortumab, HTN, HLD, CKD3b, MDD presenting to the hospital for AMS     Left frontal lobe mass with vasogenic edema  Acute Metabolic Encephalopathy  -Imminent threat to neurologic function given patient's altered mental status, could be primary CNS tumor versus metastases  -CT C/A/P: negative for other metastases  RCRI: 0, 3.9% risk of MACE  Frazier: 0.4% risk of MACE  DASI: > 34, low shruthi-operative risk  Patient needs no additional testing prior to proceeding w/ surgical intervention, if emergency surgery is needed can proceed.  Plan:  -Discussed with neurosurgery, continue Decadron 4 mg every 6 hours, planning for surgical resection on Wednesday, NPO at midnight tomorrow night  -Discussed with oncology, will need radiation oncology outpatient     Stage IV bladder cancer s/p cystectomy with ileal conduit  Hx of Lung metastases s/p wedge resection of TONNY nodules  Plan:  -Oncology consulted inpatient, recommending eventual radiation as outpatient     Pyuria  UTI/Acute Cystitis - ruled out  -Urostomy likely colonized, no sign/concerns of an acute infection at this time  -Discussed with ID, no need for antibiotics    CKD stage IIIb  Hyperkalemia - resolved  -Serial RFP's     Hyperlipidemia  -Continue home statin     Depression  -Continue home bupropion, buspirone     Hypertension  -Continue home beta-blocker    DVT Ppx: SQH    Subjective:     Patient seen and examined this afternoon at bedside. No acute complaints. Eating lunch, had a very good discussion w/ NSGY team today  regarding plans.    Vital signs:  Temp:  [97.9 °F (36.6 °C)-98.4 °F (36.9 °C)] 97.9 °F (36.6 °C)  Pulse:  [48-62] 52  Resp:  [18-20] 20  BP: (117-151)/(56-69) 151/69  SpO2:  [94 %-99 %] 97 %    Physical Exam:    General: No acute distress.   Respiratory: Clear to auscultation bilaterally. No wheezes.  Cardiovascular: S1, S2. Regular rate and rhythm  Abdomen: Soft, nontender, nondistended.  Positive bowel sounds. No rebound or guarding.  Extremities: No edema.  Neuro:  Grossly non focal, no motor deficits.      Diagnostic Data:    Pertinent Labs and Imaging independently reviewed  Comments:  Cr stable  WBC improving  HgB stable    Xavier Tang D.O.  [facility]     Supplementary Documentation:   DVT Mechanical Prophylaxis:   SCDs,    DVT Pharmacologic Prophylaxis   Medication    heparin (Porcine) 5000 UNIT/ML injection 5,000 Units                Code Status: Full Code  Barbosa: No urinary catheter in place  Barbosa Duration (in days):   Central line:    LYNNETTE:

## 2025-04-15 ENCOUNTER — ANESTHESIA EVENT (OUTPATIENT)
Dept: SURGERY | Facility: HOSPITAL | Age: 75
End: 2025-04-15
Payer: MEDICARE

## 2025-04-15 LAB
ANION GAP SERPL CALC-SCNC: 9 MMOL/L (ref 0–18)
ANTIBODY SCREEN: NEGATIVE
APTT PPP: 31.3 SECONDS (ref 23–36)
BUN BLD-MCNC: 27 MG/DL (ref 9–23)
CALCIUM BLD-MCNC: 9.1 MG/DL (ref 8.7–10.6)
CHLORIDE SERPL-SCNC: 109 MMOL/L (ref 98–112)
CO2 SERPL-SCNC: 23 MMOL/L (ref 21–32)
CREAT BLD-MCNC: 1.21 MG/DL (ref 0.55–1.02)
EGFRCR SERPLBLD CKD-EPI 2021: 46 ML/MIN/1.73M2 (ref 60–?)
ERYTHROCYTE [DISTWIDTH] IN BLOOD BY AUTOMATED COUNT: 13.1 %
GLUCOSE BLD-MCNC: 129 MG/DL (ref 70–99)
HCT VFR BLD AUTO: 35.1 % (ref 35–48)
HGB BLD-MCNC: 12.2 G/DL (ref 12–16)
INR BLD: 0.96 (ref 0.8–1.2)
MCH RBC QN AUTO: 33.3 PG (ref 26–34)
MCHC RBC AUTO-ENTMCNC: 34.8 G/DL (ref 31–37)
MCV RBC AUTO: 95.9 FL (ref 80–100)
OSMOLALITY SERPL CALC.SUM OF ELEC: 299 MOSM/KG (ref 275–295)
PLATELET # BLD AUTO: 195 10(3)UL (ref 150–450)
POTASSIUM SERPL-SCNC: 4.4 MMOL/L (ref 3.5–5.1)
PROTHROMBIN TIME: 12.8 SECONDS (ref 11.6–14.8)
RBC # BLD AUTO: 3.66 X10(6)UL (ref 3.8–5.3)
RH BLOOD TYPE: POSITIVE
RH BLOOD TYPE: POSITIVE
SODIUM SERPL-SCNC: 141 MMOL/L (ref 136–145)
WBC # BLD AUTO: 13 X10(3) UL (ref 4–11)

## 2025-04-15 PROCEDURE — 99231 SBSQ HOSP IP/OBS SF/LOW 25: CPT | Performed by: NEUROLOGICAL SURGERY

## 2025-04-15 NOTE — PLAN OF CARE
Assumed care at 0730  Patient alert, oriented x 2-3  VSS, RA throughout shift, NSR on tele  No reports of pain  Up x 1 and a walker  Right urostomy intact  Consent obtained  Tolerating diet well  Call light within reach    Plan for surgery tomorrow  Patient and family updated on plan of care

## 2025-04-15 NOTE — PROGRESS NOTES
[facility]   part of [facility] Hospitalist Progress Note     Sujey Medina Patient Status:  Inpatient    3/6/1950 MRN [id number]   Location [facility] 7NE-A Attending Xavier Tang, DO   Hosp Day # 5 PCP Rajni Tiwari MD     Assessment & Plan:    75 year old female with PMH of stage IV bladder cancer with lung metastases s/p cystectomy with ileal conduit and wedge resection of TONNY nodules currently on Enfortumab, HTN, HLD, CKD3b, MDD presenting to the hospital for AMS     Left frontal lobe mass with vasogenic edema  Acute Metabolic Encephalopathy  -Imminent threat to neurologic function given patient's altered mental status, could be primary CNS tumor versus metastases  -CT C/A/P: negative for other metastases  RCRI: 0, 3.9% risk of MACE  Frazier: 0.4% risk of MACE  DASI: > 34, low shruthi-operative risk  Patient needs no additional testing prior to proceeding w/ surgical intervention, if emergency surgery is needed can proceed.  Plan:  -Discussed with neurosurgery, continue Decadron 4 mg every 6 hours, planning for surgical resection tomorrow, NPO at midnight  -Discussed with oncology, will need radiation oncology outpatient     Stage IV bladder cancer s/p cystectomy with ileal conduit  Hx of Lung metastases s/p wedge resection of TONNY nodules  Plan:  -Oncology consulted inpatient, recommending eventual radiation as outpatient     Pyuria  UTI/Acute Cystitis - ruled out  -Urostomy likely colonized, no sign/concerns of an acute infection at this time  -Discussed with ID, no need for antibiotics    CKD stage IIIb  Hyperkalemia - resolved  -Serial RFP's     Hyperlipidemia  -Continue home statin     Depression  -Continue home bupropion, buspirone     Hypertension  -Continue home beta-blocker    DVT Ppx: SQH    Subjective:     Patient seen and examined this morning at bedside. No acute complaints.     Vital signs:  Temp:  [97.6 °F (36.4 °C)-98.4 °F (36.9 °C)] 97.6 °F (36.4 °C)  Pulse:   [46-63] 51  Resp:  [14-18] 14  BP: (109-152)/(56-78) 152/78  SpO2:  [95 %-97 %] 97 %    Physical Exam:    General: No acute distress.   Respiratory: Clear to auscultation bilaterally. No wheezes.  Cardiovascular: S1, S2. Regular rate and rhythm  Abdomen: Soft, nontender, nondistended.  Positive bowel sounds. No rebound or guarding.  Extremities: No edema.  Neuro:  Grossly non focal, no motor deficits.      Diagnostic Data:    Pertinent Labs and Imaging independently reviewed  Comments:  Cr stable  WBC up to 13.0  HgB stable    Xavier LIBRA Tang  [facility] Hospitalist     Supplementary Documentation:   DVT Mechanical Prophylaxis:   SCDs,    DVT Pharmacologic Prophylaxis   Medication    heparin (Porcine) 5000 UNIT/ML injection 5,000 Units                Code Status: Full Code  Barbosa: No urinary catheter in place  Barbosa Duration (in days):   Central line:    LYNNETTE:

## 2025-04-15 NOTE — SLP NOTE
SPEECH/LANGUAGE/COGNITIVE EVALUATION - INPATIENT    Admission Date: 4/10/2025  Evaluation Date: 04/15/25    Reason for Referral: Stroke protocol    ASSESSMENT & PLAN   ASSESSMENT & IMPRESSION  ASSESSMENT:    Patient is a 75 year old female admitted with aphasia and found to have frontal brain mass. PMHx of bladder cancer.  Patient's chief complaint of difficulty talking. Patient presented with a moderate nonfluent expressive aphasia and mild receptive aphasia. No evidence of a motor speech impairment.  Patient aware of deficits and remains pleasant throughout the exam. She has significant word finding deficits with incomplete sentences. See below objective measures for details.  Prior to patient's hospital admission she was independent in ADLs and lives with  .   Impairment in expressive and receptive language impacts the ability to complete premorbid tasks and ADL's.  Patient would benefit from ongoing skilled ST services while remaining in-house and upon discharge to address aforementioned deficits.   Provided patient and  with verbal education regarding results of this evaluation along with treatment plan as well as role of SLP in which understanding was verbalized and was in agreement with this information.      Recommend to follow up on 4/17 following neurosurgery for tumor resection.   OBJECTIVE      Patient administered the Bedside Western Aphasia Battery- Revised (WAB-R). The WAB-R is an individually administered test designed to evaluate a patient's language function following a stroke, dementia, or there acquired neurological disorder.  The following scores were obtained:      Spontaneous Speech Content 5/10   Spontaneous Speech Fluency 6/10   Auditory Verbal Comprehension Yes/No questions 7/10   Auditory Verbal Comprehension Sequential Commands 8/10   Oral Expression Repetition 7/10   Oral Expression Object Naming 7/10      BEDSIDE APHASIA SCORE:  66.67 (a score of greater or equal to 93.8  is considered to be within normal limits on the comprehensive Western Aphasia Battery-Revised. This score can be used as a means of comparison on the Bedside Western Aphasia Battery-Revised).     Writin/10     BEDSIDE LANGUAGE SCORE: could not be obtained at this time due to reading and writing subtests deferred.  Level of assistance/compensatory: The patient benefitted from phonemic cues, having patient describe action and sentence completion.        Assessment(s) Administered: WAB Bedside Score     WAB Bedside Score:  (66.67)          Aphasic Depression Rating Scale Administered: No  Deficits Identified: Auditory comprehension, Verbal expression, Written expression    Patient Experiencing Pain: No      GOALS  Goal #1 The patient will follow 3 step  step directions with minimal cues with 85 % accuracy within 3session(s).  In Progress   Goal #2 The patient will answer Abstract yes/no questions with 95 % accuracy within 3  session(s).    In Progress   Goal #3 The patient will complete Confrontation naming tasks with phonemic cues with 95 % accuracy within 3 session(s).   In Progress   Goal #4 The patient will complete Responsve naming tasks with phonemic cues with 95 % accuracy within 3 session(s).     In Progress     Prior Living Situation: Home with spouse  Prior Level of Function: Independent      Imaging Results:   Brain Imaging:  CONCLUSION:    1. Enhancing mass in the left frontal lobe with marked adjacent vasogenic edema as described above is concerning for primary CNS neoplasm      Patient/Family Goals: To go home    Interdisciplinary Communication: Disussed with other staff    Patient, family and/or caregiver has been informed and has taken part in this evaluation and plan of treatment and have been advised and agree on the findings and goals.      FOLLOW UP  Treatment Plan/Recommendations: Cognitive communication therapy (follow up post surgical intervention.)  Duration: 1 week  Follow Up Needed  (Documentation Required): Yes  SLP Follow-up Date: 04/17/25    Thank you for your referral.  If you have any questions please contact JUAN Smith

## 2025-04-15 NOTE — PROGRESS NOTES
[facility]  Neurosurgery Progress Note    Sujey Medina Patient Status:  Inpatient    3/6/1950 MRN [id number]   Location [facility] 7NE-A Attending Xavier Tang, DO   Hosp Day # 5 PCP Rajni Tiwari MD     Chief Complaint:  Brain mass    Subjective:  No events overnight. Plan for tumor resection tomorrow. Cleared by medicine for surgery.    Objective/Physical Exam:    Vital Signs:  Blood pressure 152/78, pulse 51, temperature 97.6 °F (36.4 °C), temperature source Oral, resp. rate 14, height 65\", weight 146 lb (66.2 kg), SpO2 97%, not currently breastfeeding.  Respiratory:  nonlabored  CV:  well perfused  General: NAD, Speech Fluent, Mood Appropriate  Neurologic:   A&Ox2  PERRL, EOMi, FS, TM  Full strength x 4, no drift    Labs:  Recent Labs   Lab 04/10/25  2025 04/11/25  0533 04/12/25  0526 04/13/25  0533 04/14/25  0613 04/15/25  0757   RBC 3.93 3.71*   < > 3.46* 3.65* 3.66*   HGB 13.1 12.4   < > 11.4* 12.1 12.2   HCT 39.2 36.3   < > 33.4* 35.6 35.1   MCV 99.7 97.8   < > 96.5 97.5 95.9   MCH 33.3 33.4   < > 32.9 33.2 33.3   MCHC 33.4 34.2   < > 34.1 34.0 34.8   RDW 13.0 13.1   < > 13.2 13.0 13.1   NEPRELIM 8.44* 7.98*  --   --   --   --    WBC 11.8* 9.2   < > 13.5* 12.1* 13.0*   .0 180.0   < > 195.0 198.0 195.0    < > = values in this interval not displayed.       Recent Labs   Lab 25  0613 04/15/25  0757   * 135* 129*   BUN 26* 24* 27*   CREATSERUM 1.21* 1.17* 1.21*   EGFRCR 46* 48* 46*   CA 9.0 9.2 9.1    142 141   K 4.2 4.7 4.4    109 109   CO2 23.0 24.0 23.0       Imaging:  CT CHEST+ABDOMEN+PELVIS(ALL CNTRST ONLY)(ZJW=00691/[id number])  Result Date: 2025  CONCLUSION:   No acute process or evidence of malignancy within the chest, abdomen, or pelvis.    LOCATION:  [facility]    Dictated by (CST): Mir Gruber MD on 2025 at 1:20 PM     Finalized by (CST): Mir Gruber MD on 2025 at 1:34 PM       MRI BRAIN (W+WO) (CPT=70553)  Result Date:  4/10/2025  CONCLUSION:  1. Enhancing mass in the left frontal lobe with marked adjacent vasogenic edema as described above is concerning for primary CNS neoplasm   LOCATION:  [facility]    Dictated by (CST): Eduardo Alejandra MD on 4/10/2025 at 11:14 PM     Finalized by (CST): Eduardo Alejandra MD on 4/10/2025 at 11:17 PM       XR CHEST AP PORTABLE  (CPT=71045)  Result Date: 4/10/2025  CONCLUSION:  New right chest port.  Elevation of the right hemidiaphragm with small right effusion is noted.   LOCATION:  [facility]      Dictated by (CST): Eduardo Alejandra MD on 4/10/2025 at 8:42 PM     Finalized by (CST): Eduardo Alejandra MD on 4/10/2025 at 8:43 PM       CT BRAIN OR HEAD (CPT=70450)  Result Date: 4/10/2025  CONCLUSION:  Significant vasogenic edema in left frontal lobe likely due to an underlying mass lesion which could represent a primary glioma of the brain versus metastatic lesion.  Also within the differential would be infection.  Further evaluation using MRI brain with gadolinium is recommended.    LOCATION:  [facility]   Dictated by (CST): Ivan Christian MD on 4/10/2025 at 8:27 PM     Finalized by (CST): Ivan Christian MD on 4/10/2025 at 8:30 PM            Assessment:  76 yo female with history of bladder cancer now with left frontal lobe mass    Plan:  Medical management per Hospitalist  NPO after midnight  Verify informed consent  CBC, BMP, PT/INR, PTT, type and screen today  Hold Heparin for DVT tonight and until unheld  DVT prophylaxis SCD    Is this a shared or split note between Advanced Practice Provider and Physician? Yes       ADALBERTO Gray  [facility] [place]  4/15/2025, 8:54 AM  Spectre [id number]     Patient seen examined with nurse practitioner  Case discussed  She is doing well  Plan for surgery tomorrow  No new questions  N.p.o. past midnight  Type and cross  Following

## 2025-04-15 NOTE — PLAN OF CARE
Assumed care 1900, 4/14, NOC. No neuro changes, briefly confused overnight, decadron per order, denies pain, slept overnight, IVF, needs met

## 2025-04-15 NOTE — PROGRESS NOTES
Oncology Progress note     Bladder cancer on treatment with enfortumab    Now noted to have a brain mass after she presented with MS changes.  Hx of Stage IV bladder cancer with known lung metastases--s/p cystectomy with ileal conduit and wedge resection of TONNY nodules.      HPI:  States she is feeling ok this am.  No confusion over night.  Awake and alert, in bed.  Still on decadron  Case reviewed with Dr. Garcia yesterday morning.    CT c/a/p with no clear progressive disease noted.     No new complaints  Denying pain     I have reviewed case with Rns  She remains on Heparin subcutaneous 5000 bid.    Past Medical History[1]  Social Hx on file[2]  Vitals:    04/15/25 0500   BP: 140/76   Pulse: (!) 46   Resp: 16   Temp: 98.3 °F (36.8 °C)     GEN: alert; nad.   HEENT: normocephalic  HEART: regular rate  LUNGS: normal breathing effort   eXT: no edema    Current Hospital Medications[3]       A/P:  75 year old with metastatic bladder cancer currently on enfortumab, here with new brain mass, cerebral edema    Brain mass  -presented with confusion  -MRI shows left frontal lobe mass, measuring 5.7 cm with vasogenic edema. Has been started on dex  Neurosurgery on case  CT c/a/p does not show any acute findings    2. Metastatic bladder cancer  -on treatment with enfortumab  -? Brain mets vs new cns primary    3. DVT prophylaxis--on heparin 5000 bid.   This will need to be held post operatively  She will need calf compressors and will reviewed with NRSG as to timing of resuming pharmacologic prophylaxis.      PLAN:  -continue Dex for now 4 mg q 6 hours for now.  -surgery planned on Wednesday.  I reviewed case with Dr. Daigle yesterday.  -will need radiation oncology--can be arranged as outpatient, pending clinical course  -outpatient follow up with Dr. Woody after discharge  -discussed with patient and RN today    Fareed Hastings  [phone]         [1]   Past Medical History:   Cancer (HCC)    CKD (chronic kidney  disease), stage III (HCC)    HYPERLIPIDEMIA    Hypertension    OSTEOPENIA    Personal history of bladder cancer    S/P ileoconduit   [2]   Social History  Socioeconomic History    Marital status:     Number of children: 0   Occupational History    Occupation: Semi retired    Tobacco Use    Smoking status: Never    Smokeless tobacco: Never   Vaping Use    Vaping status: Never Used   Substance and Sexual Activity    Alcohol use: Not Currently     Alcohol/week: 2.0 standard drinks of alcohol     Types: 2 Standard drinks or equivalent per week    Drug use: No   Other Topics Concern    Seat Belt Yes   [3]   dexamethasone    atorvastatin    heparin    acetaminophen    buPROPion SR    busPIRone    metoprolol tartrate

## 2025-04-15 NOTE — ANESTHESIA PREPROCEDURE EVALUATION
PRE-OP EVALUATION    Patient Name: Sujey Medina    Admit Diagnosis: Brain mass [G93.89]    Pre-op Diagnosis: INPATIENT    LEFT IMAGE GUIDED CRANIOTOMY FOR TUMOR    Anesthesia Procedure: LEFT IMAGE GUIDED CRANIOTOMY FOR TUMOR (Left)  NAVIGATION SYSTEM NEURO (Left)    Surgeons and Role:     * Candido Daigle MD - Primary    Pre-op vitals reviewed.  Temp: 98 °F (36.7 °C)  Pulse: 51  Resp: 16  BP: 152/78  SpO2: 96 %  Body mass index is 24.3 kg/m².    Current medications reviewed.  Hospital Medications:  Current Medications[1]    Outpatient Medications:   Prescriptions Prior to Admission[2]    Allergies: Patient has no known allergies.      Anesthesia Evaluation    Patient summary reviewed.    Anesthetic Complications  (-) history of anesthetic complications         GI/Hepatic/Renal  Comment: Metastatic bladder ca with brain met           (+) chronic renal disease and CRI                   Cardiovascular  Comment: ECG rhythm:   Normal sinus     ----------------------------------------------------------------------------     Conclusions:     1. Left ventricle: The cavity size was normal. Systolic function was normal.      The estimated ejection fraction was 55-60%.   2. Left atrium: The left atrial volume was normal.   3. Pulmonary arteries: Systolic pressure was within the normal range,      estimated to be 28mm Hg.         ECG reviewed.  Exercise tolerance: good     MET: >4      (+) hypertension                                     Endo/Other  Comment: hyperparathyroidism                                Pulmonary    Negative pulmonary ROS.                       Neuro/Psych      (+) depression  (+) anxiety                            Past Surgical History[3]  Social Hx on file[4]  History   Drug Use No     Available pre-op labs reviewed.  Lab Results   Component Value Date    WBC 13.0 (H) 04/15/2025    RBC 3.66 (L) 04/15/2025    HGB 12.2 04/15/2025    HCT 35.1 04/15/2025    MCV 95.9 04/15/2025    MCH 33.3 04/15/2025     MCHC 34.8 04/15/2025    RDW 13.1 04/15/2025    .0 04/15/2025     Lab Results   Component Value Date     04/15/2025    K 4.4 04/15/2025     04/15/2025    CO2 23.0 04/15/2025    BUN 27 (H) 04/15/2025    CREATSERUM 1.21 (H) 04/15/2025     (H) 04/15/2025    CA 9.1 04/15/2025     Lab Results   Component Value Date    INR 0.96 04/15/2025         Airway      Mallampati: II  Mouth opening: >3 FB  TM distance: 4 - 6 cm  Neck ROM: limited Cardiovascular      Rhythm: regular  Rate: normal     Dental             Pulmonary      Breath sounds clear to auscultation bilaterally.               Other findings        ASA: 3   Plan: general           Comment: Discussed with patient and  incl possible post op vent, roger, blood products, and extra vascular access    Sold isoflurane for a living  Plan/risks discussed with: patient and spouse              Present on Admission:  **None**             [1]    [] sodium zirconium cyclosilicate (Lokelma) oral packet 10 g  10 g Oral Q8H    dexamethasone (Decadron) 4 MG/ML injection 4 mg  4 mg Intravenous Q6H    atorvastatin (Lipitor) tab 20 mg  20 mg Oral Nightly    [COMPLETED] iopamidol 76% (ISOVUE-370) injection for power injector  100 mL Intravenous ONCE PRN    [COMPLETED] dexAMETHasone PF (Decadron) 10 MG/ML injection 4 mg  4 mg Intravenous Once    [] ondansetron (Zofran) 4 MG/2ML injection 4 mg  4 mg Intravenous Q4H PRN    heparin (Porcine) 5000 UNIT/ML injection 5,000 Units  5,000 Units Subcutaneous 2 times per day    acetaminophen (Tylenol Extra Strength) tab 500 mg  500 mg Oral Q4H PRN    buPROPion SR (WELLBUTRIN SR) 12 hr tab 150 mg  150 mg Oral Daily    [COMPLETED] gadoterate meglumine (Dotarem) 10 MMOL/20ML injection 20 mL  20 mL Intravenous ONCE PRN    [COMPLETED] potassium chloride 20 mEq/100mL IVPB premix 20 mEq  20 mEq Intravenous Once    busPIRone (Buspar) tab 5 mg  5 mg Oral BID    metoprolol tartrate (Lopressor) tab 25 mg  25 mg  Oral BID   [2]   Medications Prior to Admission   Medication Sig Dispense Refill Last Dose/Taking    busPIRone 5 MG Oral Tab Take 1 tablet (5 mg total) by mouth in the morning and 1 tablet (5 mg total) before bedtime.   4/9/2025    metoprolol tartrate 25 MG Oral Tab Take 1 tablet (25 mg total) by mouth 2 (two) times daily.   4/10/2025    buPROPion  MG Oral Tablet 12 Hr Take 1 tablet (150 mg total) by mouth in the morning. (Patient taking differently: Take 200 mg by mouth in the morning.) 30 tablet 0 4/10/2025    acidophilus-pectin Oral Cap Take 1 capsule by mouth in the morning.   4/10/2025    simvastatin 40 MG Oral Tab TAKE ONE TABLET (40 MG TOTAL) BY MOUTH NIGHTLY 90 tablet 3 4/9/2025    Multiple Vitamin (MULTI-VITAMIN DAILY OR) Take 1 tablet by mouth in the morning.   4/10/2025   [3]   Past Surgical History:  Procedure Laterality Date    Benign biopsy right  1988    Colon ca scrn not hi rsk ind N/A 10/28/2015    Procedure: COLONOSCOPY, POSSIBLE BIOPSY, POSSIBLE POLYPECTOMY 79363;  Surgeon: Lai Heard MD;  Location: [facility], [facility]    Colonoscopy  6/03    diverticulosis, hemorrhoids    Colonoscopy,diagnostic  10/28/15    diverticulosis    Cystoscopy,insert ureteral stent  3/12/10    Performed by BARNEY IZAGUIRRE at [facility], [facility]    Cystoscopy,insert ureteral stent  9/10/2010    Performed by BARNEY IZAGUIRRE at [facility]    Cystoscopy,remv calculus,simple  11/19/2010    Performed by BARNEY IZAGUIRRE at [facility]    Cystourethroscopy  3/2/2011    Performed by APRIL NO at [facility], [facility]    Cystourethroscopy,biopsy  11/19/2010    Performed by BARNEY IZAGUIRRE at [facility]    Cystourethroscopy,fulgur 2-5cm lesn  3/12/10    Performed by BARNEY IZAGUIRRE at [facility]    Cystourethroscopy,fulgur 2-5cm lesn  9/10/2010    Performed by BARNEY IZAGUIRRE at [facility], [facility]    Fluor gid ctr vad plmt rplcmt/vl   3/14/2011    Performed by TANESHA ALEXANDRE at [facility]    Insertion, tunneled centrally inserted venous access device, w/subq port; >5 years  3/14/2011    Performed by TANESHA ALEXANDRE at [facility]    Other surgical history  22 yrs ago    right breast bx    Other surgical history  2-23-09    cysto, Dr. Izaguirre    Other surgical history  6-3-10    cysto-poss [id number]-Ji. Merrick    Other surgical history  9/10/10    cysto, URS, RPG, TURBT, stent exchange-Dr. Izaguirre    Other surgical history  2/8/11    cysto- Dr. Izaguirre    Other surgical history      thoracic surgery    Other surgical history  11/20/13     VATS RUL    Patient documented not to have experienced any of the following events N/A 10/28/2015    Procedure: COLONOSCOPY, POSSIBLE BIOPSY, POSSIBLE POLYPECTOMY 71778;  Surgeon: Lai Heard MD;  Location: [facility]    Patient withough preoperative order for iv antibiotic surgical site infection prophylaxis. N/A 10/28/2015    Procedure: COLONOSCOPY, POSSIBLE BIOPSY, POSSIBLE POLYPECTOMY 49484;  Surgeon: Lai Heard MD;  Location: [facility]    Removal of tunneled cva device, with subq port or pump, central or peripheral insertion  12/17/2013    Procedure: PORT-A-CATH REMOVAL;  Surgeon: Bobby Palmer MD;  Location: [facility]    Skin surgery  06/13/2019    MOHS, BCC - nasal dorsum, Dr. PATRICIA Ghosh    Tubal ligation      X-ray retrograde pyelogram  3/12/10    Performed by BARNEY IZAGUIRRE at [facility]    X-ray retrograde pyelogram  9/10/2010    Performed by BARNEY IZAGUIRRE at [facility]   [4]   Social History  Socioeconomic History    Marital status:     Number of children: 0   Occupational History    Occupation: Semi retired    Tobacco Use    Smoking status: Never    Smokeless tobacco: Never   Vaping Use    Vaping status: Never Used   Substance and Sexual Activity    Alcohol use: Not  Currently     Alcohol/week: 2.0 standard drinks of alcohol     Types: 2 Standard drinks or equivalent per week    Drug use: No   Other Topics Concern    Seat Belt Yes

## 2025-04-16 ENCOUNTER — ANESTHESIA (OUTPATIENT)
Dept: SURGERY | Facility: HOSPITAL | Age: 75
End: 2025-04-16
Payer: MEDICARE

## 2025-04-16 LAB — MRSA DNA SPEC QL NAA+PROBE: NEGATIVE

## 2025-04-16 PROCEDURE — 99291 CRITICAL CARE FIRST HOUR: CPT | Performed by: INTERNAL MEDICINE

## 2025-04-16 PROCEDURE — 00R20KZ REPLACEMENT OF DURA MATER WITH NONAUTOLOGOUS TISSUE SUBSTITUTE, OPEN APPROACH: ICD-10-PCS | Performed by: NEUROLOGICAL SURGERY

## 2025-04-16 PROCEDURE — 8E09XBZ COMPUTER ASSISTED PROCEDURE OF HEAD AND NECK REGION: ICD-10-PCS | Performed by: NEUROLOGICAL SURGERY

## 2025-04-16 PROCEDURE — 00B00ZZ EXCISION OF BRAIN, OPEN APPROACH: ICD-10-PCS | Performed by: NEUROLOGICAL SURGERY

## 2025-04-16 DEVICE — VISTASEAL FIBRIN SEAL 4 ML: Type: IMPLANTABLE DEVICE | Site: HEAD | Status: FUNCTIONAL

## 2025-04-16 DEVICE — STRAIGHT PLATE, 12MM BAR, WITH TAB
Type: IMPLANTABLE DEVICE | Site: HEAD | Status: FUNCTIONAL
Brand: UNIVERSAL NEURO 3

## 2025-04-16 DEVICE — SCREW, AXS, SELF-DRILLING
Type: IMPLANTABLE DEVICE | Site: HEAD | Status: FUNCTIONAL
Brand: UNIVERSAL NEURO 3

## 2025-04-16 DEVICE — BURR HOLE COVER, WITH TAB, 10MM
Type: IMPLANTABLE DEVICE | Site: HEAD | Status: FUNCTIONAL
Brand: UNIVERSAL NEURO 3

## 2025-04-16 DEVICE — COLLAGEN DURAL REGENERATION MEMBRANE 2IN X 2IN (5CM X 5CM)
Type: IMPLANTABLE DEVICE | Site: HEAD | Status: FUNCTIONAL
Brand: DURAMATRIX-ONLAY PLUS

## 2025-04-16 RX ORDER — ACETAMINOPHEN 500 MG
1000 TABLET ORAL ONCE AS NEEDED
Status: DISCONTINUED | OUTPATIENT
Start: 2025-04-16 | End: 2025-04-16 | Stop reason: HOSPADM

## 2025-04-16 RX ORDER — HYDROMORPHONE HYDROCHLORIDE 1 MG/ML
0.4 INJECTION, SOLUTION INTRAMUSCULAR; INTRAVENOUS; SUBCUTANEOUS EVERY 5 MIN PRN
Status: DISCONTINUED | OUTPATIENT
Start: 2025-04-16 | End: 2025-04-16 | Stop reason: HOSPADM

## 2025-04-16 RX ORDER — HYDRALAZINE HYDROCHLORIDE 20 MG/ML
10 INJECTION INTRAMUSCULAR; INTRAVENOUS
Status: DISCONTINUED | OUTPATIENT
Start: 2025-04-16 | End: 2025-04-20

## 2025-04-16 RX ORDER — ROCURONIUM BROMIDE 10 MG/ML
INJECTION, SOLUTION INTRAVENOUS AS NEEDED
Status: DISCONTINUED | OUTPATIENT
Start: 2025-04-16 | End: 2025-04-16 | Stop reason: SURG

## 2025-04-16 RX ORDER — SODIUM CHLORIDE 9 MG/ML
INJECTION, SOLUTION INTRAVENOUS CONTINUOUS
Status: DISCONTINUED | OUTPATIENT
Start: 2025-04-16 | End: 2025-04-17

## 2025-04-16 RX ORDER — FAMOTIDINE 20 MG/1
20 TABLET, FILM COATED ORAL DAILY
Status: DISCONTINUED | OUTPATIENT
Start: 2025-04-16 | End: 2025-04-20

## 2025-04-16 RX ORDER — HYDROCODONE BITARTRATE AND ACETAMINOPHEN 5; 325 MG/1; MG/1
1 TABLET ORAL EVERY 6 HOURS PRN
Status: DISCONTINUED | OUTPATIENT
Start: 2025-04-16 | End: 2025-04-20

## 2025-04-16 RX ORDER — MANNITOL 20 G/100ML
INJECTION, SOLUTION INTRAVENOUS AS NEEDED
Status: DISCONTINUED | OUTPATIENT
Start: 2025-04-16 | End: 2025-04-16 | Stop reason: SURG

## 2025-04-16 RX ORDER — DEXAMETHASONE 4 MG/1
4 TABLET ORAL EVERY 8 HOURS SCHEDULED
Status: DISCONTINUED | OUTPATIENT
Start: 2025-04-19 | End: 2025-04-20

## 2025-04-16 RX ORDER — ONDANSETRON 2 MG/ML
INJECTION INTRAMUSCULAR; INTRAVENOUS AS NEEDED
Status: DISCONTINUED | OUTPATIENT
Start: 2025-04-16 | End: 2025-04-16 | Stop reason: SURG

## 2025-04-16 RX ORDER — HYDROMORPHONE HYDROCHLORIDE 1 MG/ML
0.6 INJECTION, SOLUTION INTRAMUSCULAR; INTRAVENOUS; SUBCUTANEOUS EVERY 5 MIN PRN
Status: DISCONTINUED | OUTPATIENT
Start: 2025-04-16 | End: 2025-04-16 | Stop reason: HOSPADM

## 2025-04-16 RX ORDER — DEXAMETHASONE 2 MG/1
2 TABLET ORAL EVERY 12 HOURS SCHEDULED
Status: DISCONTINUED | OUTPATIENT
Start: 2025-04-27 | End: 2025-04-20

## 2025-04-16 RX ORDER — EPHEDRINE SULFATE 50 MG/ML
INJECTION INTRAVENOUS AS NEEDED
Status: DISCONTINUED | OUTPATIENT
Start: 2025-04-16 | End: 2025-04-16 | Stop reason: SURG

## 2025-04-16 RX ORDER — ONDANSETRON 2 MG/ML
4 INJECTION INTRAMUSCULAR; INTRAVENOUS EVERY 6 HOURS PRN
Status: DISCONTINUED | OUTPATIENT
Start: 2025-04-16 | End: 2025-04-20

## 2025-04-16 RX ORDER — LIDOCAINE HYDROCHLORIDE 10 MG/ML
INJECTION, SOLUTION EPIDURAL; INFILTRATION; INTRACAUDAL; PERINEURAL AS NEEDED
Status: DISCONTINUED | OUTPATIENT
Start: 2025-04-16 | End: 2025-04-16 | Stop reason: SURG

## 2025-04-16 RX ORDER — DEXAMETHASONE 4 MG/1
4 TABLET ORAL EVERY 12 HOURS SCHEDULED
Status: DISCONTINUED | OUTPATIENT
Start: 2025-04-23 | End: 2025-04-20

## 2025-04-16 RX ORDER — DEXAMETHASONE SODIUM PHOSPHATE 4 MG/ML
4 VIAL (ML) INJECTION EVERY 6 HOURS
Status: COMPLETED | OUTPATIENT
Start: 2025-04-16 | End: 2025-04-17

## 2025-04-16 RX ORDER — FAMOTIDINE 10 MG/ML
20 INJECTION, SOLUTION INTRAVENOUS DAILY
Status: DISCONTINUED | OUTPATIENT
Start: 2025-04-16 | End: 2025-04-18

## 2025-04-16 RX ORDER — NALOXONE HYDROCHLORIDE 0.4 MG/ML
0.08 INJECTION, SOLUTION INTRAMUSCULAR; INTRAVENOUS; SUBCUTANEOUS AS NEEDED
Status: DISCONTINUED | OUTPATIENT
Start: 2025-04-16 | End: 2025-04-16 | Stop reason: HOSPADM

## 2025-04-16 RX ORDER — METOCLOPRAMIDE HYDROCHLORIDE 5 MG/ML
5 INJECTION INTRAMUSCULAR; INTRAVENOUS EVERY 8 HOURS PRN
Status: DISCONTINUED | OUTPATIENT
Start: 2025-04-16 | End: 2025-04-16 | Stop reason: HOSPADM

## 2025-04-16 RX ORDER — SODIUM CHLORIDE, SODIUM LACTATE, POTASSIUM CHLORIDE, CALCIUM CHLORIDE 600; 310; 30; 20 MG/100ML; MG/100ML; MG/100ML; MG/100ML
INJECTION, SOLUTION INTRAVENOUS CONTINUOUS PRN
Status: DISCONTINUED | OUTPATIENT
Start: 2025-04-16 | End: 2025-04-16 | Stop reason: SURG

## 2025-04-16 RX ORDER — SODIUM CHLORIDE, SODIUM LACTATE, POTASSIUM CHLORIDE, CALCIUM CHLORIDE 600; 310; 30; 20 MG/100ML; MG/100ML; MG/100ML; MG/100ML
INJECTION, SOLUTION INTRAVENOUS CONTINUOUS
Status: DISCONTINUED | OUTPATIENT
Start: 2025-04-16 | End: 2025-04-16 | Stop reason: HOSPADM

## 2025-04-16 RX ORDER — MORPHINE SULFATE 2 MG/ML
2 INJECTION, SOLUTION INTRAMUSCULAR; INTRAVENOUS EVERY 2 HOUR PRN
Status: DISCONTINUED | OUTPATIENT
Start: 2025-04-16 | End: 2025-04-20

## 2025-04-16 RX ORDER — HYDROCODONE BITARTRATE AND ACETAMINOPHEN 10; 325 MG/1; MG/1
1 TABLET ORAL ONCE AS NEEDED
Status: DISCONTINUED | OUTPATIENT
Start: 2025-04-16 | End: 2025-04-16 | Stop reason: HOSPADM

## 2025-04-16 RX ORDER — DEXAMETHASONE 4 MG/1
4 TABLET ORAL EVERY 6 HOURS SCHEDULED
Status: COMPLETED | OUTPATIENT
Start: 2025-04-18 | End: 2025-04-19

## 2025-04-16 RX ORDER — DEXAMETHASONE SODIUM PHOSPHATE 10 MG/ML
INJECTION, SOLUTION INTRAMUSCULAR; INTRAVENOUS AS NEEDED
Status: DISCONTINUED | OUTPATIENT
Start: 2025-04-16 | End: 2025-04-16 | Stop reason: SURG

## 2025-04-16 RX ORDER — ONDANSETRON 2 MG/ML
4 INJECTION INTRAMUSCULAR; INTRAVENOUS EVERY 6 HOURS PRN
Status: DISCONTINUED | OUTPATIENT
Start: 2025-04-16 | End: 2025-04-16 | Stop reason: HOSPADM

## 2025-04-16 RX ORDER — LABETALOL HYDROCHLORIDE 5 MG/ML
10 INJECTION, SOLUTION INTRAVENOUS EVERY 10 MIN PRN
Status: DISCONTINUED | OUTPATIENT
Start: 2025-04-16 | End: 2025-04-20

## 2025-04-16 RX ORDER — BUPIVACAINE HYDROCHLORIDE AND EPINEPHRINE 2.5; 5 MG/ML; UG/ML
INJECTION, SOLUTION EPIDURAL; INFILTRATION; INTRACAUDAL; PERINEURAL AS NEEDED
Status: DISCONTINUED | OUTPATIENT
Start: 2025-04-16 | End: 2025-04-16 | Stop reason: HOSPADM

## 2025-04-16 RX ORDER — HYDROCODONE BITARTRATE AND ACETAMINOPHEN 10; 325 MG/1; MG/1
2 TABLET ORAL ONCE AS NEEDED
Status: DISCONTINUED | OUTPATIENT
Start: 2025-04-16 | End: 2025-04-16 | Stop reason: HOSPADM

## 2025-04-16 RX ORDER — HYDROMORPHONE HYDROCHLORIDE 1 MG/ML
0.2 INJECTION, SOLUTION INTRAMUSCULAR; INTRAVENOUS; SUBCUTANEOUS EVERY 5 MIN PRN
Status: DISCONTINUED | OUTPATIENT
Start: 2025-04-16 | End: 2025-04-16 | Stop reason: HOSPADM

## 2025-04-16 RX ORDER — ACETAMINOPHEN 325 MG/1
650 TABLET ORAL EVERY 4 HOURS PRN
Status: DISCONTINUED | OUTPATIENT
Start: 2025-04-16 | End: 2025-04-20

## 2025-04-16 RX ADMIN — LIDOCAINE HYDROCHLORIDE 5 ML: 10 INJECTION, SOLUTION EPIDURAL; INFILTRATION; INTRACAUDAL; PERINEURAL at 08:05:00

## 2025-04-16 RX ADMIN — ROCURONIUM BROMIDE 20 MG: 10 INJECTION, SOLUTION INTRAVENOUS at 09:05:00

## 2025-04-16 RX ADMIN — ROCURONIUM BROMIDE 80 MG: 10 INJECTION, SOLUTION INTRAVENOUS at 08:05:00

## 2025-04-16 RX ADMIN — ROCURONIUM BROMIDE 10 MG: 10 INJECTION, SOLUTION INTRAVENOUS at 10:01:00

## 2025-04-16 RX ADMIN — DEXAMETHASONE SODIUM PHOSPHATE 10 MG: 10 INJECTION, SOLUTION INTRAMUSCULAR; INTRAVENOUS at 08:25:00

## 2025-04-16 RX ADMIN — EPHEDRINE SULFATE 5 MG: 50 INJECTION INTRAVENOUS at 09:35:00

## 2025-04-16 RX ADMIN — SODIUM CHLORIDE, SODIUM LACTATE, POTASSIUM CHLORIDE, CALCIUM CHLORIDE: 600; 310; 30; 20 INJECTION, SOLUTION INTRAVENOUS at 07:59:00

## 2025-04-16 RX ADMIN — EPHEDRINE SULFATE 5 MG: 50 INJECTION INTRAVENOUS at 09:23:00

## 2025-04-16 RX ADMIN — MANNITOL 165 ML: 20 INJECTION, SOLUTION INTRAVENOUS at 09:10:00

## 2025-04-16 RX ADMIN — ONDANSETRON 4 MG: 2 INJECTION INTRAMUSCULAR; INTRAVENOUS at 10:27:00

## 2025-04-16 NOTE — BRIEF OP NOTE
Pre-Operative Diagnosis: brain mass     Post-Operative Diagnosis: brain mass      Procedure Performed:   LEFT IMAGE GUIDED CRANIOTOMY FOR TUMOR    Surgeons and Role:     * Candido Daigle MD - Primary    Assistant(s):  PA: Palma Roper PA     Surgical Findings: See dictation     Specimen: Left frontal tumor     Estimated Blood Loss: Blood Output: 50 mL (4/16/2025 10:48 AM)          Candido Daigle MD  4/16/2025  11:08 AM

## 2025-04-16 NOTE — CONSULTS
[facility]  Neurocritical Care Consult Note    Sujey Medina Patient Status:  Inpatient    3/6/1950 MRN [id number]   Location [facility] 6NE-A Attending Tessa Goodman MD   Hosp Day # 6 PCP Rajni Tiwari MD       Reason for Consultation:   Brain mass    HPI:   Patient is a 75 year old female with a h/o htn, hl, ckd, bladder ca now s/p crani for tumor resection . Pt admitted 4/10 w/AMS and w/u revealed ct/mri brain with L frontal enhancing mass with assoc vasogenic edema c/f neoplastic process thus started on steroids and decision made to proceed with crani for resection , and pt was transferred to cnicu for further monitoring.     Past Medical History[1]    Past Surgical History[2]    Prescriptions Prior to Admission[3]  Allergies[4]  Social Hx on file[5]  Family History[6]    Current Meds:  Current Hospital Medications[7]    Review of Systems:     Constitutional:    Denies unusual weight loss or weight gain, fever/chills or night sweats.  HEENT:                Denies changes in vision or difficulty swallowing.  Pulm:                    Denies dyspnea, cough, or sputum production  Cardiac:               Denies chest pain, palpitations or lower extremity edema.  GI:                         Denies constipation, heartburn or melena.  :                       Denies dysuria or hematuria.  Skin:                     Denies rashes or open areas.  Neuro:                  As per HPI    All other systems were reviewed and are negative.    Vitals:   Temp:  [97 °F (36.1 °C)-98.3 °F (36.8 °C)] 97.2 °F (36.2 °C)  Pulse:  [50-85] 62  Resp:  [13-26] 15  BP: (102-143)/(43-86) 102/43  SpO2:  [96 %-100 %] 98 %  AO: (133-143)/(60-68) 143/68  Body mass index is 24.3 kg/m².    Intake/Output:    Intake/Output Summary (Last 24 hours) at 2025 1501  Last data filed at 2025 1155  Gross per 24 hour   Intake --   Output 1430 ml   Net -1430 ml       Physical Examination:   General- No acute  distress  CV- RRR  Resp- CTA Bilat  Neuro-  Mental status- awake and alert, regards and follows commands, speech fluent  Cranial nerves- pupils equally round and reactive to light, extraocular muscles intact, face symmetric, visual fields full  Motor- clements x4  Sens-  Intact to light touch    Diagnostics:   No results found.    Lab Review     Recent Labs   Lab 04/13/25  0533 04/14/25  0613 04/15/25  0757    142 141   K 4.2 4.7 4.4    109 109   CO2 23.0 24.0 23.0   * 135* 129*   BUN 26* 24* 27*   CREATSERUM 1.21* 1.17* 1.21*     Recent Labs   Lab 04/13/25  0533 04/14/25  0613 04/15/25  0757   WBC 13.5* 12.1* 13.0*   HGB 11.4* 12.1 12.2   .0 198.0 195.0       Assesment/Plan:     Neuro:  L frontal brain mass- c/f neoplastic process, particularly given h/o bladder ca.   Now s/p crani for resection, postop per Neurosurg.  Cont neurochecks/pt/ot/st.  Cerebral edema- 2/2 above  Cont steroids  Cardiac:  Htn/hl- sbp goal 100-150, cont statin  Pulmonary:  Stable on RA  Renal:  CKD- IVFs, monitor BUN/Cr  GI:  PO as tolerated  Heme/ID:  Afebrile, trend leukocytosis  Endocrine/Rheum:  Monitor glucose, sliding scale insulin prn  DVT Prophylaxis:  SCD’s    Goals of the Day: neurochecks   Upon my evaluation, this patient had a high probability of imminent or life-threatening deterioration due to central nervous system failure,brain mass which required my direct attention, intervention, and personal management.    I have personally provided 45 minutes of critical care time, exclusive of time spent on separately billable procedures.  Time includes review of all pertinent laboratory/radiology results, discussion with consultants, and monitoring for potential decompensation.  Performed interventions included  steroids for cerebral edema .     Thank you for allowing me to participate in the care of this patient.     Mansoor Mccauley MD, [facility]  Medical Director of System Neurosciences  Chief, Section of  Neurocritical Care  [facility]           [1]   Past Medical History:   Cancer (HCC)    CKD (chronic kidney disease), stage III (HCC)    HYPERLIPIDEMIA    Hypertension    OSTEOPENIA    Personal history of bladder cancer    S/P ileoconduit   [2]   Past Surgical History:  Procedure Laterality Date    Benign biopsy right  1988    Colon ca scrn not hi rsk ind N/A 10/28/2015    Procedure: COLONOSCOPY, POSSIBLE BIOPSY, POSSIBLE POLYPECTOMY 69639;  Surgeon: Lai Heard MD;  Location: [facility]    Colonoscopy  6/03    diverticulosis, hemorrhoids    Colonoscopy,diagnostic  10/28/15    diverticulosis    Cystoscopy,insert ureteral stent  3/12/10    Performed by BARNEY IZAGUIRRE at [facility]    Cystoscopy,insert ureteral stent  9/10/2010    Performed by BARNEY IZAGUIRRE at [facility]    Cystoscopy,remv calculus,simple  11/19/2010    Performed by BARNEY IZAGUIRRE at [facility]    Cystourethroscopy  3/2/2011    Performed by APRIL NO at [facility]    Cystourethroscopy,biopsy  11/19/2010    Performed by BARNEY IZAGUIRRE at [facility]    Cystourethroscopy,fulgur 2-5cm lesn  3/12/10    Performed by BARNEY IZAGUIRRE at [facility]    Cystourethroscopy,fulgur 2-5cm lesn  9/10/2010    Performed by BARNEY IZAGUIRRE at [facility]    Fluor gid ctr vad plmt rplcmt/rmvl  3/14/2011    Performed by TANESHA ALEXANDRE at [facility]    Insertion, tunneled centrally inserted venous access device, w/subq port; >5 years  3/14/2011    Performed by TANESHA ALEXANDRE at [facility]    Other surgical history  22 yrs ago    right breast bx    Other surgical history  2-23-09    brennen, Dr. Izaguirre    Other surgical history  6-3-10    cysto-poss EHGHQ-EYH-[facility]. Merrick    Other surgical history  9/10/10    cysto, URS, RPG, TURBT, stent exchange-Dr. Izaguirre    Other surgical  history  2/8/11    cysto- Dr. Izaguirre    Other surgical history      thoracic surgery    Other surgical history  11/20/13     VATS RUL    Patient documented not to have experienced any of the following events N/A 10/28/2015    Procedure: COLONOSCOPY, POSSIBLE BIOPSY, POSSIBLE POLYPECTOMY 22233;  Surgeon: Lai Heard MD;  Location: [facility]    Patient withough preoperative order for iv antibiotic surgical site infection prophylaxis. N/A 10/28/2015    Procedure: COLONOSCOPY, POSSIBLE BIOPSY, POSSIBLE POLYPECTOMY 29603;  Surgeon: Lai Heard MD;  Location: [facility]    Removal of tunneled cva device, with subq port or pump, central or peripheral insertion  12/17/2013    Procedure: PORT-A-CATH REMOVAL;  Surgeon: Bobby Palmer MD;  Location: [facility]    Skin surgery  06/13/2019    MOHS, FELIPE - nasal dorsum, Dr. PATRICIA Ghosh    Tubal ligation      X-ray retrograde pyelogram  3/12/10    Performed by BARNEY IZAGUIRRE at [facility]    X-ray retrograde pyelogram  9/10/2010    Performed by BARNEY IZAGUIRRE at [facility]   [3]   Medications Prior to Admission   Medication Sig Dispense Refill Last Dose/Taking    busPIRone 5 MG Oral Tab Take 1 tablet (5 mg total) by mouth in the morning and 1 tablet (5 mg total) before bedtime.   4/9/2025    metoprolol tartrate 25 MG Oral Tab Take 1 tablet (25 mg total) by mouth 2 (two) times daily.   4/10/2025    buPROPion  MG Oral Tablet 12 Hr Take 1 tablet (150 mg total) by mouth in the morning. (Patient taking differently: Take 200 mg by mouth in the morning.) 30 tablet 0 4/10/2025    acidophilus-pectin Oral Cap Take 1 capsule by mouth in the morning.   4/10/2025    simvastatin 40 MG Oral Tab TAKE ONE TABLET (40 MG TOTAL) BY MOUTH NIGHTLY 90 tablet 3 4/9/2025    Multiple Vitamin (MULTI-VITAMIN DAILY OR) Take 1 tablet by mouth in the morning.   4/10/2025   [4] No Known Allergies  [5]   Social  History  Socioeconomic History    Marital status:     Number of children: 0   Occupational History    Occupation: Semi retired    Tobacco Use    Smoking status: Never    Smokeless tobacco: Never   Vaping Use    Vaping status: Never Used   Substance and Sexual Activity    Alcohol use: Not Currently     Alcohol/week: 2.0 standard drinks of alcohol     Types: 2 Standard drinks or equivalent per week    Drug use: No   Other Topics Concern    Seat Belt Yes   [6]   Family History  Problem Relation Age of Onset    Other (Other) Father         suicide    Heart Disorder Mother     Heart Disorder Maternal Grandmother     Other (Other) Sister         mentally disabled    Cancer Neg    [7]   Current Facility-Administered Medications   Medication Dose Route Frequency    sodium chloride 0.9% infusion   Intravenous Continuous    HYDROcodone-acetaminophen (Norco) 5-325 MG per tab 1 tablet  1 tablet Oral Q6H PRN    morphINE PF 2 MG/ML injection 2 mg  2 mg Intravenous Q2H PRN    acetaminophen (Tylenol) tab 650 mg  650 mg Oral Q4H PRN    niCARdipine in sodium chloride 0.86% (carDENE) 20 mg/200mL infusion premix  5-15 mg/hr Intravenous Continuous PRN    labetalol (Trandate) 5 mg/mL injection 10 mg  10 mg Intravenous Q10 Min PRN    hydrALAzine (Apresoline) 20 mg/mL injection 10 mg  10 mg Intravenous Q1H PRN    ondansetron (Zofran) 4 MG/2ML injection 4 mg  4 mg Intravenous Q6H PRN    dexamethasone (Decadron) 4 MG/ML injection 4 mg  4 mg Intravenous Q6H    [START ON 4/18/2025] dexamethasone (Decadron) tab 4 mg  4 mg Oral 4 times per day    Followed by    [START ON 4/19/2025] dexamethasone (Decadron) tab 4 mg  4 mg Oral Q8H OFE    Followed by    [START ON 4/23/2025] dexamethasone (Decadron) tab 4 mg  4 mg Oral 2 times per day    Followed by    [START ON 4/27/2025] dexamethasone (Decadron) tab 2 mg  2 mg Oral 2 times per day    famotidine (Pepcid) tab 20 mg  20 mg Oral Daily    Or    famotidine (Pepcid) 20 mg/2mL injection  20 mg  20 mg Intravenous Daily    ceFAZolin (Ancef) 2g in 10mL IV syringe premix  2 g Intravenous Q8H    [Transfer Hold] atorvastatin (Lipitor) tab 20 mg  20 mg Oral Nightly    [Held by provider] heparin (Porcine) 5000 UNIT/ML injection 5,000 Units  5,000 Units Subcutaneous 2 times per day    [Transfer Hold] buPROPion SR (WELLBUTRIN SR) 12 hr tab 150 mg  150 mg Oral Daily    [Transfer Hold] busPIRone (Buspar) tab 5 mg  5 mg Oral BID    [Transfer Hold] metoprolol tartrate (Lopressor) tab 25 mg  25 mg Oral BID

## 2025-04-16 NOTE — ANESTHESIA POSTPROCEDURE EVALUATION
[facility]    Sujey Medina Patient Status:  Inpatient   Age/Gender 75 year old female MRN [id number]   Location [facility] SURGERY Attending Tessa Goodman MD   Hosp Day # 6 PCP Rajni Tiwari MD       Anesthesia Post-op Note    LEFT IMAGE GUIDED CRANIOTOMY FOR TUMOR    Procedure Summary       Date: 04/16/25 Room / Location:  MAIN OR 16 / [facility] MAIN OR    Anesthesia Start: 0759 Anesthesia Stop: 1120    Procedures:       LEFT IMAGE GUIDED CRANIOTOMY FOR TUMOR (Head)      NAVIGATION SYSTEM NEURO (Head) Diagnosis: (brain mass)    Surgeons: Candido Daigle MD Anesthesiologist: Jefferson Mendez MD    Anesthesia Type: general ASA Status: 3            Anesthesia Type: general    Vitals Value Taken Time   /67 04/16/25 11:20   Temp 97.0F 04/16/25 11:20   Pulse 82 04/16/25 11:20   Resp 20 04/16/25 11:20   SpO2 100% 04/16/25 11:20           Patient Location: PACU    Anesthesia Type: general    Airway Patency: patent    Postop Pain Control: adequate    Mental Status: preanesthetic baseline    Nausea/Vomiting: none    Cardiopulmonary/Hydration status: stable euvolemic    Complications: no apparent anesthesia related complications    Postop vital signs: stable    Dental Exam: Unchanged from Preop    Patient to be discharged from PACU when criteria met.

## 2025-04-16 NOTE — PROGRESS NOTES
Oncology Progress note     Bladder cancer on treatment with enfortumab    Now noted to have a brain mass after she presented with MS changes.  Hx of Stage IV bladder cancer with known lung metastases--s/p cystectomy with ileal conduit and wedge resection of TONNY nodules.      HPI:  States she is feeling ok this am.  Seen with her  at bedside today.  No confusion over night.  Awake and alert, in bed.  Still on decadron  Case reviewed with Dr. Garcia earlier this week,  Plan for surgery today noted      CT c/a/p with no clear progressive disease noted.     No new complaints  Denying pain     I have reviewed case with Rns  She was on Heparin subcutaneous 5000 bid--this has been held pre operatively.    Past Medical History[1]  Social Hx on file[2]  Vitals:    04/16/25 0400   BP: 138/66   Pulse: 50   Resp: 16   Temp: 98 °F (36.7 °C)     GEN: alert; nad.   HEENT: normocephalic  HEART: regular rate  LUNGS: normal breathing effort   eXT: no edema    Current Hospital Medications[3]       A/P:  75 year old with metastatic bladder cancer currently on enfortumab, here with new brain mass, cerebral edema    Brain mass  -presented with confusion  -MRI shows left frontal lobe mass, measuring 5.7 cm with vasogenic edema. Has been started on dex  Neurosurgery on case  CT c/a/p does not show any acute findings    2. Metastatic bladder cancer  -on treatment with enfortumab  -? Brain mets vs new cns primary    3. DVT prophylaxis--on heparin 5000 bid.   This will need to be held perioperatively.  She will need calf compressors and will review with NRSG as to timing of resuming pharmacologic prophylaxis.      PLAN:  -continue Dex for now 4 mg q 6 hours for now.  -surgery planned today.  -will need radiation oncology--can be arranged as outpatient, pending clinical course  -outpatient follow up with Dr. Woody after discharge  Await final pathology from brain resection planned later today  -discussed with patient and RN  today    Fareed Hastings  [phone]         [1]   Past Medical History:   Cancer (HCC)    CKD (chronic kidney disease), stage III (HCC)    HYPERLIPIDEMIA    Hypertension    OSTEOPENIA    Personal history of bladder cancer    S/P ileoconduit   [2]   Social History  Socioeconomic History    Marital status:     Number of children: 0   Occupational History    Occupation: Semi retired    Tobacco Use    Smoking status: Never    Smokeless tobacco: Never   Vaping Use    Vaping status: Never Used   Substance and Sexual Activity    Alcohol use: Not Currently     Alcohol/week: 2.0 standard drinks of alcohol     Types: 2 Standard drinks or equivalent per week    Drug use: No   Other Topics Concern    Seat Belt Yes   [3]   dexamethasone    atorvastatin    [Held by provider] heparin    acetaminophen    buPROPion SR    busPIRone    metoprolol tartrate

## 2025-04-16 NOTE — PLAN OF CARE
Pt received from PACU.    Pt received oriented to self with expressive aphasia.  Otherwise pt is neurologically intact.  Neuro Surgery and Neuro Critical Care aware.  See neuro flowsheet for details.  Continue neuro checks q 1 hour.    Pt received with radial arterial line in place.  Pt with urostomy.    Scalp incision WNL.     updated on condition and plan of care.

## 2025-04-16 NOTE — PLAN OF CARE
Assumed care at 1930   Alert and oriented x2   NSR on tele and on RA  Denies any pain/discomfort   Rt urostomy intact, see flowsheets   IV steroids per order   NPO for craniotomy at 0630  Call light w/in reach

## 2025-04-16 NOTE — ANESTHESIA PROCEDURE NOTES
Airway  Date/Time: 4/16/2025 8:07 AM  Reason: elective      General Information and Staff   Patient location during procedure: OR  Anesthesiologist: Jefferson Mendez MD  Performed: anesthesiologist   Performed by: Jefferson Mendez MD  Authorized by: Jefferson Mendez MD        Indications and Patient Condition  Indications for airway management: anesthesia  Sedation level: deep      Preoxygenated: yesPatient position: sniffing    Mask difficulty assessment: 1 - vent by mask    Final Airway Details    Final airway type: endotracheal airway    Successful airway: ETT  Cuffed: yes   Successful intubation technique: direct laryngoscopy  Endotracheal tube insertion site: oral  Blade: Ashish  Blade size: #3  ETT size (mm): 7.0    Cormack-Lehane Classification: grade IIA - partial view of glottis  Placement verified by: capnometry   Measured from: lips  ETT to lips (cm): 21  Number of attempts at approach: 1

## 2025-04-16 NOTE — ANESTHESIA PROCEDURE NOTES
Arterial Line    Date/Time: 4/16/2025 8:15 AM    Performed by: Jefferson Mnedez MD  Authorized by: Jefferson Mendez MD    General Information and Staff    Procedure Start:  4/16/2025 8:15 AM  Procedure End:  4/16/2025 8:16 AM  Anesthesiologist:  Jefferson Mendez MD  Performed By:  Anesthesiologist  Patient Location:  OR  Indication: continuous blood pressure monitoring and blood sampling needed    Site Identification: real time ultrasound guided and surface landmarks    Preanesthetic Checklist: 2 patient identifiers, IV checked, risks and benefits discussed, monitors and equipment checked, pre-op evaluation, timeout performed, anesthesia consent and sterile technique used    Procedure Details    Catheter Size:  20 G  Catheter Length:  1 and 3/4 inch  Catheter Type:  Arrow  Seldinger Technique?: Yes    Laterality:  Right  Site:  Radial artery  Site Prep: chlorhexidine    Line Secured:  Tape and Tegaderm    Assessment    Events: patient tolerated procedure well with no complications      Medications  4/16/2025 8:15 AM      Additional Comments

## 2025-04-16 NOTE — OPERATIVE REPORT
Operative Note    Patient Name: Sujey Medina    Preoperative Diagnosis: brain mass    Postoperative Diagnosis: same    Primary Surgeon: Candido Daigle MD    Assistant: Palma Roper PA-C, essentially case including opening, closing and retraction    Procedures: Left image-guided craniotomy for tumor resection, use of microscope    Surgical Findings: See dictation    Anesthesia: General    Complications: none    Implants: DuraGen onlay, Danita plates and screws    Specimen: Left frontal tumor    Drains: None    Condition: Stable    Estimated Blood Loss: 50 mL    Indication for Procedure: 75-year-old female with a history of bladder cancer who presented the ER with confusion.  Patient was worked up.  Patient was discussed with heme-onc.  Patient was felt would be beneficial from surgical resection.  Along discussion with patient and family wish to proceed with surgical resection of tumor.    Procedure: Patient prepped identified and brought back to the OR 16.  Patient placed on the OR table.  Patient placed under general esthesia anesthesia staff.  Iraheta attached the patient's head.  Patient was positioned and all pressure points padded.  Iraheta attached to the OR table.  Navigation was registered to the patient.  Incision was then planned out.  Patient was then sterilely prepped and draped in usual fashion.  20 mL of quarter percent Marcaine with epinephrine was given as local anesthetic.  Incision was made and dissection down to periosteum.  The scalp flap was then pulled forward.  Navigation confirmed we were over the tumor.  The flap was retracted over wet lap.  We then made the periosteal flap.  Of note her tissue was extremely delicate and of poor quality.  Navigation was used to plan out craniotomy.  2 bur holes were then placed with the acorn drill.  Then used a craniotome to make a craniotomy defect.  The bone flap was placed on the back table.  Her dura was extremely friable.  There was  an arachnoid granulation through the dura on the top part of the opening.  Navigation confirmed over tumor.  Tumor was clearly seen through the very poor tissue.  The dura was then opened.  Tumor was seen.  We then started resecting tumor.  Initially there is nice clear borders and as we entered there was no further border.  The tumor was very vascular.  We continued resecting tumor in all directions.  We then brought in the microscope.  We then finished resecting tumor.  Navigation was confirmed around the tumor in all directions.  No CSF was seen.  We inspected all areas of the tumor bed no further tumor was noted.  Hemostasis was achieved.  Of note the tumor was highly vascular.  Again 1 last view of the Mitrakul showed no further tumor that we could see.  The cavity was then lined with Surgicel.  The dura was approximated but of note is clearly friable and did not cover the whole area.  A DuraGen onlay was then placed over that this was followed by the paracranial flap.  Followed by the bone flap with [place] plates and screws.  We then used Tisseel around the edges.  The wound was then closed in layers.  The Belding was attached to the patient's head.  Patient was awakened by anesthesia.  Patient taken recovery.    Dictated but not proofread

## 2025-04-16 NOTE — PROGRESS NOTES
[facility] Hospitalist Progress Note       SUBJECTIVE:  Pt s/p surgery this afternoon   Pt feels okay, having some expressive aphasia          Vitals  Vitals:    04/16/25 1140   BP: 102/43   Pulse: 78   Resp: 15   Temp: 97.2 °F (36.2 °C)         Exam   Gen-    no acute distress, alert    RESP-   Lungs CTA, normal respiratory effort  CV-      Heart RRR, no mgr  Abd-    soft, nondistended, nontender, bowel sounds present     Ext-      No edema in extremities   Psych- alert     Labs:     Recent Labs   Lab 04/11/25 0533 04/12/25  0526 04/13/25  0533 04/14/25  0613 04/15/25  0757   WBC 9.2 12.3* 13.5* 12.1* 13.0*   HGB 12.4 11.9* 11.4* 12.1 12.2   MCV 97.8 101.4* 96.5 97.5 95.9   .0 183.0 195.0 198.0 195.0   INR  --   --   --   --  0.96       Recent Labs   Lab 04/11/25  0555 04/12/25 0526 04/13/25  0533 04/14/25  0613 04/15/25  0757    141 141 142 141   K 5.2*  5.2* 5.5* 4.2 4.7 4.4    109 109 109 109   CO2 16.0* 24.0 23.0 24.0 23.0   BUN 18 19 26* 24* 27*   CREATSERUM 1.14* 1.28* 1.21* 1.17* 1.21*   CA 9.5 9.4 9.0 9.2 9.1   MG  --  2.2 2.1 2.2  --    PHOS  --  2.8 3.1 3.4  --    * 148* 142* 135* 129*       Recent Labs   Lab 04/10/25  2024 04/12/25  0526 04/13/25  0533 04/14/25  0613   ALT 17  --   --   --    AST 27  --   --   --    ALB 4.5 3.9 3.7 3.8       No results for input(s): \"PGLU\" in the last 168 hours.    AP:  75 year old female with PMH of stage IV bladder cancer with lung metastases s/p cystectomy with ileal conduit and wedge resection of TONNY nodules currently on Enfortumab, HTN, HLD, CKD3b, MDD presenting to the hospital for AMS     Left frontal lobe mass with vasogenic edema  Acute Metabolic Encephalopathy  -Imminent threat to neurologic function given patient's altered mental status, could be primary CNS tumor versus metastases  -CT C/A/P: negative for other metastases        Plan:  - s/p left image guided craniotaomy for brain felicia 4/16   - steroids per NSG  - Per onc:  will need  radiation oncology outpatient     Stage IV bladder cancer s/p cystectomy with ileal conduit  Hx of Lung metastases s/p wedge resection of TONYN nodules  Plan:  -Oncology consulted inpatient, recommending eventual radiation as outpatient     Pyuria  UTI/Acute Cystitis - ruled out  -Urostomy likely colonized, no sign/concerns of an acute infection at this time  -Per ID, no need for antibiotics     CKD stage IIIb  Hyperkalemia - resolved  -Serial RFP's     Hyperlipidemia  -Continue home statin     Depression  -Continue home bupropion, buspirone     Hypertension  -Continue home beta-blocker         Tessa Goodman MD

## 2025-04-16 NOTE — ANESTHESIA PROCEDURE NOTES
Peripheral IV  Date/Time: 4/16/2025 8:16 AM  Inserted by: Jefferson Mendez MD    Placement  Needle size: 18 G  Laterality: right  Location: forearm  Local anesthetic: none  Site prep: alcohol  Technique: anatomical landmarks  Attempts: 1

## 2025-04-17 ENCOUNTER — NURSE ONLY (OUTPATIENT)
Dept: ELECTROPHYSIOLOGY | Facility: HOSPITAL | Age: 75
End: 2025-04-17
Attending: NURSE PRACTITIONER
Payer: MEDICARE

## 2025-04-17 ENCOUNTER — APPOINTMENT (OUTPATIENT)
Dept: CT IMAGING | Facility: HOSPITAL | Age: 75
End: 2025-04-17
Attending: NURSE PRACTITIONER
Payer: MEDICARE

## 2025-04-17 PROBLEM — R56.9 SEIZURE (HCC): Status: ACTIVE | Noted: 2025-04-17

## 2025-04-17 LAB
ANION GAP SERPL CALC-SCNC: 11 MMOL/L (ref 0–18)
BASOPHILS # BLD: 0 X10(3) UL (ref 0–0.2)
BASOPHILS NFR BLD: 0 %
BLOOD TYPE BARCODE: 5100
BUN BLD-MCNC: 25 MG/DL (ref 9–23)
CALCIUM BLD-MCNC: 7.3 MG/DL (ref 8.7–10.6)
CHLORIDE SERPL-SCNC: 110 MMOL/L (ref 98–112)
CO2 SERPL-SCNC: 20 MMOL/L (ref 21–32)
CREAT BLD-MCNC: 0.99 MG/DL (ref 0.55–1.02)
EGFRCR SERPLBLD CKD-EPI 2021: 59 ML/MIN/1.73M2 (ref 60–?)
EOSINOPHIL # BLD: 0 X10(3) UL (ref 0–0.7)
EOSINOPHIL NFR BLD: 0 %
ERYTHROCYTE [DISTWIDTH] IN BLOOD BY AUTOMATED COUNT: 13.4 %
GLUCOSE BLD-MCNC: 104 MG/DL (ref 70–99)
GLUCOSE BLD-MCNC: 122 MG/DL (ref 70–99)
GLUCOSE BLD-MCNC: 132 MG/DL (ref 70–99)
GLUCOSE BLD-MCNC: 141 MG/DL (ref 70–99)
HCT VFR BLD AUTO: 34.5 % (ref 35–48)
HGB BLD-MCNC: 11.6 G/DL (ref 12–16)
LYMPHOCYTES NFR BLD: 1.2 X10(3) UL (ref 1–4)
LYMPHOCYTES NFR BLD: 7 %
MCH RBC QN AUTO: 32.7 PG (ref 26–34)
MCHC RBC AUTO-ENTMCNC: 33.6 G/DL (ref 31–37)
MCV RBC AUTO: 97.2 FL (ref 80–100)
MONOCYTES # BLD: 0.69 X10(3) UL (ref 0.1–1)
MONOCYTES NFR BLD: 4 %
MORPHOLOGY: NORMAL
NEUTROPHILS # BLD AUTO: 14.28 X10 (3) UL (ref 1.5–7.7)
NEUTROPHILS NFR BLD: 87 %
NEUTS BAND NFR BLD: 2 %
NEUTS HYPERSEG # BLD: 15.31 X10(3) UL (ref 1.5–7.7)
OSMOLALITY SERPL CALC.SUM OF ELEC: 298 MOSM/KG (ref 275–295)
PLATELET # BLD AUTO: 178 10(3)UL (ref 150–450)
PLATELET MORPHOLOGY: NORMAL
POTASSIUM SERPL-SCNC: 4 MMOL/L (ref 3.5–5.1)
RBC # BLD AUTO: 3.55 X10(6)UL (ref 3.8–5.3)
SODIUM SERPL-SCNC: 141 MMOL/L (ref 136–145)
TOTAL CELLS COUNTED BLD: 100
UNIT VOLUME: 350 ML
WBC # BLD AUTO: 17.2 X10(3) UL (ref 4–11)

## 2025-04-17 PROCEDURE — 99291 CRITICAL CARE FIRST HOUR: CPT | Performed by: INTERNAL MEDICINE

## 2025-04-17 PROCEDURE — 70450 CT HEAD/BRAIN W/O DYE: CPT | Performed by: NURSE PRACTITIONER

## 2025-04-17 PROCEDURE — 99292 CRITICAL CARE ADDL 30 MIN: CPT | Performed by: INTERNAL MEDICINE

## 2025-04-17 RX ORDER — NICOTINE POLACRILEX 4 MG
15 LOZENGE BUCCAL
Status: DISCONTINUED | OUTPATIENT
Start: 2025-04-17 | End: 2025-04-20

## 2025-04-17 RX ORDER — DEXTROSE MONOHYDRATE 25 G/50ML
50 INJECTION, SOLUTION INTRAVENOUS
Status: DISCONTINUED | OUTPATIENT
Start: 2025-04-17 | End: 2025-04-20

## 2025-04-17 RX ORDER — LEVETIRACETAM 500 MG/5ML
1000 INJECTION, SOLUTION, CONCENTRATE INTRAVENOUS EVERY 12 HOURS
Status: DISCONTINUED | OUTPATIENT
Start: 2025-04-17 | End: 2025-04-18

## 2025-04-17 RX ORDER — LORAZEPAM 2 MG/ML
INJECTION INTRAMUSCULAR
Status: DISPENSED
Start: 2025-04-17 | End: 2025-04-17

## 2025-04-17 RX ORDER — NICOTINE POLACRILEX 4 MG
30 LOZENGE BUCCAL
Status: DISCONTINUED | OUTPATIENT
Start: 2025-04-17 | End: 2025-04-20

## 2025-04-17 RX ORDER — LORAZEPAM 2 MG/ML
2 INJECTION INTRAMUSCULAR
Status: DISCONTINUED | OUTPATIENT
Start: 2025-04-17 | End: 2025-04-20

## 2025-04-17 RX ORDER — LORAZEPAM 2 MG/ML
INJECTION INTRAMUSCULAR
Status: COMPLETED
Start: 2025-04-17 | End: 2025-04-17

## 2025-04-17 RX ORDER — SODIUM CHLORIDE 9 MG/ML
INJECTION, SOLUTION INTRAVENOUS CONTINUOUS
Status: DISCONTINUED | OUTPATIENT
Start: 2025-04-17 | End: 2025-04-18

## 2025-04-17 RX ORDER — BUPROPION HYDROCHLORIDE 100 MG/1
200 TABLET, EXTENDED RELEASE ORAL DAILY
Status: DISCONTINUED | OUTPATIENT
Start: 2025-04-18 | End: 2025-04-20

## 2025-04-17 RX ORDER — BUPROPION HYDROCHLORIDE 200 MG/1
200 TABLET, EXTENDED RELEASE ORAL DAILY
COMMUNITY

## 2025-04-17 NOTE — PHYSICAL THERAPY NOTE
Order received for PT eval and chart reviewed. Attempted to see Pt this am, however, Pt occupied with continuous EEG. PT will continue to follow.

## 2025-04-17 NOTE — PROGRESS NOTES
[facility] Hospitalist Progress Note       SUBJECTIVE:   Pt unable to tell me her name  Had seizure activity on 4/16     OBJECTIVE:  Scheduled Meds: Scheduled Medications[1]  Continuous Infusions: Medication Infusions[2]  PRN Meds: PRN Medications[3]    Vitals  Vitals:    04/17/25 0700   BP: 131/70   Pulse: 65   Resp: 18   Temp:          Exam   Gen-    no acute distress, alert  but not able to tell me her name   RESP-   Lungs CTA  CV-      Heart RRR, no mgr  Abd-    soft, nondistended, nontender, bowel sounds present  Skin-    no rash  Neuro-  moving extremities, follows commands           Labs:     Recent Labs   Lab 04/12/25 0526 04/13/25  0533 04/14/25  0613 04/15/25  0757 04/17/25  0404   WBC 12.3* 13.5* 12.1* 13.0* 17.2*   HGB 11.9* 11.4* 12.1 12.2 11.6*   .4* 96.5 97.5 95.9 97.2   .0 195.0 198.0 195.0 178.0   BAND  --   --   --   --  2   INR  --   --   --  0.96  --        Recent Labs   Lab 04/12/25 0526 04/13/25  0533 04/14/25  0613 04/15/25  0757 04/17/25  0404    141 142 141 141   K 5.5* 4.2 4.7 4.4 4.0    109 109 109 110   CO2 24.0 23.0 24.0 23.0 20.0*   BUN 19 26* 24* 27* 25*   CREATSERUM 1.28* 1.21* 1.17* 1.21* 0.99   CA 9.4 9.0 9.2 9.1 7.3*   MG 2.2 2.1 2.2  --   --    PHOS 2.8 3.1 3.4  --   --    * 142* 135* 129* 132*       Recent Labs   Lab 04/10/25  2024 04/12/25  0526 04/13/25  0533 04/14/25  0613   ALT 17  --   --   --    AST 27  --   --   --    ALB 4.5 3.9 3.7 3.8       Recent Labs   Lab 04/17/25  0041 04/17/25  0546   PGLU 141* 122*       AP:  75 year old female with PMH of stage IV bladder cancer with lung metastases s/p cystectomy with ileal conduit and wedge resection of TONNY nodules currently on Enfortumab, HTN, HLD, CKD3b, MDD presenting to the hospital for AMS     Left frontal lobe mass with vasogenic edema  Acute Metabolic Encephalopathy  -Imminent threat to neurologic function given patient's altered mental status, could be primary CNS tumor versus metastases  -CT  C/A/P: negative for other metastases    Plan:  - s/p left image guided craniotaomy for brain mass on 4/16, had 90 sec seizure, whole body shocking, was given ativan on 4/17, pt started on keppra   - steroids per NSG  - Per onc:  will need radiation oncology outpatient     Stage IV bladder cancer s/p cystectomy with ileal conduit  Hx of Lung metastases s/p wedge resection of TONNY nodules  Plan:  -Oncology consulted inpatient, recommending eventual radiation as outpatient     Pyuria  UTI/Acute Cystitis - ruled out  -Urostomy likely colonized, no sign/concerns of an acute infection at this time  -Per ID, no need for antibiotics     CKD stage IIIb  Hyperkalemia - resolved  -Serial RFP's     Hyperlipidemia  -Continue home statin     Depression  -Continue home bupropion, buspirone     Hypertension  -Continue home beta-blocker           Tessa Goodman MD               [1]    LORazepam        levETIRAcetam  1,000 mg Intravenous Q12H    dexamethasone  4 mg Intravenous Q6H    [START ON 4/18/2025] dexamethasone  4 mg Oral 4 times per day    Followed by    [START ON 4/19/2025] dexamethasone  4 mg Oral Q8H OFE    Followed by    [START ON 4/23/2025] dexamethasone  4 mg Oral 2 times per day    Followed by    [START ON 4/27/2025] dexamethasone  2 mg Oral 2 times per day    famotidine  20 mg Oral Daily    Or    famotidine  20 mg Intravenous Daily    atorvastatin  20 mg Oral Nightly    [Held by provider] heparin  5,000 Units Subcutaneous 2 times per day    buPROPion SR  150 mg Oral Daily    busPIRone  5 mg Oral BID    metoprolol tartrate  25 mg Oral BID   [2]    sodium chloride 80 mL/hr at 04/17/25 0528    niCARdipine     [3]   LORazepam    glucose **OR** glucose **OR** glucose-vitamin C **OR** dextrose **OR** glucose **OR** glucose **OR** glucose-vitamin C    LORazepam    HYDROcodone-acetaminophen    morphINE    acetaminophen    niCARdipine    labetalol    hydrALAzine    ondansetron

## 2025-04-17 NOTE — SLP NOTE
ADULT SLP RE-EVALUATION    ASSESSMENT    ASSESSMENT/OVERALL IMPRESSION:  Order received as per protocol. Patient s/p left craniotomy for tumor resection yesterday.  Later last night, patient noted with seizure episode ~90 seconds.  Given Ativan and Keppra; stat head CT obtained with postsurgical changes and vasogenic edema.   See EMR for further medical history.  Patient remains in [facility] and RN requested for SLP to proceed.  Patient with continuous EEG in place.     Patient received awake, alert, and visually regarded to SLP.  Patient with left eye closed. Patient followed simple oral motor commands with improved accuracy with visual model.  Suspect reduced lingual coordination however no focal weakness noted.  Vocal quality clear.  Patient assisted with PO trials.      No overt clinical s/s aspiration observed or suspected, however, minimal PO trials accepted by patient.  Recommend con't PO diet as tolerated.  SLP to follow-up x1 for diet texture analysis.        [place] Assessment of Swallow Function Score: No abnormality detected    Patient administered the Bedside Western Aphasia Battery- Revised (WAB-R). The WAB-R is an individually administered test designed to evaluate a patient's language function following a stroke, dementia, or there acquired neurological disorder.  The following scores were obtained:      Spontaneous Speech Content 3/10   Spontaneous Speech Fluency 0/10   Auditory Verbal Comprehension Yes/No questions 7/10   Auditory Verbal Comprehension Sequential Commands 0/10   Oral Expression Repetition 3/10   Oral Expression Object Naming 5/10      BEDSIDE APHASIA SCORE:  30 (a score of greater or equal to 93.8 is considered to be within normal limits on the comprehensive Western Aphasia Battery-Revised. This score can be used as a means of comparison on the Bedside Western Aphasia Battery-Revised).     Writing: DNT     Reading: DNT    BEDSIDE LANGUAGE SCORE: could not be obtained at this time due to  reading and writing subtests deferred.  Level of assistance/compensatory: The patient benefitted from phonemic cues and visual cues.  Spontaneous speech/rote tasks 100% acc.  Perseverations at word level observed x3.   Assessment(s) Administered: WAB Bedside Score  WAB Bedside Score:  (30)     Aphasic Depression Rating Scale Administered: No     Patient Experiencing Pain: No        RECOMMENDATIONS   Diet Recommendations - Solids: Regular  Diet Recommendations - Liquids: Thin Liquids  Aspiration Precautions: Upright position  Medication Administration Recommendations: No restrictions  Treatment Plan/Recommendations: Aphasia therapy    HISTORY   MEDICAL HISTORY  Reason for Referral:  (Post op crani)    Problem List  Principal Problem:    Brain mass  Active Problems:    Seizure (HCC)      Past Medical History  Past Medical History[1]    Prior Living Situation: Home with spouse  Diet Prior to Admission: Regular, Thin liquids       Patient/Family Goals: patient unable to state    SWALLOWING HISTORY  Current Diet Consistency: Regular, Thin liquids    Dysphagia History: reg diet, thin liquids    Imaging Results: head CT 4/17/25:  Interval postsurgical changes of left frontal craniotomy for left frontal lobe mass resection.  Postsurgical changes are noted as detailed above. Persistent hyperdensity within the left frontal lobe and left upper lobe noted from vasogenic edema.  There is persistent mass effect and approximately 6 mm of left-to-right midline shift.  Trace blood products are noted in the left frontal lobe resection cavity consistent with recent postsurgical change.       OBJECTIVE   ORAL MOTOR EXAMINATION  Dentition: Functional  Symmetry: Within Functional Limits  Strength: Within Functional Limits  Tone: Within Functional Limits  Range of Motion: Within Functional Limits  Rate of Motion: Reduced    Voice Quality: Clear  Respiratory Status: Unlabored  Consistencies Trialed: Thin liquids, Puree, Hard solid  Method  of Presentation: Staff/Clinician assistance  Patient Positioned: Upright, Midline    Oral Phase of Swallow: Within Functional Limits        Pharyngeal Phase of Swallow: Within Functional Limits      (Please note: Silent aspiration cannot be evaluated clinically. Videofluoroscopic Swallow Study is required to rule-out silent aspiration.)    Esophageal Phase of Swallow: No complaints consistent with possible esophageal involvement                GOALS  Goal #1 The patient will tolerate regular consistency and thin liquids without overt signs or symptoms of aspiration with 90 % accuracy over 1-2 session(s).  In Progress   Goal #2 The patient/family/caregiver will demonstrate understanding and implementation of aspiration precautions and swallow strategies independently over 1-2 session(s).    In Progress   Goal #3 The patient will follow 2 step directions with x1-2 cues with 70 % accuracy within 3 3 session(s).  In Progress   Goal #4 The patient will identify common object in a field of 2 with min cues with 80 % accuracy within 2 2 session(s).    In Progress   Goal #5 The patient will complete Confrontation, Responsve naming tasks with mod cues with 75 % accuracy within 3 3 session(s).     In Progress   Goal #6 The patient will complete sentences with mod cues with 80 % accuracy within 3 session(s).     In Progress   Goal #7 Ongoing diagnostic intervention and goals to be added as appropriate   In Progress     FOLLOW UP  Treatment Plan/Recommendations: Aphasia therapy  Duration: 1 week  Follow Up Needed (Documentation Required): Yes  SLP Follow-up Date: 04/18/25    Thank you for your referral.   If you have any questions, please contact JUAN Rivera, MS CCC-SLP/L  Speech-Language Pathologist  [facility]Link [phone]         [1]   Past Medical History:   Cancer (HCC)    CKD (chronic kidney disease), stage III (HCC)    HYPERLIPIDEMIA    Hypertension    OSTEOPENIA    Personal history of bladder cancer    S/P  ileoconduit

## 2025-04-17 NOTE — PLAN OF CARE
Assumed care of patient at approximately 0730. Pt A&O self currently. When asked her name pt will respond with her birthday. Expressive aphasia noted. Neuro checks Q1h, see flowseets. Continuous EEG. Pt maintaining saturations on room air . On telemetry, SB\/NSR.  Pt denies c/o pain. AM metoprolol held d/t HR in upper 50's/low 60's, the rest of her PO medications held d/t lethargy . Pt and  updated on plan of care and verbalized understanding.     Problem: Patient/Family Goals  Goal: Patient/Family Long Term Goal  Description: Patient's Long Term Goal: Return home with family, adequate resources, stable neurological status    Interventions:  - PT/OT, neuro checks per protocol  - See additional Care Plan goals for specific interventions  Outcome: Progressing  Goal: Patient/Family Short Term Goal  Description: Patient's Short Term Goal: Absence of seizures and complications    Interventions:   - Monitoring, scans per MD  - See additional Care Plan goals for specific interventions  Outcome: Progressing     Problem: PAIN - ADULT  Goal: Verbalizes/displays adequate comfort level or patient's stated pain goal  Description: INTERVENTIONS:- Encourage pt to monitor pain and request assistance- Assess pain using appropriate pain scale- Administer analgesics based on type and severity of pain and evaluate response- Implement non-pharmacological measures as appropriate and evaluate response- Consider cultural and social influences on pain and pain management- Manage/alleviate anxiety- Utilize distraction and/or relaxation techniques- Monitor for opioid side effects- Notify MD/LIP if interventions unsuccessful or patient reports new pain- Anticipate increased pain with activity and pre-medicate as appropriate  Outcome: Progressing     Problem: RISK FOR INFECTION - ADULT  Goal: Absence of fever/infection during anticipated neutropenic period  Description: INTERVENTIONS- Monitor WBC- Administer growth factors as ordered-  Implement neutropenic guidelines  Outcome: Progressing     Problem: SAFETY ADULT - FALL  Goal: Free from fall injury  Description: INTERVENTIONS:- Assess pt frequently for physical needs- Identify cognitive and physical deficits and behaviors that affect risk of falls.- [place] fall precautions as indicated by assessment.- Educate pt/family on patient safety including physical limitations- Instruct pt to call for assistance with activity based on assessment- Modify environment to reduce risk of injury- Provide assistive devices as appropriate- Consider OT/PT consult to assist with strengthening/mobility- Encourage toileting schedule  Outcome: Progressing     Problem: DISCHARGE PLANNING  Goal: Discharge to home or other facility with appropriate resources  Description: INTERVENTIONS:- Identify barriers to discharge w/pt and caregiver- Include patient/family/discharge partner in discharge planning- Arrange for needed discharge resources and transportation as appropriate- Identify discharge learning needs (meds, wound care, etc)- Arrange for interpreters to assist at discharge as needed- Consider post-discharge preferences of patient/family/discharge partner- Complete POLST form as appropriate- Assess patient's ability to be responsible for managing their own health- Refer to Case Management Department for coordinating discharge planning if the patient needs post-hospital services based on physician/LIP order or complex needs related to functional status, cognitive ability or social support system  Outcome: Progressing     Problem: Altered Communication/Language Barrier  Goal: Patient/Family is able to understand and participate in their care  Description: Interventions:- Assess communication ability and preferred communication style- Implement communication aides and strategies- Use visual cues when possible- Listen attentively, be patient, do not interrupt- Minimize distractions- Allow time for understanding and  response- Establish method for patient to ask for assistance (call light)- Provide an  as needed- Communicate barriers and strategies to overcome with those who interact with patient  Outcome: Progressing     Problem: SKIN/TISSUE INTEGRITY - ADULT  Goal: Skin integrity remains intact  Description: INTERVENTIONS- Assess and document risk factors for pressure ulcer development- Assess and document skin integrity- Monitor for areas of redness and/or skin breakdown- Initiate interventions, skin care algorithm/standards of care as needed  Outcome: Progressing  Goal: Incision(s), wounds(s) or drain site(s) healing without S/S of infection  Description: INTERVENTIONS:- Assess and document risk factors for pressure ulcer development- Assess and document skin integrity- Assess and document dressing/incision, wound bed, drain sites and surrounding tissue- Implement wound care per orders- Initiate isolation precautions as appropriate- Initiate Pressure Ulcer prevention bundle as indicated  Outcome: Progressing     Problem: NEUROLOGICAL - ADULT  Goal: Achieves stable or improved neurological status  Description: INTERVENTIONS- Assess for and report changes in neurological status- Initiate measures to prevent increased intracranial pressure- Maintain blood pressure and fluid volume within ordered parameters to optimize cerebral perfusion and minimize risk of hemorrhage- Monitor temperature, glucose, and sodium. Initiate appropriate interventions as ordered  Outcome: Progressing  Goal: Absence of seizures  Description: INTERVENTIONS- Monitor for seizure activity- Administer anti-seizure medications as ordered- Monitor neurological status  Outcome: Progressing  Goal: Remains free of injury related to seizure activity  Description: INTERVENTIONS:- Maintain airway, patient safety  and administer oxygen as ordered- Monitor patient for seizure activity, document and report duration and description of seizure to MD/LIP- If  seizure occurs, turn patient to side and suction secretions as needed- Reorient patient post seizure- Seizure pads on all 4 side rails- Instruct patient/family to notify RN of any seizure activity- Instruct patient/family to call for assistance with activity based on assessment  Outcome: Progressing  Goal: Achieves maximal functionality and self care  Description: INTERVENTIONS- Monitor swallowing and airway patency with patient fatigue and changes in neurological status- Encourage and assist patient to increase activity and self care with guidance from PT/OT- Encourage visually impaired, hearing impaired and aphasic patients to use assistive/communication devices  Outcome: Progressing

## 2025-04-17 NOTE — PLAN OF CARE
Assumed cares about 1930, VS stable on RA at start of shift. Patient noted to have a seizure episode about 0032 this evening lasting about 90 seconds. Ativan given at that time. STAT CT head obtained. Prior to seizure episode, patient was oriented x3-4, would occasionally state incorrect month, answered orientation questions correctly if given multiple choices or yes/no answers, followed all commands, pupils equal and reactive, no numbness/tingling, LUE noted to be very mildly weaker than RUE. Patient denied pain/discomfort when awake and answering questions.  called by APRN overnight with updates and informing of event. Patient now starting to wake with repeated stimuli, incomprehensible speech noted, follows commands at this time. No further concerns at this time    Problem: Patient/Family Goals  Goal: Patient/Family Long Term Goal  Description: Patient's Long Term Goal: Return home with family, adequate resources, stable neurological status    Interventions:  - PT/OT, neuro checks per protocol  - See additional Care Plan goals for specific interventions  Outcome: Progressing  Goal: Patient/Family Short Term Goal  Description: Patient's Short Term Goal: Absence of seizures and complications    Interventions:   - Monitoring, scans per MD  - See additional Care Plan goals for specific interventions  Outcome: Progressing     Problem: PAIN - ADULT  Goal: Verbalizes/displays adequate comfort level or patient's stated pain goal  Description: INTERVENTIONS:- Encourage pt to monitor pain and request assistance- Assess pain using appropriate pain scale- Administer analgesics based on type and severity of pain and evaluate response- Implement non-pharmacological measures as appropriate and evaluate response- Consider cultural and social influences on pain and pain management- Manage/alleviate anxiety- Utilize distraction and/or relaxation techniques- Monitor for opioid side effects- Notify MD/LIP if interventions  unsuccessful or patient reports new pain- Anticipate increased pain with activity and pre-medicate as appropriate  Outcome: Progressing     Problem: RISK FOR INFECTION - ADULT  Goal: Absence of fever/infection during anticipated neutropenic period  Description: INTERVENTIONS- Monitor WBC- Administer growth factors as ordered- Implement neutropenic guidelines  Outcome: Progressing     Problem: SAFETY ADULT - FALL  Goal: Free from fall injury  Description: INTERVENTIONS:- Assess pt frequently for physical needs- Identify cognitive and physical deficits and behaviors that affect risk of falls.- [place] fall precautions as indicated by assessment.- Educate pt/family on patient safety including physical limitations- Instruct pt to call for assistance with activity based on assessment- Modify environment to reduce risk of injury- Provide assistive devices as appropriate- Consider OT/PT consult to assist with strengthening/mobility- Encourage toileting schedule  Outcome: Progressing     Problem: DISCHARGE PLANNING  Goal: Discharge to home or other facility with appropriate resources  Description: INTERVENTIONS:- Identify barriers to discharge w/pt and caregiver- Include patient/family/discharge partner in discharge planning- Arrange for needed discharge resources and transportation as appropriate- Identify discharge learning needs (meds, wound care, etc)- Arrange for interpreters to assist at discharge as needed- Consider post-discharge preferences of patient/family/discharge partner- Complete POLST form as appropriate- Assess patient's ability to be responsible for managing their own health- Refer to Case Management Department for coordinating discharge planning if the patient needs post-hospital services based on physician/LIP order or complex needs related to functional status, cognitive ability or social support system  Outcome: Progressing     Problem: Altered Communication/Language Barrier  Goal: Patient/Family is  able to understand and participate in their care  Description: Interventions:- Assess communication ability and preferred communication style- Implement communication aides and strategies- Use visual cues when possible- Listen attentively, be patient, do not interrupt- Minimize distractions- Allow time for understanding and response- Establish method for patient to ask for assistance (call light)- Provide an  as needed- Communicate barriers and strategies to overcome with those who interact with patient  Outcome: Progressing     Problem: SKIN/TISSUE INTEGRITY - ADULT  Goal: Skin integrity remains intact  Description: INTERVENTIONS- Assess and document risk factors for pressure ulcer development- Assess and document skin integrity- Monitor for areas of redness and/or skin breakdown- Initiate interventions, skin care algorithm/standards of care as needed  Outcome: Progressing  Goal: Incision(s), wounds(s) or drain site(s) healing without S/S of infection  Description: INTERVENTIONS:- Assess and document risk factors for pressure ulcer development- Assess and document skin integrity- Assess and document dressing/incision, wound bed, drain sites and surrounding tissue- Implement wound care per orders- Initiate isolation precautions as appropriate- Initiate Pressure Ulcer prevention bundle as indicated  Outcome: Progressing     Problem: NEUROLOGICAL - ADULT  Goal: Achieves stable or improved neurological status  Description: INTERVENTIONS- Assess for and report changes in neurological status- Initiate measures to prevent increased intracranial pressure- Maintain blood pressure and fluid volume within ordered parameters to optimize cerebral perfusion and minimize risk of hemorrhage- Monitor temperature, glucose, and sodium. Initiate appropriate interventions as ordered  Outcome: Progressing  Goal: Absence of seizures  Description: INTERVENTIONS- Monitor for seizure activity- Administer anti-seizure medications  as ordered- Monitor neurological status  Outcome: Progressing  Goal: Remains free of injury related to seizure activity  Description: INTERVENTIONS:- Maintain airway, patient safety  and administer oxygen as ordered- Monitor patient for seizure activity, document and report duration and description of seizure to MD/LIP- If seizure occurs, turn patient to side and suction secretions as needed- Reorient patient post seizure- Seizure pads on all 4 side rails- Instruct patient/family to notify RN of any seizure activity- Instruct patient/family to call for assistance with activity based on assessment  Outcome: Progressing  Goal: Achieves maximal functionality and self care  Description: INTERVENTIONS- Monitor swallowing and airway patency with patient fatigue and changes in neurological status- Encourage and assist patient to increase activity and self care with guidance from PT/OT- Encourage visually impaired, hearing impaired and aphasic patients to use assistive/communication devices  Outcome: Progressing

## 2025-04-17 NOTE — PROGRESS NOTES
OT order received and chart reviewed. Pt occupied with continuous EEG. Will follow up as able and appropriate.

## 2025-04-17 NOTE — PROGRESS NOTES
Oncology Progress note     Bladder cancer on treatment with enfortumab    Now noted to have a brain mass after she presented with MS changes.  Hx of Stage IV bladder cancer with known lung metastases--s/p cystectomy with ileal conduit and wedge resection of TONNY nodules.      HPI:  Surgery went well yesterday.   She had image guided craniotomy on L side.  Operative notes reviewed--highly vascular tumor noted.  Transferred to CN ICU yesterday.    Had a seizure last night and has been started on Keppra  CT brain last night with postsurgical changes and vasogenic edema.      CT c/a/p with no clear progressive disease noted.     No new complaints  Denying pain     I have reviewed case with Rns  She was on Heparin subcutaneous 5000 bid--this has been held pre operatively.    Past Medical History[1]  Social Hx on file[2]  Vitals:    04/17/25 0700   BP: 131/70   Pulse: 65   Resp: 18   Temp:      GEN: alert; nad.   HEENT: normocephalic  HEART: regular rate  LUNGS: normal breathing effort   eXT: no edema    Current Hospital Medications[3]       A/P:  75 year old with metastatic bladder cancer currently on enfortumab, here with new brain mass, cerebral edema    Brain mass  -presented with confusion  -MRI shows left frontal lobe mass, measuring 5.7 cm with vasogenic edema. Has been started on dex.  She is now s/p resection and being monitored in CN ICU.   Pathology pending.  CT c/a/p does not show any acute findings    2. Metastatic bladder cancer  -on treatment with enfortumab  -? Brain mets vs new cns primary    3. DVT prophylaxis--on heparin 5000 bid.   This will need to be held perioperatively.  She will need calf compressors and will review with NRSG as to timing of resuming pharmacologic prophylaxis.      PLAN:  -continue on dexamethasone for now, currently on 4 mg q 6 hours.  Now on Keppra post seizure.  - POD 1 today.  Await final pathology  -will need radiation oncology--can be arranged as outpatient, pending clinical  course  -outpatient follow up with Dr. Woody after discharge  -discussed with patient and RN and  yesterday    aFreed Hastings  [phone]         [1]   Past Medical History:   Cancer (HCC)    CKD (chronic kidney disease), stage III (HCC)    HYPERLIPIDEMIA    Hypertension    OSTEOPENIA    Personal history of bladder cancer    S/P ileoconduit   [2]   Social History  Socioeconomic History    Marital status:     Number of children: 0   Occupational History    Occupation: Semi retired    Tobacco Use    Smoking status: Never    Smokeless tobacco: Never   Vaping Use    Vaping status: Never Used   Substance and Sexual Activity    Alcohol use: Not Currently     Alcohol/week: 2.0 standard drinks of alcohol     Types: 2 Standard drinks or equivalent per week    Drug use: No   Other Topics Concern    Seat Belt Yes   [3]   LORazepam    glucose **OR** glucose **OR** glucose-vitamin C **OR** dextrose **OR** glucose **OR** glucose **OR** glucose-vitamin C    LORazepam    levETIRAcetam    sodium chloride    HYDROcodone-acetaminophen    morphINE    acetaminophen    niCARdipine    labetalol    hydrALAzine    ondansetron    dexamethasone    [START ON 4/18/2025] dexamethasone **FOLLOWED BY** [START ON 4/19/2025] dexamethasone **FOLLOWED BY** [START ON 4/23/2025] dexamethasone **FOLLOWED BY** [START ON 4/27/2025] dexamethasone    famotidine **OR** famotidine    ceFAZolin    atorvastatin    [Held by provider] heparin    buPROPion SR    busPIRone    metoprolol tartrate

## 2025-04-17 NOTE — PROGRESS NOTES
[facility]  Neurocritical Care Progress Note     Sujey Medina Patient Status:  Inpatient    3/6/1950 MRN [id number]   Location [facility] 6NE-A Attending Tessa Goodman MD   Hosp Day # 7 PCP Rajni Tiwari MD     Subjective:     Pt with tonic-clonic event overnight c/f GTC seizure, loaded with ativan and keppra    Vitals:   Temp:  [96.6 °F (35.9 °C)-97.6 °F (36.4 °C)] 96.6 °F (35.9 °C)  Pulse:  [] 65  Resp:  [11-26] 18  BP: (102-148)/(43-91) 131/70  SpO2:  [92 %-100 %] 100 %  AO: (100-163)/(53-93) 100/79  Body mass index is 24.84 kg/m².    Intake/Output:    Intake/Output Summary (Last 24 hours) at 2025 0913  Last data filed at 2025 0600  Gross per 24 hour   Intake 1814.5 ml   Output 1975 ml   Net -160.5 ml     Current Meds:  Current Hospital Medications[1]  Physical Examination:   General- No acute distress  CV- RRR  Resp- CTA Bilat  Neuro-  Mental status- awake and alert, regards and follows most commands, speech slow with perseveration and moderate exp aphasia (naming 1/3)  Cranial nerves- pupils equally round and reactive to light, extraocular muscles intact, face symmetric  Motor- clements L>R  Sens-  Intact to light touch    Diagnostics:   CT BRAIN OR HEAD (CPT=70450)  Result Date: 2025  CONCLUSION:  Interval postsurgical changes of left frontal craniotomy for left frontal lobe mass resection.  Postsurgical changes are noted as detailed above.  Persistent hyperdensity within the left frontal lobe and left upper lobe noted from vasogenic edema.  There is persistent mass effect and approximately 6 mm of left-to-right midline shift.  Trace blood products are noted in the left frontal lobe resection cavity consistent with recent postsurgical change.  Please see above for details.    LOCATION:  [place]   Dictated by (CST): Jez Agustin MD on 2025 at 4:26 AM     Finalized by (CST): Jez Agustin MD on 2025 at 4:38 AM       Lab Review     Recent Labs   Lab  04/14/25  0613 04/15/25  0757 04/17/25  0404    141 141   K 4.7 4.4 4.0    109 110   CO2 24.0 23.0 20.0*   * 129* 132*   BUN 24* 27* 25*   CREATSERUM 1.17* 1.21* 0.99     Recent Labs   Lab 04/14/25  0613 04/15/25  0757 04/17/25  0404   WBC 12.1* 13.0* 17.2*   HGB 12.1 12.2 11.6*   .0 195.0 178.0     Assesment/Plan:   75 year old female with a h/o htn, hl, ckd, bladder ca now s/p crani for tumor resection 4/16. Pt admitted 4/10 w/AMS and w/u revealed ct/mri brain with L frontal enhancing mass with assoc vasogenic edema c/f neoplastic process thus started on steroids and decision made to proceed with crani for resection 4/16     Neuro:  L frontal brain mass- c/f neoplastic process, particularly given h/o bladder ca.   Now s/p crani for resection, postop per Neurosurg.  Cont neurochecks/pt/ot/st.  Cerebral edema- 2/2 above  Cont steroids  Seizure- 2/2 above.   Will check vEEG and cont keppra for seizure prophylaxis.  Cardiac:  Htn/hl- sbp goal 100-150, cont statin  Pulmonary:  Stable on RA  Renal:  CKD- IVFs, monitor BUN/Cr  GI:  PO as tolerated  Heme/ID:  Afebrile, trend leukocytosis  Endocrine/Rheum:  Monitor glucose, sliding scale insulin prn  DVT Prophylaxis:  SCD’s    Goals of the Day: neurochecks, vEEG   Upon my evaluation, this patient had a high probability of imminent or life-threatening deterioration due to central nervous system failure,brain mass, seizure which required my direct attention, intervention, and personal management.     I have personally provided 80 minutes of critical care time, exclusive of time spent on separately billable procedures.  Time includes review of all pertinent laboratory/radiology results, discussion with consultants, and monitoring for potential decompensation.  Performed interventions included  steroids for cerebral edema, vEEG and AED for seizure .      Thank you for allowing me to participate in the care of this patient.      Mansoor Mccauley MD,  [facility]  Medical Director of System Neurosciences  Chief, Section of Neurocritical Care  [facility]           [1]   Current Facility-Administered Medications   Medication Dose Route Frequency    LORazepam (Ativan) 2 mg/mL injection 2 mg  2 mg Intravenous Q15 Min PRN    glucose (Dex4) 15 GM/59ML oral liquid 15 g  15 g Oral Q15 Min PRN    Or    glucose (Glutose) 40% oral gel 15 g  15 g Oral Q15 Min PRN    Or    glucose-vitamin C (Dex-4) chewable tab 4 tablet  4 tablet Oral Q15 Min PRN    Or    dextrose 50% injection 50 mL  50 mL Intravenous Q15 Min PRN    Or    glucose (Dex4) 15 GM/59ML oral liquid 30 g  30 g Oral Q15 Min PRN    Or    glucose (Glutose) 40% oral gel 30 g  30 g Oral Q15 Min PRN    Or    glucose-vitamin C (Dex-4) chewable tab 8 tablet  8 tablet Oral Q15 Min PRN    LORazepam (Ativan) 2 mg/mL injection        levETIRAcetam (Keppra) 500 mg/5mL injection 1,000 mg  1,000 mg Intravenous Q12H    sodium chloride 0.9% infusion   Intravenous Continuous    HYDROcodone-acetaminophen (Norco) 5-325 MG per tab 1 tablet  1 tablet Oral Q6H PRN    morphINE PF 2 MG/ML injection 2 mg  2 mg Intravenous Q2H PRN    acetaminophen (Tylenol) tab 650 mg  650 mg Oral Q4H PRN    niCARdipine in sodium chloride 0.86% (carDENE) 20 mg/200mL infusion premix  5-15 mg/hr Intravenous Continuous PRN    labetalol (Trandate) 5 mg/mL injection 10 mg  10 mg Intravenous Q10 Min PRN    hydrALAzine (Apresoline) 20 mg/mL injection 10 mg  10 mg Intravenous Q1H PRN    ondansetron (Zofran) 4 MG/2ML injection 4 mg  4 mg Intravenous Q6H PRN    dexamethasone (Decadron) 4 MG/ML injection 4 mg  4 mg Intravenous Q6H    [START ON 4/18/2025] dexamethasone (Decadron) tab 4 mg  4 mg Oral 4 times per day    Followed by    [START ON 4/19/2025] dexamethasone (Decadron) tab 4 mg  4 mg Oral Q8H OFE    Followed by    [START ON 4/23/2025] dexamethasone (Decadron) tab 4 mg  4 mg Oral 2 times per day    Followed by    [START ON  4/27/2025] dexamethasone (Decadron) tab 2 mg  2 mg Oral 2 times per day    famotidine (Pepcid) tab 20 mg  20 mg Oral Daily    Or    famotidine (Pepcid) 20 mg/2mL injection 20 mg  20 mg Intravenous Daily    atorvastatin (Lipitor) tab 20 mg  20 mg Oral Nightly    [Held by provider] heparin (Porcine) 5000 UNIT/ML injection 5,000 Units  5,000 Units Subcutaneous 2 times per day    buPROPion SR (WELLBUTRIN SR) 12 hr tab 150 mg  150 mg Oral Daily    busPIRone (Buspar) tab 5 mg  5 mg Oral BID    metoprolol tartrate (Lopressor) tab 25 mg  25 mg Oral BID

## 2025-04-17 NOTE — PROGRESS NOTES
[facility]  Neurosurgery Progress Note    Sujey Medina Patient Status:  Inpatient    3/6/1950 MRN [id number]   Location [facility] 7NE-A Attending Xavier Tang, DO   Hosp Day # 7 PCP Rajni Tiwari MD     Chief Complaint:  Left frontal Brain mass POD #1 L crani for tumor rsxn    Subjective:  Patient with post-operative 90 sec seizure last night with whole body shaking and arm contraction. Given 2mg Ativan and loaded with Keppra. CT head with expected post-operative changes    Objective/Physical Exam:    Vital Signs:  Blood pressure 131/70, pulse 65, temperature 96.6 °F (35.9 °C), temperature source Temporal, resp. rate 18, height 5' 5\" (1.651 m), weight 149 lb 4 oz (67.7 kg), SpO2 100%, not currently breastfeeding.  Respiratory:  nonlabored  CV:  well perfused  General: NAD, Resting in bed  Neurologic:   Sleepy this morning, arousable to voice  States incorrect name, not oriented to time or location  PERRL, EOMi, FS, TM  Follows commands with full strength x 4    Cranial incision is c/d/i    Labs:  Recent Labs   Lab 04/10/25  2025 04/11/25  0533 25  0526 25  0613 04/15/25  0757 25  0404   RBC 3.93 3.71*   < > 3.65* 3.66* 3.55*   HGB 13.1 12.4   < > 12.1 12.2 11.6*   HCT 39.2 36.3   < > 35.6 35.1 34.5*   MCV 99.7 97.8   < > 97.5 95.9 97.2   MCH 33.3 33.4   < > 33.2 33.3 32.7   MCHC 33.4 34.2   < > 34.0 34.8 33.6   RDW 13.0 13.1   < > 13.0 13.1 13.4   NEPRELIM 8.44* 7.98*  --   --   --  14.28*   WBC 11.8* 9.2   < > 12.1* 13.0* 17.2*   .0 180.0   < > 198.0 195.0 178.0    < > = values in this interval not displayed.       Recent Labs   Lab 25  0613 04/15/25  0757 25  0404   * 129* 132*   BUN 24* 27* 25*   CREATSERUM 1.17* 1.21* 0.99   EGFRCR 48* 46* 59*   CA 9.2 9.1 7.3*    141 141   K 4.7 4.4 4.0    109 110   CO2 24.0 23.0 20.0*       Imaging:  CT BRAIN OR HEAD (CPT=70450)  Result Date: 2025  CONCLUSION:  Interval postsurgical changes  of left frontal craniotomy for left frontal lobe mass resection.  Postsurgical changes are noted as detailed above.  Persistent hyperdensity within the left frontal lobe and left upper lobe noted from vasogenic edema.  There is persistent mass effect and approximately 6 mm of left-to-right midline shift.  Trace blood products are noted in the left frontal lobe resection cavity consistent with recent postsurgical change.  Please see above for details.    LOCATION:  [place]   Dictated by (CST): Jez Agustin MD on 4/17/2025 at 4:26 AM     Finalized by (CST): Jez Agustin MD on 4/17/2025 at 4:38 AM       CT CHEST+ABDOMEN+PELVIS(ALL CNTRST ONLY)(MCC=34461/49126)  Result Date: 4/11/2025  CONCLUSION:   No acute process or evidence of malignancy within the chest, abdomen, or pelvis.    LOCATION:  [place]    Dictated by (CST): Mir Gruber MD on 4/11/2025 at 1:20 PM     Finalized by (CST): Mir Gruber MD on 4/11/2025 at 1:34 PM       MRI BRAIN (W+WO) (CPT=70553)  Result Date: 4/10/2025  CONCLUSION:  1. Enhancing mass in the left frontal lobe with marked adjacent vasogenic edema as described above is concerning for primary CNS neoplasm   LOCATION:  [place]    Dictated by (CST): Eduardo Alejandra MD on 4/10/2025 at 11:14 PM     Finalized by (CST): Eduardo Alejnadra MD on 4/10/2025 at 11:17 PM       XR CHEST AP PORTABLE  (CPT=71045)  Result Date: 4/10/2025  CONCLUSION:  New right chest port.  Elevation of the right hemidiaphragm with small right effusion is noted.   LOCATION:  [place]      Dictated by (CST): Eduardo Alejandra MD on 4/10/2025 at 8:42 PM     Finalized by (CST): Eduardo Alejandra MD on 4/10/2025 at 8:43 PM       CT BRAIN OR HEAD (CPT=70450)  Result Date: 4/10/2025  CONCLUSION:  Significant vasogenic edema in left frontal lobe likely due to an underlying mass lesion which could represent a primary glioma of the brain versus metastatic lesion.  Also within the differential would be infection.  Further evaluation using MRI  brain with gadolinium is recommended.    LOCATION:  [place]   Dictated by (CST): Ivan Christian MD on 4/10/2025 at 8:27 PM     Finalized by (CST): Ivan Christian MD on 4/10/2025 at 8:30 PM            Assessment:  74 yo female with history of bladder cancer with left frontal lobe mass s/p left frontal craniotomy for tumor rsxn 4/16/25    Plan:  -Optimization per NCC  -Seizure management per NCC/Neurology  -SBP goal 100-150  -Post-op MRI brain wwo today  -PT/OT/OOB once deemed appropriate by primary team  -Decadron taper as ordered over 2 weeks down to Decadron 2mg BID. GI ppx while on Decadron   -Follow up pathology, appreciate Onc recs    Discussed with Dr. Daigle      Is this a shared or split note between Advanced Practice Provider and Physician? Yes     [facility]  4/17/2025 8:03 AM     Patient seen examined with PA  Case discussed  Seizure overnight  Treated with Keppra  Sleepy but arouses  Follows commands x 4  Starting at swelling around left eye  Await MRI  Continue steroids  Spoke with neurocritical care  Following

## 2025-04-17 NOTE — DIETARY NOTE
[facility]   part of [facility]    NUTRITION ASSESSMENT    Pt does not meet malnutrition criteria at this time.      NUTRITION INTERVENTION:    Meal and Snacks - Monitor and encourage adequate PO intake.   Vitamin and Mineral Supplements - adding Multivitamin with minerals      PATIENT STATUS: 04/17/25 75/F admitted with brain mass. PMH includes HLD, HTN, CKD, Bladder CA.  Pt POD#1 for Crani for tumor r/s. Post op course c/b sz this AM.  Pt sleepy today, has not had anything to eat.  Prior to surgery was eating well - % of good sized, protein containing meals. No current n/v/d. Last BM 4/14. No wt loss per EMR review.  RD will monitor PO intake and support as appropriate      ANTHROPOMETRICS:  Ht: 165.1 cm (5' 5\")  Wt: 67.7 kg (149 lb 4 oz).   BMI: Body mass index is 24.84 kg/m².  IBW: 56.8 kg      WEIGHT HISTORY:   Weight loss: No    Wt Readings from Last 10 Encounters:   04/17/25 67.7 kg (149 lb 4 oz)   04/16/25 66.2 kg (145 lb 15.1 oz)   11/02/22 56.4 kg (124 lb 6.4 oz)   10/22/22 56 kg (123 lb 7.3 oz)   10/22/22 56 kg (123 lb 7.3 oz)   10/19/22 61.2 kg (135 lb)   08/29/22 62.6 kg (138 lb)   12/13/21 63.2 kg (139 lb 6 oz)   11/30/21 63.5 kg (140 lb)   10/26/21 63 kg (139 lb)        NUTRITION:  Diet:       Procedures    Regular/General diet Is Patient on Accuchecks? No      Food Allergies: No  Cultural/Ethnic/Quaker Preferences Addressed: Yes    Percent Meals Eaten (last 3 days)       Date/Time Percent Meals Eaten (%)    04/14/25 0900 100 %    04/14/25 1300 100 %    04/15/25 0730 100 %    04/15/25 1330 100 %    04/16/25 2000 90 %            GI system review: WNL  Last BM 4/14  Skin and wounds: Surgical wound to head, skin tear to arm    NUTRITION RELATED PHYSICAL FINDINGS:     1. Body Fat/Muscle Mass: unable to assess     2. Fluid Accumulation: none per RN documentation    NUTRITION PRESCRIPTION:  67.7 kg Actual Body Weight  Calories: 6326-8084 calories/day (25-30 kcal/kg)  Protein:  grams  protein/day (1.2-1.5 grams protein per kg)  Fluid: ~1 ml/kcal or per MD discretion    NUTRITION DIAGNOSIS/PROBLEM:  Inadequate oral intake related to physiological causes as evidenced by documented/reported insufficient oral intake      MONITOR AND EVALUATE/NUTRITION GOALS:  PO intake of 75% of meals TID - New  Weight stable within 1 to 2 lbs during admission - New  Provide nutrition adequate for wound healing - New      MEDICATIONS:   Decadron, Pepcid, 0.9% @ 80    LABS:  Reviewed    Pt is at Moderate nutrition risk    Nikki Joy RD, LDN, [facility]  Clinical Dietitian  Phone [phone]

## 2025-04-18 ENCOUNTER — APPOINTMENT (OUTPATIENT)
Dept: MRI IMAGING | Facility: HOSPITAL | Age: 75
End: 2025-04-18
Attending: PHYSICIAN ASSISTANT
Payer: MEDICARE

## 2025-04-18 LAB
ALBUMIN SERPL-MCNC: 3.3 G/DL (ref 3.2–4.8)
ALBUMIN/GLOB SERPL: 1.5 {RATIO} (ref 1–2)
ALP LIVER SERPL-CCNC: 51 U/L (ref 55–142)
ALT SERPL-CCNC: 14 U/L (ref 10–49)
ANION GAP SERPL CALC-SCNC: 8 MMOL/L (ref 0–18)
AST SERPL-CCNC: 21 U/L (ref ?–34)
BASOPHILS # BLD: 0 X10(3) UL (ref 0–0.2)
BASOPHILS NFR BLD: 0 %
BILIRUB SERPL-MCNC: 0.4 MG/DL (ref 0.2–1.1)
BUN BLD-MCNC: 17 MG/DL (ref 9–23)
CALCIUM BLD-MCNC: 7.6 MG/DL (ref 8.7–10.6)
CHLORIDE SERPL-SCNC: 110 MMOL/L (ref 98–112)
CO2 SERPL-SCNC: 22 MMOL/L (ref 21–32)
CREAT BLD-MCNC: 0.9 MG/DL (ref 0.55–1.02)
EGFRCR SERPLBLD CKD-EPI 2021: 66 ML/MIN/1.73M2 (ref 60–?)
EOSINOPHIL # BLD: 0 X10(3) UL (ref 0–0.7)
EOSINOPHIL NFR BLD: 0 %
ERYTHROCYTE [DISTWIDTH] IN BLOOD BY AUTOMATED COUNT: 13.7 %
GLOBULIN PLAS-MCNC: 2.2 G/DL (ref 2–3.5)
GLUCOSE BLD-MCNC: 108 MG/DL (ref 70–99)
GLUCOSE BLD-MCNC: 116 MG/DL (ref 70–99)
GLUCOSE BLD-MCNC: 119 MG/DL (ref 70–99)
GLUCOSE BLD-MCNC: 120 MG/DL (ref 70–99)
GLUCOSE BLD-MCNC: 121 MG/DL (ref 70–99)
GLUCOSE BLD-MCNC: 124 MG/DL (ref 70–99)
HCT VFR BLD AUTO: 34.4 % (ref 35–48)
HGB BLD-MCNC: 11.3 G/DL (ref 12–16)
LYMPHOCYTES NFR BLD: 0.51 X10(3) UL (ref 1–4)
LYMPHOCYTES NFR BLD: 4 %
MCH RBC QN AUTO: 33.1 PG (ref 26–34)
MCHC RBC AUTO-ENTMCNC: 32.8 G/DL (ref 31–37)
MCV RBC AUTO: 100.9 FL (ref 80–100)
MONOCYTES # BLD: 0.77 X10(3) UL (ref 0.1–1)
MONOCYTES NFR BLD: 6 %
MORPHOLOGY: NORMAL
NEUTROPHILS # BLD AUTO: 10.38 X10 (3) UL (ref 1.5–7.7)
NEUTROPHILS NFR BLD: 87 %
NEUTS BAND NFR BLD: 3 %
NEUTS HYPERSEG # BLD: 11.52 X10(3) UL (ref 1.5–7.7)
OSMOLALITY SERPL CALC.SUM OF ELEC: 293 MOSM/KG (ref 275–295)
PLATELET # BLD AUTO: 151 10(3)UL (ref 150–450)
PLATELET MORPHOLOGY: NORMAL
POTASSIUM SERPL-SCNC: 4.4 MMOL/L (ref 3.5–5.1)
PROT SERPL-MCNC: 5.5 G/DL (ref 5.7–8.2)
RBC # BLD AUTO: 3.41 X10(6)UL (ref 3.8–5.3)
SODIUM SERPL-SCNC: 140 MMOL/L (ref 136–145)
TOTAL CELLS COUNTED BLD: 100
WBC # BLD AUTO: 12.8 X10(3) UL (ref 4–11)

## 2025-04-18 PROCEDURE — 95720 EEG PHY/QHP EA INCR W/VEEG: CPT | Performed by: OTHER

## 2025-04-18 PROCEDURE — 99291 CRITICAL CARE FIRST HOUR: CPT | Performed by: INTERNAL MEDICINE

## 2025-04-18 PROCEDURE — 70553 MRI BRAIN STEM W/O & W/DYE: CPT | Performed by: PHYSICIAN ASSISTANT

## 2025-04-18 RX ORDER — GADOTERATE MEGLUMINE 376.9 MG/ML
20 INJECTION INTRAVENOUS
Status: COMPLETED | OUTPATIENT
Start: 2025-04-18 | End: 2025-04-18

## 2025-04-18 RX ORDER — LEVETIRACETAM 500 MG/1
1000 TABLET ORAL 2 TIMES DAILY
Status: DISCONTINUED | OUTPATIENT
Start: 2025-04-18 | End: 2025-04-20

## 2025-04-18 NOTE — PROGRESS NOTES
Oncology Progress note     Bladder cancer on treatment with enfortumab    Now noted to have a brain mass after she presented with MS changes.  Hx of Stage IV bladder cancer with known lung metastases--s/p cystectomy with ileal conduit and wedge resection of TONNY nodules.      HPI:  Surgery went well Wednesday.   She had image guided craniotomy on L side.  Operative notes reviewed--highly vascular tumor noted.  Transferred to CN ICU post operatively    Awake today and recognizes me.    Had a seizure first night post op x 90 secs and has been started on Keppra  CT brain last night with postsurgical changes and vasogenic edema.  Post op MRI planned today per neurosurgery  Decadron taper over 2 weeks down to 2 bid is planned.  Path remains pending.    CT c/a/p with no clear progressive disease noted earlier in admission.    No new complaints  Denying pain     I have reviewed case with Rns  She was on Heparin subcutaneous 5000 bid--this has been held pre operatively.    Past Medical History[1]  Social Hx on file[2]  Vitals:    04/18/25 0600   BP: 131/56   Pulse: 53   Resp: 14   Temp:      GEN: alert; nad.   HEENT: normocephalic  HEART: regular rate  LUNGS: normal breathing effort   eXT: no edema    Current Hospital Medications[3]       A/P:  75 year old with metastatic bladder cancer currently on enfortumab, here with new brain mass, cerebral edema    Brain mass  -presented with confusion  -MRI shows left frontal lobe mass, measuring 5.7 cm with vasogenic edema.   She is now s/p resection and being monitored in CN ICU.  Dex taper schedule noted. Pathology pending.  On Pepcid  CT c/a/p does not show any acute findings  MRI brain planned today.    2. Metastatic bladder cancer  -on treatment with enfortumab  -? Brain mets vs new cns primary--now post brain resection.  Path pending.    3. DVT prophylaxis--on heparin 5000 bid.   This will need to be held perioperatively.  She will need calf compressors and will review with  NRSG as to timing of resuming pharmacologic prophylaxis.      PLAN:  -continue on dexamethasone for now, currently on 4 mg q 6 hours--tapering plans noted.  Now on Keppra post seizure.  - POD 2 today.  Await final pathology  -will need radiation oncology--can be arranged as outpatient, pending clinical course  -outpatient follow up with Dr. Woody after discharge  -discussed with patient and Rns today  -continue on Pepcid for now    Query when/should heparin DVT prophylaxis be resumed.      Fareed Khanai  [phone]         [1]   Past Medical History:   Cancer (HCC)    CKD (chronic kidney disease), stage III (HCC)    HYPERLIPIDEMIA    Hypertension    OSTEOPENIA    Personal history of bladder cancer    S/P ileoconduit   [2]   Social History  Socioeconomic History    Marital status:     Number of children: 0   Occupational History    Occupation: Semi retired    Tobacco Use    Smoking status: Never    Smokeless tobacco: Never   Vaping Use    Vaping status: Never Used   Substance and Sexual Activity    Alcohol use: Not Currently     Alcohol/week: 2.0 standard drinks of alcohol     Types: 2 Standard drinks or equivalent per week    Drug use: No   Other Topics Concern    Seat Belt Yes   [3]   LORazepam    glucose **OR** glucose **OR** glucose-vitamin C **OR** dextrose **OR** glucose **OR** glucose **OR** glucose-vitamin C    levETIRAcetam    sodium chloride    buPROPion SR    HYDROcodone-acetaminophen    morphINE    acetaminophen    niCARdipine    labetalol    hydrALAzine    ondansetron    dexamethasone **FOLLOWED BY** [START ON 4/19/2025] dexamethasone **FOLLOWED BY** [START ON 4/23/2025] dexamethasone **FOLLOWED BY** [START ON 4/27/2025] dexamethasone    famotidine **OR** famotidine    atorvastatin    [Held by provider] heparin    busPIRone    metoprolol tartrate

## 2025-04-18 NOTE — PROGRESS NOTES
[facility]  Neurocritical Care Progress Note     Sujey Medina Patient Status:  Inpatient    3/6/1950 MRN [id number]   Location [facility] 6NE-A Attending Tessa Goodman MD   Hosp Day # 8 PCP Rajni Tiwari MD     Subjective:     No acute events overnight. Speech much improved today per  and pt.    Vitals:   Temp:  [97.2 °F (36.2 °C)-98.8 °F (37.1 °C)] 97.2 °F (36.2 °C)  Pulse:  [49-95] 55  Resp:  [12-24] 17  BP: (103-134)/(53-80) 134/64  SpO2:  [95 %-100 %] 99 %  Body mass index is 24.8 kg/m².    Intake/Output:    Intake/Output Summary (Last 24 hours) at 2025 1014  Last data filed at 2025 0800  Gross per 24 hour   Intake 2209 ml   Output 2525 ml   Net -316 ml     Current Meds:  Current Hospital Medications[1]  Physical Examination:   General- No acute distress  CV- RRR  Resp- CTA Bilat  Neuro-  Mental status- awake and alert, regards and follows most commands, speech significantly improved today, more fluent, naming 2/3, repetition intact  Cranial nerves- pupils equally round and reactive to light, extraocular muscles intact, face symmetric  Motor- clements L>R  Sens-  Intact to light touch    Diagnostics:   No results found.    Lab Review     Recent Labs   Lab 04/15/25  0757 04/17/25  0404 04/18/25  0228    141 140   K 4.4 4.0 4.4    110 110   CO2 23.0 20.0* 22.0   * 132* 121*   BUN 27* 25* 17   CREATSERUM 1.21* 0.99 0.90     Recent Labs   Lab 04/15/25  0757 04/17/25  04025   WBC 13.0* 17.2* 12.8*   HGB 12.2 11.6* 11.3*   .0 178.0 151.0     Assesment/Plan:   75 year old female with a h/o htn, hl, ckd, bladder ca now s/p crani for tumor resection . Pt admitted 4/10 w/AMS and w/u revealed ct/mri brain with L frontal enhancing mass with assoc vasogenic edema c/f neoplastic process thus started on steroids and decision made to proceed with crani for resection      Neuro:  L frontal brain mass- c/f neoplastic process, particularly  given h/o bladder ca.   Now s/p crani for resection, postop per Neurosurg.  Cont neurochecks/pt/ot/st.  Cerebral edema- 2/2 above  Cont steroids  Seizure- 2/2 above.   vEEG neg for seizures, thus ok to dc.   Cont keppra for seizure prophylaxis until at least clinic follow up.  No further neurocritical care needs at this time, further management and outpt follow-up per Neurosurg, will follow peripherally please call with any questions.   Cardiac:  Htn/hl- sbp goal 100-150, cont statin  Pulmonary:  Stable on RA  Renal:  CKD- IVFs, monitor BUN/Cr  GI:  PO as tolerated  Heme/ID:  Afebrile, trend leukocytosis  Endocrine/Rheum:  Monitor glucose, sliding scale insulin prn  DVT Prophylaxis:  SCD’s    Goals of the Day: neurochecks  Upon my evaluation, this patient had a high probability of imminent or life-threatening deterioration due to central nervous system failure,brain mass, seizure which required my direct attention, intervention, and personal management.     I have personally provided 40 minutes of critical care time, exclusive of time spent on separately billable procedures.  Time includes review of all pertinent laboratory/radiology results, discussion with consultants, and monitoring for potential decompensation.  Performed interventions included  steroids for cerebral edema, vEEG and AED for seizure .      Thank you for allowing me to participate in the care of this patient.      Mansoor Mccauley MD, [facility]  Medical Director of System Neurosciences  Chief, Section of Neurocritical Care  [facility] [place]           [1]   Current Facility-Administered Medications   Medication Dose Route Frequency    LORazepam (Ativan) 2 mg/mL injection 2 mg  2 mg Intravenous Q15 Min PRN    glucose (Dex4) 15 GM/59ML oral liquid 15 g  15 g Oral Q15 Min PRN    Or    glucose (Glutose) 40% oral gel 15 g  15 g Oral Q15 Min PRN    Or    glucose-vitamin C (Dex-4) chewable tab 4 tablet  4 tablet Oral Q15 Min  PRN    Or    dextrose 50% injection 50 mL  50 mL Intravenous Q15 Min PRN    Or    glucose (Dex4) 15 GM/59ML oral liquid 30 g  30 g Oral Q15 Min PRN    Or    glucose (Glutose) 40% oral gel 30 g  30 g Oral Q15 Min PRN    Or    glucose-vitamin C (Dex-4) chewable tab 8 tablet  8 tablet Oral Q15 Min PRN    levETIRAcetam (Keppra) 500 mg/5mL injection 1,000 mg  1,000 mg Intravenous Q12H    buPROPion SR (WELLBUTRIN SR) 12 hr tab 200 mg  200 mg Oral Daily    HYDROcodone-acetaminophen (Norco) 5-325 MG per tab 1 tablet  1 tablet Oral Q6H PRN    morphINE PF 2 MG/ML injection 2 mg  2 mg Intravenous Q2H PRN    acetaminophen (Tylenol) tab 650 mg  650 mg Oral Q4H PRN    niCARdipine in sodium chloride 0.86% (carDENE) 20 mg/200mL infusion premix  5-15 mg/hr Intravenous Continuous PRN    labetalol (Trandate) 5 mg/mL injection 10 mg  10 mg Intravenous Q10 Min PRN    hydrALAzine (Apresoline) 20 mg/mL injection 10 mg  10 mg Intravenous Q1H PRN    ondansetron (Zofran) 4 MG/2ML injection 4 mg  4 mg Intravenous Q6H PRN    dexamethasone (Decadron) tab 4 mg  4 mg Oral 4 times per day    Followed by    [START ON 4/19/2025] dexamethasone (Decadron) tab 4 mg  4 mg Oral Q8H OFE    Followed by    [START ON 4/23/2025] dexamethasone (Decadron) tab 4 mg  4 mg Oral 2 times per day    Followed by    [START ON 4/27/2025] dexamethasone (Decadron) tab 2 mg  2 mg Oral 2 times per day    famotidine (Pepcid) tab 20 mg  20 mg Oral Daily    Or    famotidine (Pepcid) 20 mg/2mL injection 20 mg  20 mg Intravenous Daily    atorvastatin (Lipitor) tab 20 mg  20 mg Oral Nightly    [Held by provider] heparin (Porcine) 5000 UNIT/ML injection 5,000 Units  5,000 Units Subcutaneous 2 times per day    busPIRone (Buspar) tab 5 mg  5 mg Oral BID    metoprolol tartrate (Lopressor) tab 25 mg  25 mg Oral BID

## 2025-04-18 NOTE — CM/SW NOTE
04/18/25 1200   CM/SW Referral Data   Referral Source Physician   Reason for Referral Discharge planning   Informant Patient;Spouse/Significant Other   Medical Hx   Does patient have an established PCP? Yes   Patient Info   Patient's Current Mental Status at Time of Assessment Alert;Oriented   Patient's Home Environment Condo/Apt with elevator   Number of Levels in Home 1   Patient lives with Spouse/Significant other   Patient Status Prior to Admission   Independent with ADLs and Mobility No   Pt. requires assistance with Driving   Discharge Needs   Anticipated D/C needs Home health care;To be determined     Patient presented to Emergency Room with altered mental status and diagnosed with brain mass.  Patient admitted to CNICU for monitoring s/p craniotomy procedure.  CM spoke to patient and spouse Venkatesh at bedside for needs assessment; patient and spouse live in a ground floor apartment with underground parking.  Patient stated she is mostly independent and does not drive often prior to admission (spouse Venkatesh stated patient does not drive).  CM discussed home health therapy services with patient/spouse for post-acute care; patient undecided on services at this time.  CM offered to send HH referral and provide agency choice list for patient to decide later.  Venkatesh inquired about HH Speech therapy; CM will request  Speech therapy order to  include with referral.      SW/CM available for further discharge planning needs.   Nathalie Bond, RN Case Manager [phone]

## 2025-04-18 NOTE — OCCUPATIONAL THERAPY NOTE
OCCUPATIONAL THERAPY EVALUATION - INPATIENT     Room Number: 6620/6620-A  Evaluation Date: 4/18/2025  Type of Evaluation: Initial  Presenting Problem: 4/16 L crani for tumor rsxn    Physician Order: IP Consult to Occupational Therapy  Reason for Therapy: ADL/IADL Dysfunction and Discharge Planning    OCCUPATIONAL THERAPY ASSESSMENT   Patient is currently functioning below baseline with toileting, bathing, lower body dressing, grooming, bed mobility, and transfers. Prior to admission, patient's baseline is independent/supervision.  Patient is requiring minimum assistance as a result of the following impairments: impaired standing dynamic balance, impaired motor planning, cognitive deficits (multi-step commands, EF, expressive/receptive aphasia), medical status, decreased insight to deficits, and decreased safety awareness. Occupational Therapy will continue to follow for duration of hospitalization.    Patient will benefit from continued skilled OT Services with no additional skilled services but increased support at home    History Related to Current Admission: Patient is a 75 year old female admitted on 4/10/2025 with Presenting Problem: 4/16 L crani for tumor rsxn. Co-Morbidities : Bladder CA with lung mets, depression, HTN, HL, osteopenia, CKDIII    WEIGHT BEARING RESTRICTION       Recommendations for nursing staff:   Transfers: x1   Toileting location: bathroom    EVALUATION SESSION:  Patient Start of Session: bed    FUNCTIONAL TRANSFER ASSESSMENT  Sit to Stand: Edge of Bed  Edge of Bed: Contact Guard Assist  Chair: Not Tested  Toilet Transfer: Contact Guard Assist    BED MOBILITY  Supine to Sit : Stand-by Assist  Scooting: CGA    BALANCE ASSESSMENT  Static Sitting: Independent  Static Standing: Supervision    FUNCTIONAL ADL ASSESSMENT  LB Dressing Seated: Supervision  LB Dressing Standing: Contact Guard Assist  Toileting Standing: Contact Guard Assist    ACTIVITY TOLERANCE: good                         O2  SATURATIONS       COGNITION  Overall Cognitive Status:  Impaired  Attention Span:  attends with cues to redirect, difficulty attending to directions, and difficulty dividing attention  Orientation Level:  oriented to place, oriented to person, disoriented to time, and disoriented to situation  Following Commands:  follows multi-step commands inconsistently and follows one step commands with repetition  Motor Planning: impaired  Awareness of Errors:  assistance required to identify errors made and assistance required to correct errors made  Awareness of Deficits:  decreased awareness of deficits    Upper Extremity   ROM: within functional limits   Strength: within functional limits   Coordination  Gross motor: intact  Fine motor: intact  Sensation: Light touch:  intact    EDUCATION PROVIDED  Patient Education : Plan of Care; Role of Occupational Therapy; Functional Transfer Techniques; Posture/Positioning; Energy Conservation; Proper Body Mechanics  Patient's Response to Education: Demonstrates Poor Carry Over to Information; Returned Demonstration    Equipment used: none  Demonstrates functional use, Would benefit from additional trial      Therapist comments: Pt grossly SBA/CGA for functional mobility and ADLs this session. A&Ox2, unable to recall year. Noted to have some expressive and receptive aphasia, difficulty to follow multi-step commands. Decreased awareness of deficits. Gross and fine motor intact. Strength symmetrical and WFL.    Patient End of Session: Up in chair, Needs met, Call light within reach, All patient questions and concerns addressed, RN aware of session/findings, Family present, Hospital anti-slip socks    OCCUPATIONAL PROFILE    HOME SITUATION  Type of Home: Condo  Home Layout: One level, Elevator  Lives With: Spouse                     Drives: Yes  Patient Regularly Uses: None    Prior Level of Function: independent    SUBJECTIVE   Calm and cooperative     PAIN ASSESSMENT  Ratin           OBJECTIVE  Precautions: Bed/chair alarm  Fall Risk: Standard fall risk    ASSESSMENTS    AM-PAC ‘6-Clicks’ Inpatient Daily Activity Short Form  -   Putting on and taking off regular lower body clothing?: A Little  -   Bathing (including washing, rinsing, drying)?: A Little  -   Toileting, which includes using toilet, bedpan or urinal? : A Little  -   Putting on and taking off regular upper body clothing?: None  -   Taking care of personal grooming such as brushing teeth?: None  -   Eating meals?: None    AM-PAC Score:  Score: 21  Approx Degree of Impairment: 32.79%  Standardized Score (AM-PAC Scale): 44.27    ADDITIONAL TESTS     NEUROLOGICAL FINDINGS      COGNITION ASSESSMENTS     PLAN     OT Treatment Plan: Balance activities, ADL training, Functional transfer training, Patient/Family training, Patient/Family education, Compensatory technique education, Cognitive reorientation  Rehab Potential : Fair  Frequency: 3x/week  Number of Visits to Meet Established Goals: 3    ADL Goals   Patient will perform lower body dressing:  with supervision  Patient will perform toileting: with supervision    Functional Transfer Goals  Patient will transfer to toilet:  with supervision    Therapist Goals  Pt will correctly follow multi-step commands 80% of time  Patient Evaluation Complexity Level:   Occupational Profile/Medical History LOW - Brief history including review of medical or therapy records    Specific performance deficits impacting engagement in ADL/IADL LOW  1 - 3 performance deficits    Client Assessment/Performance Deficits LOW - No comorbidities nor modifications of tasks    Clinical Decision Making LOW - Analysis of occupational profile, problem-focused assessments, limited treatment options    Overall Complexity LOW     OT Session Time: 25 minutes  Self-Care Home Management:  minutes  Therapeutic Activity: 15 minutes  Neuromuscular Re-education:  minutes  Therapeutic Exercise:  minutes  Cognitive Skills:   minutes  Sensory Integrative:  minutes  Orthotic Management and Training:  minutes  Can add/delete any of these

## 2025-04-18 NOTE — PHYSICAL THERAPY NOTE
Order received for PT eval and chart reviewed. Pt remains on continuous EEG. PT will continue to follow.

## 2025-04-18 NOTE — PROGRESS NOTES
[facility] Hospitalist Progress Note       SUBJECTIVE:  Pt awake   Aphasia improving somewhat able to tell me her name     OBJECTIVE:  Scheduled Meds: Scheduled Medications[1]  Continuous Infusions: Medication Infusions[2]  PRN Meds: PRN Medications[3]    Vitals  Vitals:    04/18/25 1000   BP: 134/64   Pulse: 55   Resp: 17   Temp:          Exam   Gen-    no acute distress, alert    RESP-   normal respiratory effort  CV-      Heart RRR, no mgr  Abd-    soft, nondistended, nontender, bowel sounds present  Neuro-  moving all extremities, following some commands     Labs:     Recent Labs   Lab 04/13/25  0533 04/14/25  0613 04/15/25  0757 04/17/25  0404 04/18/25  0228   WBC 13.5* 12.1* 13.0* 17.2* 12.8*   HGB 11.4* 12.1 12.2 11.6* 11.3*   MCV 96.5 97.5 95.9 97.2 100.9*   .0 198.0 195.0 178.0 151.0   BAND  --   --   --  2 3   INR  --   --  0.96  --   --        Recent Labs   Lab 04/12/25  0526 04/13/25  0533 04/14/25  0613 04/15/25  0757 04/17/25  0404 04/18/25  0228    141 142 141 141 140   K 5.5* 4.2 4.7 4.4 4.0 4.4    109 109 109 110 110   CO2 24.0 23.0 24.0 23.0 20.0* 22.0   BUN 19 26* 24* 27* 25* 17   CREATSERUM 1.28* 1.21* 1.17* 1.21* 0.99 0.90   CA 9.4 9.0 9.2 9.1 7.3* 7.6*   MG 2.2 2.1 2.2  --   --   --    PHOS 2.8 3.1 3.4  --   --   --    * 142* 135* 129* 132* 121*       Recent Labs   Lab 04/12/25  0526 04/13/25  0533 04/14/25 0613 04/18/25 0228   ALT  --   --   --  14   AST  --   --   --  21   ALB 3.9 3.7 3.8 3.3       Recent Labs   Lab 04/17/25  0546 04/17/25  1147 04/18/25  0011 04/18/25  0609 04/18/25  1108   PGLU 122* 104* 120* 119* 108*       AP:  75 year old female with PMH of stage IV bladder cancer with lung metastases s/p cystectomy with ileal conduit and wedge resection of TONNY nodules currently on Enfortumab, HTN, HLD, CKD3b, MDD presenting to the hospital for AMS     Left frontal lobe mass with vasogenic edema  Acute Metabolic Encephalopathy  -Imminent threat to neurologic function  given patient's altered mental status, could be primary CNS tumor versus metastases  -CT C/A/P: negative for other metastases     Plan:  - s/p left image guided craniotaomy for brain mass on 4/16, had 90 sec seizure, whole body shocking, was given ativan on 4/17, pt started on keppra   - steroids per NSG  - Per onc:  will need radiation oncology outpatient     Stage IV bladder cancer s/p cystectomy with ileal conduit  Hx of Lung metastases s/p wedge resection of TONNY nodules  Plan:  -Oncology consulted inpatient, recommending eventual radiation as outpatient     Pyuria  UTI/Acute Cystitis - ruled out  -Urostomy likely colonized, no sign/concerns of an acute infection at this time  -Per ID, no need for antibiotics     CKD stage IIIb  Hyperkalemia - resolved  -Serial RFP's     Hyperlipidemia  -Continue home statin     Depression  -Continue home bupropion, buspirone     Hypertension  -Continue home beta-blocker           Tessa Goodman MD                [1]    levETIRAcetam  1,000 mg Oral BID    buPROPion SR  200 mg Oral Daily    dexamethasone  4 mg Oral 4 times per day    Followed by    [START ON 4/19/2025] dexamethasone  4 mg Oral Q8H OFE    Followed by    [START ON 4/23/2025] dexamethasone  4 mg Oral 2 times per day    Followed by    [START ON 4/27/2025] dexamethasone  2 mg Oral 2 times per day    famotidine  20 mg Oral Daily    atorvastatin  20 mg Oral Nightly    [Held by provider] heparin  5,000 Units Subcutaneous 2 times per day    busPIRone  5 mg Oral BID    metoprolol tartrate  25 mg Oral BID   [2] [3]   LORazepam    glucose **OR** glucose **OR** glucose-vitamin C **OR** dextrose **OR** glucose **OR** glucose **OR** glucose-vitamin C    HYDROcodone-acetaminophen    morphINE    acetaminophen    labetalol    hydrALAzine    ondansetron

## 2025-04-18 NOTE — PROGRESS NOTES
[facility]  Neurosurgery Progress Note    Sujey Medina Patient Status:  Inpatient    3/6/1950 MRN [id number]   Location [facility] 7NE-A Attending Xavier Tang, DO   Hosp Day # 8 PCP Rajni Tiwari MD     Chief Complaint:  Left frontal Brain mass POD #2 L crani for tumor rsxn    Subjective:  Patient noted to have a couple episodes of self-limited hypotension. No further seizures noted overnight. Patient more alert this morning.     Objective/Physical Exam:    Vital Signs:  Blood pressure 112/75, pulse 71, temperature 97.6 °F (36.4 °C), temperature source Temporal, resp. rate 19, height 5' 5\" (1.651 m), weight 149 lb 0.5 oz (67.6 kg), SpO2 98%, not currently breastfeeding.  Respiratory:  nonlabored  CV:  well perfused  General: NAD, Resting in bed  Frontal swelling  Neurologic:   Awake, alert  Speech is more fluent this morning, conversant. Intermittent expressive aphasia  PERRL, EOMi, FS, TM  Follows commands with full strength x 4    Cranial incision is c/d/i    Labs:  Recent Labs   Lab 04/15/25  0757 04/17/25  04025   RBC 3.66* 3.55* 3.41*   HGB 12.2 11.6* 11.3*   HCT 35.1 34.5* 34.4*   MCV 95.9 97.2 100.9*   MCH 33.3 32.7 33.1   MCHC 34.8 33.6 32.8   RDW 13.1 13.4 13.7   NEPRELIM  --  14.28* 10.38*   WBC 13.0* 17.2* 12.8*   .0 178.0 151.0       Recent Labs   Lab 04/15/25  0757 25  0404 25   * 132* 121*   BUN 27* 25* 17   CREATSERUM 1.21* 0.99 0.90   EGFRCR 46* 59* 66   CA 9.1 7.3* 7.6*    141 140   K 4.4 4.0 4.4    110 110   CO2 23.0 20.0* 22.0       Imaging:  CT BRAIN OR HEAD (CPT=70450)  Result Date: 2025  CONCLUSION:  Interval postsurgical changes of left frontal craniotomy for left frontal lobe mass resection.  Postsurgical changes are noted as detailed above.  Persistent hyperdensity within the left frontal lobe and left upper lobe noted from vasogenic edema.  There is persistent mass effect and approximately 6 mm of  left-to-right midline shift.  Trace blood products are noted in the left frontal lobe resection cavity consistent with recent postsurgical change.  Please see above for details.    LOCATION:  [facility]   Dictated by (CST): Jez Agustin MD on 4/17/2025 at 4:26 AM     Finalized by (CST): Jez Agustin MD on 4/17/2025 at 4:38 AM       CT CHEST+ABDOMEN+PELVIS(ALL CNTRST ONLY)(TQU=90780/74914)  Result Date: 4/11/2025  CONCLUSION:   No acute process or evidence of malignancy within the chest, abdomen, or pelvis.    LOCATION:  [facility]    Dictated by (CST): Mir Gruber MD on 4/11/2025 at 1:20 PM     Finalized by (CST): Mir Gruber MD on 4/11/2025 at 1:34 PM       MRI BRAIN (W+WO) (CPT=70553)  Result Date: 4/10/2025  CONCLUSION:  1. Enhancing mass in the left frontal lobe with marked adjacent vasogenic edema as described above is concerning for primary CNS neoplasm   LOCATION:  [facility]    Dictated by (CST): Eduardo Alejandra MD on 4/10/2025 at 11:14 PM     Finalized by (CST): Eduardo Alejandra MD on 4/10/2025 at 11:17 PM       XR CHEST AP PORTABLE  (CPT=71045)  Result Date: 4/10/2025  CONCLUSION:  New right chest port.  Elevation of the right hemidiaphragm with small right effusion is noted.   LOCATION:  [facility]      Dictated by (CST): Eduardo Alejandra MD on 4/10/2025 at 8:42 PM     Finalized by (CST): Eduardo Alejandra MD on 4/10/2025 at 8:43 PM       CT BRAIN OR HEAD (CPT=70450)  Result Date: 4/10/2025  CONCLUSION:  Significant vasogenic edema in left frontal lobe likely due to an underlying mass lesion which could represent a primary glioma of the brain versus metastatic lesion.  Also within the differential would be infection.  Further evaluation using MRI brain with gadolinium is recommended.    LOCATION:  [facility]   Dictated by (CST): Ivan Christian MD on 4/10/2025 at 8:27 PM     Finalized by (CST): Ivan Christian MD on 4/10/2025 at 8:30 PM            Assessment:  76 yo female with history of bladder cancer with left frontal lobe  mass s/p left frontal craniotomy for tumor rsxn 4/16/25    Plan:  -Optimization per NCC/Hospitalist  -Ok to transfer to floor for q4h neurochecks once deemed appropriate by primary team  -Seizure management per NCC/Neurology  -SBP goal 100-150  -Post-op MRI brain wwo today  -PT/OT/OOB once deemed appropriate by primary team  -Decadron taper as ordered over 2 weeks down to Decadron 2mg BID. GI ppx while on Decadron   -Follow up pathology, appreciate Onc recs  -Dispo planning    Patient seen with Dr. Daigle    Is this a shared or split note between Advanced Practice Provider and Physician? Yes     [facility]  4/18/2025 9:38 AM   Patient seen examined with PA  Case discussed  She is doing really well  Follows commands x 4  Awake alert and appropriate  Currently getting EEG  Needs postoperative MRI  Spoke with neurocritical care  Okay for floor from surgical standpoint  PT/OT  Speech  Dispo planning

## 2025-04-18 NOTE — PLAN OF CARE
Pt A&O x4  RA   SB-NSR  VSS  Denies pain    I&O's monitored  Hep subcutaneous held per NSGY  Steroids cont. (Taper in progress)  PT/OT cont. (Rec HH)  IVF Dc'd   Neuros Q4 - See flowsheets  POC: MRI & Transfer to floor    Problem: Patient/Family Goals  Goal: Patient/Family Long Term Goal  Description: Patient's Long Term Goal: Return home with family, adequate resources, stable neurological status    Interventions:  - PT/OT, neuro checks per protocol  - See additional Care Plan goals for specific interventions  Outcome: Progressing  Goal: Patient/Family Short Term Goal  Description: Patient's Short Term Goal: Absence of seizures and complications    Interventions:   - Monitoring, scans per MD  - See additional Care Plan goals for specific interventions  Outcome: Progressing     Problem: PAIN - ADULT  Goal: Verbalizes/displays adequate comfort level or patient's stated pain goal  Description: INTERVENTIONS:- Encourage pt to monitor pain and request assistance- Assess pain using appropriate pain scale- Administer analgesics based on type and severity of pain and evaluate response- Implement non-pharmacological measures as appropriate and evaluate response- Consider cultural and social influences on pain and pain management- Manage/alleviate anxiety- Utilize distraction and/or relaxation techniques- Monitor for opioid side effects- Notify MD/LIP if interventions unsuccessful or patient reports new pain- Anticipate increased pain with activity and pre-medicate as appropriate  Outcome: Progressing     Problem: RISK FOR INFECTION - ADULT  Goal: Absence of fever/infection during anticipated neutropenic period  Description: INTERVENTIONS- Monitor WBC- Administer growth factors as ordered- Implement neutropenic guidelines  Outcome: Progressing     Problem: DISCHARGE PLANNING  Goal: Discharge to home or other facility with appropriate resources  Description: INTERVENTIONS:- Identify barriers to discharge w/pt and caregiver-  Include patient/family/discharge partner in discharge planning- Arrange for needed discharge resources and transportation as appropriate- Identify discharge learning needs (meds, wound care, etc)- Arrange for interpreters to assist at discharge as needed- Consider post-discharge preferences of patient/family/discharge partner- Complete POLST form as appropriate- Assess patient's ability to be responsible for managing their own health- Refer to Case Management Department for coordinating discharge planning if the patient needs post-hospital services based on physician/LIP order or complex needs related to functional status, cognitive ability or social support system  Outcome: Progressing     Problem: Altered Communication/Language Barrier  Goal: Patient/Family is able to understand and participate in their care  Description: Interventions:- Assess communication ability and preferred communication style- Implement communication aides and strategies- Use visual cues when possible- Listen attentively, be patient, do not interrupt- Minimize distractions- Allow time for understanding and response- Establish method for patient to ask for assistance (call light)- Provide an  as needed- Communicate barriers and strategies to overcome with those who interact with patient  Outcome: Progressing     Problem: SKIN/TISSUE INTEGRITY - ADULT  Goal: Skin integrity remains intact  Description: INTERVENTIONS- Assess and document risk factors for pressure ulcer development- Assess and document skin integrity- Monitor for areas of redness and/or skin breakdown- Initiate interventions, skin care algorithm/standards of care as needed  Outcome: Progressing  Goal: Incision(s), wounds(s) or drain site(s) healing without S/S of infection  Description: INTERVENTIONS:- Assess and document risk factors for pressure ulcer development- Assess and document skin integrity- Assess and document dressing/incision, wound bed, drain sites and  surrounding tissue- Implement wound care per orders- Initiate isolation precautions as appropriate- Initiate Pressure Ulcer prevention bundle as indicated  Outcome: Progressing     Problem: NEUROLOGICAL - ADULT  Goal: Achieves stable or improved neurological status  Description: INTERVENTIONS- Assess for and report changes in neurological status- Initiate measures to prevent increased intracranial pressure- Maintain blood pressure and fluid volume within ordered parameters to optimize cerebral perfusion and minimize risk of hemorrhage- Monitor temperature, glucose, and sodium. Initiate appropriate interventions as ordered  Outcome: Progressing  Goal: Absence of seizures  Description: INTERVENTIONS- Monitor for seizure activity- Administer anti-seizure medications as ordered- Monitor neurological status  Outcome: Progressing  Goal: Remains free of injury related to seizure activity  Description: INTERVENTIONS:- Maintain airway, patient safety  and administer oxygen as ordered- Monitor patient for seizure activity, document and report duration and description of seizure to MD/LIP- If seizure occurs, turn patient to side and suction secretions as needed- Reorient patient post seizure- Seizure pads on all 4 side rails- Instruct patient/family to notify RN of any seizure activity- Instruct patient/family to call for assistance with activity based on assessment  Outcome: Progressing  Goal: Achieves maximal functionality and self care  Description: INTERVENTIONS- Monitor swallowing and airway patency with patient fatigue and changes in neurological status- Encourage and assist patient to increase activity and self care with guidance from PT/OT- Encourage visually impaired, hearing impaired and aphasic patients to use assistive/communication devices  Outcome: Progressing

## 2025-04-18 NOTE — PHYSICAL THERAPY NOTE
PHYSICAL THERAPY EVALUATION - INPATIENT     Room Number: 6620/6620-A  Evaluation Date: 4/18/2025  Type of Evaluation: Re-evaluation  Physician Order: PT Eval and Treat    Presenting Problem: brain mass s/p L craniotomy 4/16/25  Co-Morbidities : Bladder CA with lung mets, depression, HTN, HL, osteopenia, CKDIII  Reason for Therapy: Mobility Dysfunction and Discharge Planning    PHYSICAL THERAPY ASSESSMENT   Patient is a 75 year old female admitted 4/10/2025 for AMS. Imaging revealing for new brain mass.  Patient with h/o metastatic urothelial carcinoma to the lung a/p chemotherapy and radical bladder resection and urostomy 2011.  Pt with mets to the lungs s/p bronch, R VATS with RML lobectomy on 1/24/2024.   Pt now s/p L craniotomy for tumor resection 4/16/25. Pt with seizure post operatively.     Prior to admission, patient's baseline is supervision.  Patient is currently functioning near baseline with bed mobility, transfers, and gait.  Patient is requiring minimal assist as a result of the following impairments: impaired dynamic standing balance and cognitive deficits (aphasia).  Physical Therapy will continue to follow for duration of hospitalization.    Patient will benefit from continued skilled PT Services at discharge to promote prior level of function and safety with additional support and return home with home health PT.    PLAN DURING HOSPITALIZATION  Nursing Mobility Recommendation : 1 Assist  PT Device Recommendation: Rolling walker  PT Treatment Plan: Bed mobility, Endurance, Energy conservation, Patient education, Gait training, Balance training, Transfer training, Neuromuscular re-educate  Rehab Potential : Fair  Frequency (Obs): 3-5x/week     CURRENT GOALS    Goal #1 Patient is able to demonstrate supine - sit EOB @ level: supervision     Goal #2 Patient is able to demonstrate transfers EOB to/from [facility] at assistance level: supervision     Goal #3 Patient is able to ambulate 150 feet with assist  device: LRAD at assistance level: supervision     Goal #4    Goal #5    Goal #6    Goal Comments: Goals established on 2025        HOME SITUATION  Type of Home: Condo  Home Layout: One level, Elevator  Stairs to Enter : 0        Stairs to Bedroom: 0         Lives With: Spouse    Drives: Yes   Patient Regularly Uses: None      Prior Level of Cloud: Pt lives with spouse in condo building with elevator. Pt independent with ADL and mobility. Pt does not use RW or cane at baseline.     SUBJECTIVE  Pt denies pain or dizziness.     OBJECTIVE  Precautions: Bed/chair alarm, Seizure (SBP: 100-150)  Fall Risk: High fall risk    WEIGHT BEARING RESTRICTION     PAIN ASSESSMENT  Ratin          COGNITION  Orientation Level:  oriented to place, oriented to person, and disoriented to time  Following Commands:  follows one-step commands inconsistently and follows multi-step commands inconsistently  Initiation: cues to initiate tasks  Motor Planning: impaired  Awareness of Errors:  assistance required to identify errors made and assistance required to correct errors made    RANGE OF MOTION AND STRENGTH ASSESSMENT  Upper extremity ROM and strength are within functional limits     Lower extremity ROM is within functional limits     Lower extremity strength is within functional limits     BALANCE  Static Sitting: Good  Dynamic Sitting: Good  Static Standing: Fair -  Dynamic Standing: Poor +    ADDITIONAL TESTS                                    ACTIVITY TOLERANCE                         O2 WALK       NEUROLOGICAL FINDINGS  Neurological Findings: Sensation  Coordination - Finger to Nose: Symmetrical  Coordination - Heel to Shin: Symmetrical  Coordination - Rapid Alternating Movement: Symmetrical  Sensation: intact         AM-PAC '6-Clicks' INPATIENT SHORT FORM - BASIC MOBILITY  How much difficulty does the patient currently have...  Patient Difficulty: Turning over in bed (including adjusting bedclothes, sheets and  blankets)?: A Little   Patient Difficulty: Sitting down on and standing up from a chair with arms (e.g., wheelchair, bedside commode, etc.): A Little   Patient Difficulty: Moving from lying on back to sitting on the side of the bed?: A Little   How much help from another person does the patient currently need...   Help from Another: Moving to and from a bed to a chair (including a wheelchair)?: A Little   Help from Another: Need to walk in hospital room?: A Little   Help from Another: Climbing 3-5 steps with a railing?: A Little     AM-PAC Score:  Raw Score: 18   Approx Degree of Impairment: 46.58%   Standardized Score (AM-PAC Scale): 43.63   CMS Modifier (G-Code): CK    FUNCTIONAL ABILITY STATUS  Gait Assessment   Functional Mobility/Gait Assessment  Gait Assistance: Minimum assistance  Distance (ft): 200  Assistive Device: None  Pattern:  (step through gait)  Stairs: Stairs  How Many Stairs: 0  Device: 1 Rail  Assist: Supervision  Pattern: Ascend and Descend  Ascend and Descend : Reciprocal    Skilled Therapy Provided   Supervision for bed mobility.   Impulsive with sit-stand.   Performed sit-stand with supervision.   Pt ambulatory primarily with CGA.   2 episodes of LOB requiring MIN assist for balance recovery.   Pt ambulates with short stride length and narrow SHALINI.   Difficulty performing dual task/ 2 step directions during ambulation.   Difficulty identifying room numbers while ambulating.  Pt returned to room sitting up in bedside chair.     Bed Mobility:  Rolling: supervision  Supine to sit: supervision   Sit to supine: NT     Transfer Mobility:  Sit to stand: supervision   Stand to sit: supervision   Gait = CGA/MIN    Therapist's Comments: Reviewed activity recommendations. Pt with difficulty following some commands- appears to present with expressive/receptive aphasia.     Exercise/Education Provided:  Bed mobility  Energy conservation  Functional activity tolerated  Gait training  Strengthening  Transfer  training    Patient End of Session: Up in chair, RN aware of session/findings, Call light within reach, Needs met, All patient questions and concerns addressed, Hospital anti-slip socks, Family present, Discussed recommendations with /      Patient Evaluation Complexity Level:  History Moderate - 1 or 2 personal factors and/or co-morbidities   Examination of body systems Moderate - addressing a total of 3 or more elements   Clinical Presentation  Moderate - Evolving   Clinical Decision Making Moderate Complexity       PT Session Time: 25 minutes  Gait Training: 10 minutes  Therapeutic Activity: 8 minutes  Neuromuscular Re-education:  minutes  Therapeutic Exercise:  minutes

## 2025-04-18 NOTE — PROCEDURES
LONG-TERM VIDEO EEG REPORT;    Reason for Examination: Seizures    Technical Summary:   18 Channels of EEG and 1 Channel of EKG was performed utilizing internation 10/20 method. Motion, muscle and machine artifacts were noted.       Recording Start date/time: 04/17/25/ at 0845  Recording end date/time: 04/18/25 at 085      Background Activity:   The background activity consisted of 7Hz waveforms, reactive to eye opening/ external stimulation.    Abnormality:  Throughout the recording low to medium voltage, polymorphic, 2 to 5 Hz slow activity was noted diffusely over both hemispheres      Activation:    Hyperventilation:   Not Performed.    Photic Stimulation:  Driving response seen.No       Sleep:  Stage I sleep seen.     Impression:  This is an Abnormal prolonged Video EEG study. No focal, lateralized or generalized epileptiform activity seen.   Mild diffuse slowing into delta and theta range was noted.  This constellation of findings can be seen in encephalopathy due to metabolic/toxic etiology, medication effects or diffuse cerebral injury.  Clinical correlation is recommended.        Ervin Capps MD  [facility].

## 2025-04-18 NOTE — PLAN OF CARE
Care assumed 1930 in bed, cEEG in place, no seizure activity noted clinically overnight. Saline infusing, Q2 neuros intact except for expressive aphasia. Able to answer yes/no questions more readily and can occasionally speak in short, clear sentences, but more often than not, expressively aphasic. SB/NSR and normotensive. Nephrostomy pouch in place and clean.

## 2025-04-19 LAB
GLUCOSE BLD-MCNC: 115 MG/DL (ref 70–99)
GLUCOSE BLD-MCNC: 151 MG/DL (ref 70–99)
GLUCOSE BLD-MCNC: 212 MG/DL (ref 70–99)

## 2025-04-19 RX ORDER — FAMOTIDINE 20 MG/1
20 TABLET, FILM COATED ORAL DAILY
Qty: 15 TABLET | Refills: 0 | Status: SHIPPED | OUTPATIENT
Start: 2025-04-19 | End: 2025-05-04

## 2025-04-19 RX ORDER — DEXAMETHASONE 2 MG/1
TABLET ORAL
Qty: 48 TABLET | Refills: 0 | Status: SHIPPED | OUTPATIENT
Start: 2025-04-19 | End: 2025-05-01

## 2025-04-19 NOTE — PROGRESS NOTES
[facility]  Neurosurgery Progress Note    Sujey Medina Patient Status:  Inpatient    3/6/1950 MRN [id number]   Location [facility] 6NE-A Attending Tessa Goodman MD   Hosp Day # 9 PCP Rajni Tiwari MD     PRINCIPLE PROBLEM:   POD #3 s/p left craniotomy for tumor resection by Dr. Daigle    SUBJECTIVE     Patient seen resting comfortably in bed. She is awake and bright with clear speech; does not appear to have any obvious word-finding difficulty at this time. No acute events or seizure activity noted overnight.     OBJECTIVE/PHYSICAL EXAM     VITALS:  /62   Pulse 56   Temp 97.7 °F (36.5 °C) (Temporal)   Resp 17   Ht 65\"   Wt 149 lb 0.5 oz (67.6 kg)   LMP  (LMP Unknown)   SpO2 96%   BMI 24.80 kg/m²     GENERAL: No acute distress, non-toxic appearing, mood appropriate    HEENT: Normocephalic, atraumatic    RESP:  Respirations non-labored, even    CV:  Well perfused    NEURO: Alert and oriented x3.  Able to follow commands.  Comprehension intact.  Speech clear, fluent.  Face is symmetric.  PERRLA +3 brisk, EOMI.  CN 2-12 grossly intact.  Sensation to light touch is intact bilaterally. No pronator drift. Follows commands with full strength x 4. Gait deferred.     SKIN: Surgical incision approximated with staples, remains C/D/I without drainage, erythema, edema.    LABS     Lab Results   Component Value Date    PGLU 115 2025     IMAGING     MRI BRAIN (W+WO) (CPT=70553)  Result Date: 2025  CONCLUSION:   1. Postoperative changes from a left frontal craniotomy for the purposes of tumor resection.  The vast majority of the heterogeneously enhancing centrally necrotic tumor within the left frontal lobe has been resected.  There is a mild amount of residual enhancing tumor within the posterior left frontal lobe near the left frontal horn.  This residual patchy enhancement measures approximately 11 x 8 x 10 mm.  2. A large amount of T2/FLAIR hyperintense signal is again seen throughout the  anterior and mid aspect of the left frontal lobe.  This T2/FLAIR hyperintense signal again extends into the right aspect of the genu of the corpus callosum.  This T2/FLAIR hyperintense signal likely represents edema with nonenhancing tumor not excluded.  3. There is persistent mass effect on the left frontal horn.  Mild right midline shift is again noted.  No hydrocephalus.    LOCATION:  [id number]    Dictated by (CST): Stromberg, LeRoy, MD on 4/18/2025 at 1:47 PM     Finalized by (CST): Stromberg, LeRoy, MD on 4/18/2025 at 2:07 PM         ASSESSMENT & PLAN     ASSESSMENT:  POD #3 s/p left craniotomy for tumor resection by Dr. Daigle    PLAN:  Optimization per NCC/Hospitalist  Seizure management per NCC/Neurology  SBP goal 100-150  PT/OT  Decadron taper as ordered over 2 weeks down to Decadron 2mg BID. GI ppx while on Decadron.   Appreciate Onc recommedations  Awaiting final pathology  Okay to discharge home with home health PT from Neurosurgical standpoint, once cleared by other services.   F/u in Neurosurgery clinic in 2 weeks - our office will reach out on Monday.     The above plan was discussed with Dr. Madrigal.    Zahida Adames, ADALBERTO-NP  [facility]  4/19/2025, 7:44 AM

## 2025-04-19 NOTE — CM/SW NOTE
Met with patient and spouse Venkatesh to discuss discharge planning.  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  Venkatesh and Sujey have been  for 45 years.  Explained the role of HHC--[facility] list given--sp[facility] selected [facility]--reserved in [facility].    Spouse expressed concerns as it is 'just me and my wife.'  Talked about [facility]--may not be an immediate resource but could possibly assist if Sujey's mentation does clear--message left for follow up with spouse as well as [facility] information card.  Spouse verbalized appeciation for the visit  as well as information provided.

## 2025-04-19 NOTE — PLAN OF CARE
Care assumed 1930 in bed, no seizure activity noted clinically overnight. Saline infusing, Q4 neuros intact except for some word finding difficulty.  SB/NSR and normotensive. Urostomy changed in am. Surgical incision clean and approximated, healing well.

## 2025-04-19 NOTE — PROGRESS NOTES
[facility] Hospitalist Progress Note       SUBJECTIVE:  Pt awake and alert   No headaches no confusion noted      at bedside feels like he can't care for her at home     OBJECTIVE:  Scheduled Meds: Scheduled Medications[1]  Continuous Infusions: Medication Infusions[2]  PRN Meds: PRN Medications[3]    Vitals  Vitals:    04/19/25 0700   BP: 132/67   Pulse: 60   Resp: 16   Temp:          Exam   Gen-    no acute distress, alert    RESP-   Lungs CTA, normal respiratory effort  CV-      Heart RRR, no mgr  Abd-    soft, nondistended, nontender, bowel sounds present  Skin-    no rash  Neuro-  no focal neurologic deficits  Ext-      No edema in extremities   Psych- alert      Labs:     Recent Labs   Lab 04/13/25  0533 04/14/25  0613 04/15/25  0757 04/17/25  0404 04/18/25  0228   WBC 13.5* 12.1* 13.0* 17.2* 12.8*   HGB 11.4* 12.1 12.2 11.6* 11.3*   MCV 96.5 97.5 95.9 97.2 100.9*   .0 198.0 195.0 178.0 151.0   BAND  --   --   --  2 3   INR  --   --  0.96  --   --        Recent Labs   Lab 04/13/25  0533 04/14/25  0613 04/15/25  0757 04/17/25  0404 04/18/25  0228    142 141 141 140   K 4.2 4.7 4.4 4.0 4.4    109 109 110 110   CO2 23.0 24.0 23.0 20.0* 22.0   BUN 26* 24* 27* 25* 17   CREATSERUM 1.21* 1.17* 1.21* 0.99 0.90   CA 9.0 9.2 9.1 7.3* 7.6*   MG 2.1 2.2  --   --   --    PHOS 3.1 3.4  --   --   --    * 135* 129* 132* 121*       Recent Labs   Lab 04/13/25  0533 04/14/25 0613 04/18/25 0228   ALT  --   --  14   AST  --   --  21   ALB 3.7 3.8 3.3       Recent Labs   Lab 04/18/25  0609 04/18/25  1108 04/18/25  1716 04/18/25 2058 04/19/25  0652   PGLU 119* 108* 116* 124* 115*       AP:  75 year old female with PMH of stage IV bladder cancer with lung metastases s/p cystectomy with ileal conduit and wedge resection of TONNY nodules currently on Enfortumab, HTN, HLD, CKD3b, MDD presenting to the hospital for AMS     Left frontal lobe mass with vasogenic edema  Acute Metabolic Encephalopathy  -Imminent  threat to neurologic function given patient's altered mental status, could be primary CNS tumor versus metastases  -CT C/A/P: negative for other metastases     Plan:  - s/p left image guided craniotaomy for brain mass on 4/16, had 90 sec seizure, whole body shocking, was given ativan on 4/17, pt started on keppra   - steroids per NSG  - Per onc:  will need radiation oncology outpatient     Stage IV bladder cancer s/p cystectomy with ileal conduit  Hx of Lung metastases s/p wedge resection of TONNY nodules  Plan:  -Oncology consulted inpatient, recommending eventual radiation as outpatient     Pyuria  UTI/Acute Cystitis - ruled out  -Urostomy likely colonized, no sign/concerns of an acute infection at this time  -Per ID, no need for antibiotics     CKD stage IIIb  Hyperkalemia - resolved  -Serial RFP's     Hyperlipidemia  -Continue home statin     Depression  -Continue home bupropion, buspirone     Hypertension  -Continue home beta-blocker           Tessa Goodman MD               [1]    levETIRAcetam  1,000 mg Oral BID    buPROPion SR  200 mg Oral Daily    dexamethasone  4 mg Oral Q8H OFE    Followed by    [START ON 4/23/2025] dexamethasone  4 mg Oral 2 times per day    Followed by    [START ON 4/27/2025] dexamethasone  2 mg Oral 2 times per day    famotidine  20 mg Oral Daily    atorvastatin  20 mg Oral Nightly    [Held by provider] heparin  5,000 Units Subcutaneous 2 times per day    busPIRone  5 mg Oral BID    metoprolol tartrate  25 mg Oral BID   [2] [3]   LORazepam    glucose **OR** glucose **OR** glucose-vitamin C **OR** dextrose **OR** glucose **OR** glucose **OR** glucose-vitamin C    HYDROcodone-acetaminophen    morphINE    acetaminophen    labetalol    hydrALAzine    ondansetron

## 2025-04-19 NOTE — DISCHARGE INSTRUCTIONS
Sometimes managing your health at home requires assistance.  The [facility] team has recognized your preference to use [facility].  They can be reached by phone at [phone].  The fax number for your reference is [phone].  A representative from the home health agency will contact you or your family to schedule your first visit.      [facility] @ discharge  [phone]      You are to take keppra (seizure medication) twice a day until yoou see the neurosurgeon in clinic.  Please take the steroids as listed this is very important

## 2025-04-19 NOTE — SLP NOTE
SPEECH DAILY NOTE - INPATIENT    ASSESSMENT & PLAN   ASSESSMENT  Patient seen with PO trials of general solids x3 and thin liquids via cup x3 and straw x3. Patient appears to be tolerating current diet of general solids and thin liquids, without s/s of aspiration. Education patient and  on s/s of aspiration and difficulties swallowing.  present at bedside and denies s/s of dysphagia.     Patient was able to label 8/10 items in the room accurately. Paraphasias present in her speech during conversation and labeling. Such as stating \"sweater\" instead of \"spoon.\" She was aware of her deficits and was able to accurately state correct object with minimal cues/prompts. She is able to count 1-10 and state days of the week. Able to write name and simple single word, however, deficits noted when asked to write her address and a sentence. She began writing her past address that  reports she lived at over 10 years ago. Some spelling errors while writing a sentence. She was able to form 1-4 word utterances when asked to describe what she saw around the room. Patient was given reading material. She was able to read most words correctly with about 75% accuracy, however, paraphasias within reading seen such as patient verbalizing \"[place]\" instead of \"[place].\" Educated patient/ on word finding/communication strategies, such as describing and answer yes/no questions, rather than attempting to answer open ended questions.      reports her speech has improved over the last few days. She is able to state his name correctly, when previously she had called him \"3 o'clock.\"  Patient will need to be followed up with  for aphasia treatment at discharge.    Diet Recommendations - Solids: Regular  Diet Recommendations - Liquids: Thin Liquids     Aspiration Precautions: Upright position  Medication Administration Recommendations: No restrictions    Patient Experiencing Pain: No     Treatment  Plan  Treatment Plan/Recommendations: Aphasia therapy    Interdisciplinary Communication: RN    GOALS  Goal #1 The patient will tolerate regular consistency and thin liquids without overt signs or symptoms of aspiration with 90 % accuracy over 1-2 session(s).  MET   Goal #2 The patient/family/caregiver will demonstrate understanding and implementation of aspiration precautions and swallow strategies independently over 1-2 session(s).     MET   Goal #3 The patient will follow 2 step directions with x1-2 cues with 70 % accuracy within 3 3 session(s).  In Progress   Goal #4 The patient will identify common object in a field of 2 with min cues with 80 % accuracy within 2 2 session(s).     In Progress   Goal #5 The patient will complete Confrontation, Responsve naming tasks with mod cues with 75 % accuracy within 3 3 session(s).      In Progress   Goal #6 The patient will complete sentences with mod cues with 80 % accuracy within 3 session(s).      In Progress   Goal #7 Ongoing diagnostic intervention and goals to be added as appropriate    In Progress        FOLLOW UP  Follow Up Needed (Documentation Required): Yes  SLP Follow-up Date: 04/21/25  Duration: 1 week    Session: 2    If you have any questions, please contact Mamta Castillo, SLP

## 2025-04-19 NOTE — HOME CARE LIAISON
Received referral via [facility] for Home Health services. Spoke w/ patient's spouse/Venkatesh who is agreeable with [facility]. Contact information placed on AVS.

## 2025-04-19 NOTE — PLAN OF CARE
Alert, expressive aphasia, able to make needs known, some cognitive difficulties (was going to pour milk on her sandwich instead of of cereal), speech therapy updated. Denies pain. Frontal incision with staples healing WDL. Ok to dc home per neurosurg.  expressed concerns caring for patient at home, hospitalist and  notified, met with  for home health and other referrals.       Problem: NEUROLOGICAL - ADULT  Goal: Achieves stable or improved neurological status  Description: INTERVENTIONS- Assess for and report changes in neurological status- Initiate measures to prevent increased intracranial pressure- Maintain blood pressure and fluid volume within ordered parameters to optimize cerebral perfusion and minimize risk of hemorrhage- Monitor temperature, glucose, and sodium. Initiate appropriate interventions as ordered  Outcome: Progressing  Goal: Absence of seizures  Description: INTERVENTIONS- Monitor for seizure activity- Administer anti-seizure medications as ordered- Monitor neurological status  Outcome: Progressing  Goal: Remains free of injury related to seizure activity  Description: INTERVENTIONS:- Maintain airway, patient safety  and administer oxygen as ordered- Monitor patient for seizure activity, document and report duration and description of seizure to MD/LIP- If seizure occurs, turn patient to side and suction secretions as needed- Reorient patient post seizure- Seizure pads on all 4 side rails- Instruct patient/family to notify RN of any seizure activity- Instruct patient/family to call for assistance with activity based on assessment  Outcome: Progressing  Goal: Achieves maximal functionality and self care  Description: INTERVENTIONS- Monitor swallowing and airway patency with patient fatigue and changes in neurological status- Encourage and assist patient to increase activity and self care with guidance from PT/OT- Encourage visually impaired, hearing impaired and aphasic  patients to use assistive/communication devices  Outcome: Progressing

## 2025-04-20 VITALS
HEIGHT: 65 IN | RESPIRATION RATE: 22 BRPM | WEIGHT: 149.06 LBS | BODY MASS INDEX: 24.83 KG/M2 | DIASTOLIC BLOOD PRESSURE: 74 MMHG | HEART RATE: 62 BPM | TEMPERATURE: 97 F | OXYGEN SATURATION: 95 % | SYSTOLIC BLOOD PRESSURE: 108 MMHG

## 2025-04-20 LAB
GLUCOSE BLD-MCNC: 131 MG/DL (ref 70–99)
GLUCOSE BLD-MCNC: 138 MG/DL (ref 70–99)
GLUCOSE BLD-MCNC: 209 MG/DL (ref 70–99)

## 2025-04-20 RX ORDER — LEVETIRACETAM 1000 MG/1
1000 TABLET ORAL 2 TIMES DAILY
Qty: 60 TABLET | Refills: 0 | Status: SHIPPED
Start: 2025-04-20

## 2025-04-20 RX ORDER — POLYETHYLENE GLYCOL 3350 17 G/17G
17 POWDER, FOR SOLUTION ORAL DAILY PRN
Status: DISCONTINUED | OUTPATIENT
Start: 2025-04-20 | End: 2025-04-20

## 2025-04-20 NOTE — PLAN OF CARE
Received pt at 0030  Pt AOx4, NSR/SB, RA, VSS  Neuro checks. See flowsheets.  Scalp incision laith  D/c Planning: Pending clinical course. SW planning  Call light within reach.  Needs currently met      Problem: Patient/Family Goals  Goal: Patient/Family Long Term Goal  Description: Patient's Long Term Goal: Return home with family, adequate resources, stable neurological status    Interventions:  - PT/OT, neuro checks per protocol  - See additional Care Plan goals for specific interventions  Outcome: Progressing  Goal: Patient/Family Short Term Goal  Description: Patient's Short Term Goal: Absence of seizures and complications    Interventions:   - Monitoring, scans per MD  - See additional Care Plan goals for specific interventions  Outcome: Progressing     Problem: PAIN - ADULT  Goal: Verbalizes/displays adequate comfort level or patient's stated pain goal  Description: INTERVENTIONS:- Encourage pt to monitor pain and request assistance- Assess pain using appropriate pain scale- Administer analgesics based on type and severity of pain and evaluate response- Implement non-pharmacological measures as appropriate and evaluate response- Consider cultural and social influences on pain and pain management- Manage/alleviate anxiety- Utilize distraction and/or relaxation techniques- Monitor for opioid side effects- Notify MD/LIP if interventions unsuccessful or patient reports new pain- Anticipate increased pain with activity and pre-medicate as appropriate  Outcome: Progressing     Problem: RISK FOR INFECTION - ADULT  Goal: Absence of fever/infection during anticipated neutropenic period  Description: INTERVENTIONS- Monitor WBC- Administer growth factors as ordered- Implement neutropenic guidelines  Outcome: Progressing     Problem: SAFETY ADULT - FALL  Goal: Free from fall injury  Description: INTERVENTIONS:- Assess pt frequently for physical needs- Identify cognitive and physical deficits and behaviors that affect risk  of falls.- [place] fall precautions as indicated by assessment.- Educate pt/family on patient safety including physical limitations- Instruct pt to call for assistance with activity based on assessment- Modify environment to reduce risk of injury- Provide assistive devices as appropriate- Consider OT/PT consult to assist with strengthening/mobility- Encourage toileting schedule  Outcome: Progressing     Problem: DISCHARGE PLANNING  Goal: Discharge to home or other facility with appropriate resources  Description: INTERVENTIONS:- Identify barriers to discharge w/pt and caregiver- Include patient/family/discharge partner in discharge planning- Arrange for needed discharge resources and transportation as appropriate- Identify discharge learning needs (meds, wound care, etc)- Arrange for interpreters to assist at discharge as needed- Consider post-discharge preferences of patient/family/discharge partner- Complete POLST form as appropriate- Assess patient's ability to be responsible for managing their own health- Refer to Case Management Department for coordinating discharge planning if the patient needs post-hospital services based on physician/LIP order or complex needs related to functional status, cognitive ability or social support system  Outcome: Progressing     Problem: Altered Communication/Language Barrier  Goal: Patient/Family is able to understand and participate in their care  Description: Interventions:- Assess communication ability and preferred communication style- Implement communication aides and strategies- Use visual cues when possible- Listen attentively, be patient, do not interrupt- Minimize distractions- Allow time for understanding and response- Establish method for patient to ask for assistance (call light)- Provide an  as needed- Communicate barriers and strategies to overcome with those who interact with patient  Outcome: Progressing     Problem: SKIN/TISSUE INTEGRITY -  ADULT  Goal: Skin integrity remains intact  Description: INTERVENTIONS- Assess and document risk factors for pressure ulcer development- Assess and document skin integrity- Monitor for areas of redness and/or skin breakdown- Initiate interventions, skin care algorithm/standards of care as needed  Outcome: Progressing  Goal: Incision(s), wounds(s) or drain site(s) healing without S/S of infection  Description: INTERVENTIONS:- Assess and document risk factors for pressure ulcer development- Assess and document skin integrity- Assess and document dressing/incision, wound bed, drain sites and surrounding tissue- Implement wound care per orders- Initiate isolation precautions as appropriate- Initiate Pressure Ulcer prevention bundle as indicated  Outcome: Progressing     Problem: NEUROLOGICAL - ADULT  Goal: Achieves stable or improved neurological status  Description: INTERVENTIONS- Assess for and report changes in neurological status- Initiate measures to prevent increased intracranial pressure- Maintain blood pressure and fluid volume within ordered parameters to optimize cerebral perfusion and minimize risk of hemorrhage- Monitor temperature, glucose, and sodium. Initiate appropriate interventions as ordered  Outcome: Progressing  Goal: Absence of seizures  Description: INTERVENTIONS- Monitor for seizure activity- Administer anti-seizure medications as ordered- Monitor neurological status  Outcome: Progressing  Goal: Remains free of injury related to seizure activity  Description: INTERVENTIONS:- Maintain airway, patient safety  and administer oxygen as ordered- Monitor patient for seizure activity, document and report duration and description of seizure to MD/LIP- If seizure occurs, turn patient to side and suction secretions as needed- Reorient patient post seizure- Seizure pads on all 4 side rails- Instruct patient/family to notify RN of any seizure activity- Instruct patient/family to call for assistance with  activity based on assessment  Outcome: Progressing  Goal: Achieves maximal functionality and self care  Description: INTERVENTIONS- Monitor swallowing and airway patency with patient fatigue and changes in neurological status- Encourage and assist patient to increase activity and self care with guidance from PT/OT- Encourage visually impaired, hearing impaired and aphasic patients to use assistive/communication devices  Outcome: Progressing

## 2025-04-20 NOTE — DISCHARGE SUMMARY
[facility] Hospitalist Discharge Summary    Patient ID  Sujey Medina  [id number]  75 year old  3/6/1950  Admit date: 4/10/2025  Discharge date: No discharge date for patient encounter. ***  Attending: Tessa Goodman MD   Primary Care Physician: Rajni Tiwari MD ***  Reason for admission: *** (see HPI on HP for further detail)  Discharge condition: {condition:46418}  Disposition: {disposition:56228}    Important follow up:  -PCP within 7*** 14*** days or other ***  -specialists: ***    -labs: ***  -radiology: ***    Additional patient instructions  ***n/a    Discharge med list     Medication List        START taking these medications      dexamethasone 2 MG Tabs  Commonly known as: Decadron  Take 2 tablets (4 mg total) by mouth every 8 (eight) hours for 4 days, THEN 2 tablets (4 mg total) every 12 (twelve) hours for 4 days, THEN 1 tablet (2 mg total) every 12 (twelve) hours for 4 days.  Start taking on: April 19, 2025     famotidine 20 MG Tabs  Commonly known as: Pepcid  Take 1 tablet (20 mg total) by mouth daily for 15 days.     levetiracetam 1000 MG Tabs  Commonly known as: KEPPRA  Take 1 tablet (1,000 mg total) by mouth 2 (two) times daily. Continue this med until you follow up in [facility]            CONTINUE taking these medications      acidophilus-pectin Caps  Commonly known as: Probiotic     buPROPion HCl ER (SR) 200 MG Tb12  Commonly known as: WELLBUTRIN SR     busPIRone 5 MG Tabs  Commonly known as: Buspar     metoprolol tartrate 25 MG Tabs  Commonly known as: Lopressor     MULTI-VITAMIN DAILY OR     simvastatin 40 MG Tabs  Commonly known as: Zocor  TAKE ONE TABLET (40 MG TOTAL) BY MOUTH NIGHTLY               Where to Get Your Medications        These medications were sent to [facility] - [place] [address] - [address] [address] [phone], [phone]  [address] [address] [address] [id number]      Phone: [phone]   dexamethasone 2 MG Tabs  famotidine 20 MG Tabs       You can get these medications from any  pharmacy    Bring a paper prescription for each of these medications  levetiracetam 1000 MG Tabs         Discharge Diagnoses/Hospital course:  ***    Consults:  IP CONSULT TO NEUROSURGERY  IP CONSULT TO HOSPITALIST  IP CONSULT TO ONCOLOGY  IP CONSULT TO INFECTIOUS DISEASE  IP CONSULT TO HOSPITALIST  IP CONSULT TO RESPIRATORY CARE  IP CONSULT TO NEURO INTENSIVIST  IP CONSULT TO CASE MANAGEMENT  IP CONSULT TO SOCIAL WORK    Radiology:  MRI BRAIN (W+WO) (CPT=70553)  Result Date: 4/18/2025  PROCEDURE:  MRI BRAIN (W+WO) (CPT=70553)  COMPARISON:  [facility], MRI BRAIN (W+WO) (CPT=70553), 4/10/2025, 10:21 PM.  INDICATIONS:  s/p l crani for tumor rsxn  TECHNIQUE:  MRI of the brain was performed with multi-planar T1, T2-weighted images with FLAIR sequences and diffusion weighted images without and with infusion.  PATIENT STATED HISTORY:(As transcribed by Technologist)  S/P crani follow-up   CONTRAST USED:  20 mL of Dotarem  FINDINGS:   There are postoperative changes from a left frontal craniotomy.  The vast majority of the heterogeneously enhancing centrally necrotic tumor within left frontal lobe has been resected.  There is a mild amount residual enhancing tumor seen within the posterior left frontal lobe near the left frontal horn.  This measures 11 x 8 x 10 mm. There is T1 shortening within the left frontal lobe resection cavity consistent with blood products.  A small amount pneumocephalus is seen within the left frontal lobe resection cavity and subjacent to the craniotomy.  There is an extra-axial collection of fluid, blood products, and air.  This collection measures approximately 10 mm in thickness.  There is gradient susceptibility subjacent to the craniotomy consistent with blood products.  There is a large amount of T2/FLAIR hyperintense signal throughout the anterior/mid aspect of the left frontal lobe consistent with edema with nonenhancing tumor not excluded.  This T2/FLAIR hyperintense signal has a similar  appearance since prior imaging.  This again extends into the anterior left basal ganglia and left insular region.  There is persistent mass effect on the left frontal horn.  There is mild rightward midline shift again noted.  There is persistent T2/FLAIR hyperintense signal extending into the right aspect of the genu of the corpus callosum, similar since prior imaging.  There are a few punctate foci of T2 hyperintense signal within the periventricular and subcortical white matter.  The major intracranial flow voids are present.  There is mild to moderate opacification of the left maxillary sinus.  There is mild mucosal thickening within the ethmoid sinus.  A trace amount of fluid is seen within the mastoid air cells.  There is a trace amount of restriction abnormality at the margins of the resection cavity.  No acute territorial infarct is identified.            CONCLUSION:   1. Postoperative changes from a left frontal craniotomy for the purposes of tumor resection.  The vast majority of the heterogeneously enhancing centrally necrotic tumor within the left frontal lobe has been resected.  There is a mild amount of residual enhancing tumor within the posterior left frontal lobe near the left frontal horn.  This residual patchy enhancement measures approximately 11 x 8 x 10 mm.  2. A large amount of T2/FLAIR hyperintense signal is again seen throughout the anterior and mid aspect of the left frontal lobe.  This T2/FLAIR hyperintense signal again extends into the right aspect of the genu of the corpus callosum.  This T2/FLAIR hyperintense signal likely represents edema with nonenhancing tumor not excluded.  3. There is persistent mass effect on the left frontal horn.  Mild right midline shift is again noted.  No hydrocephalus.    LOCATION:  [id number]    Dictated by (CST): Stromberg, LeRoy, MD on 4/18/2025 at 1:47 PM     Finalized by (CST): Stromberg, LeRoy, MD on 4/18/2025 at 2:07 PM       CT BRAIN OR HEAD  (CPT=70450)  Result Date: 4/17/2025  PROCEDURE:  CT BRAIN OR HEAD (67727)  COMPARISON:  [facility] , MR, MRI BRAIN (W+WO) (CPT=70553), 4/10/2025, 10:21 PM.  [facility] , CT, CT BRAIN OR HEAD (63788), 4/10/2025, 7:54 PM.  INDICATIONS:  seizure  TECHNIQUE:  Noncontrast CT scanning is performed through the brain. Dose reduction techniques were used. Dose information is transmitted to the ACR (American College of Radiology) NRDR (National Radiology Data Registry) which includes the Dose Index Registry.  PATIENT STATED HISTORY: (As transcribed by Technologist)  New seizure, left gaze. Post frontal brain tumor resection 4/16/25.   A preliminary radiology report was created by the [facility] radiology service.  This report was sent to the emergency department.  The report was reviewed prior to this dictation.  FINDINGS:     VENTRICLES/SULCI/INTRACRANIAL:  There are interval postsurgical changes of previous left frontal craniotomy for left frontal lobe mass resection.  Fluid and air underlying the craniotomy and within the resection cavity in the left frontal lobe is noted with trace blood products consistent with recent postsurgical change.  There is persistent hypo attenuation within the left frontal and left temporal lobe extending to the corpus callosum from vasogenic edema without significant change.  There is persistent mass effect locally in the left frontal and temporal lobe as well as upon the bilateral frontal horns of the right left lateral ventricles.  Subfalcine herniation noted with approximately 6 mm of left-to-right midline shift.  No occult herniation.  Basilar cisterns are patent.  Moderate atrophy seen in the right frontal lobe and right temporal lobe.   SINUSES:           Chronic sinusitis changes are seen in the left maxillary sinus.  No air-fluid levels to suggest acute sinusitis. MASTOIDS:          No sign of acute inflammation. SKULL:             Postoperative changes of recent left frontal craniotomy. OTHER:              None.            CONCLUSION:  Interval postsurgical changes of left frontal craniotomy for left frontal lobe mass resection.  Postsurgical changes are noted as detailed above.  Persistent hyperdensity within the left frontal lobe and left upper lobe noted from vasogenic edema.  There is persistent mass effect and approximately 6 mm of left-to-right midline shift.  Trace blood products are noted in the left frontal lobe resection cavity consistent with recent postsurgical change.  Please see above for details.    LOCATION:  [facility]   Dictated by (CST): Jez Agustin MD on 4/17/2025 at 4:26 AM     Finalized by (CST): Jez Agustin MD on 4/17/2025 at 4:38 AM       CT CHEST+ABDOMEN+PELVIS(ALL [facility] ONLY)(BCN=18247/01981)  Result Date: 4/11/2025  PROCEDURE:  CT CHEST+ABDOMEN+PELVIS(ALL [facility] ONLY)(CPT=71260/34239)  COMPARISON:  None.  INDICATIONS:  History of bladder cancer.  Intracranial mass.  Evaluate for other sites of malignant disease.  TECHNIQUE:  IV contrast-enhanced scanning through the chest, abdomen, and pelvis was performed.  Dose reduction techniques were used. Dose information is transmitted to the ACR (American College of Radiology) NRDR (National Radiology Data Registry) which  includes the Dose Index Registry.  PATIENT STATED HISTORY:(As transcribed by Technologist)    CONTRAST USED:  85cc of Isovue 370  FINDINGS:   CHEST:  Heart size within normal limits.  No pericardial effusion.  No significant coronary calcification.  Normal caliber and enhancement of the thoracic aorta with mild calcified atherosclerosis throughout its course.  No central pulmonary embolism.  Right upper paratracheal lymph node measures 9 mm in short axis (series 2, image 30), nonspecific.  Diminutive thyroid gland.  Normal esophagus with small sliding hiatal hernia.  No central airway stenosis.  Postoperative changes of middle lobectomy and left upper lobe pulmonary wedge resection with scattered areas of  scarring/atelectasis throughout the lungs.  No acute airspace disease.  No suspicious pulmonary mass/nodule.  The pleural spaces are clear.  Right subclavian chest port terminates in the lower SVC.  Regional soft tissues are within normal limits.  No aggressive osseous lesions.  ABDOMEN/PELVIS: Normal liver morphology with subcentimeter hypodensity of the lateral segment left lobe, too small to characterize.  Normal gallbladder, bile ducts, spleen, pancreas, and adrenal glands.  Marked cortical atrophy of the left kidney.  Prominent cortical scarring of the superior pole right kidney.  Cystectomy with ileal conduit urinary diversion noted.  No obstructive uropathy of either collecting system.  No evidence of bowel inflammation or obstruction.  Normal appendix.  Sigmoid diverticulosis.  Cystectomy with ileal conduit urinary diversion.  Hysterectomy.  Ovaries are absent or atrophic.  Pelvic lymph node dissection.  No ascites or pneumoperitoneum.  No abdominal, retroperitoneal, or pelvic lymphadenopathy.  No abdominal aortic aneurysm.  Portal venous system is patent.  Small fat containing parastomal hernia of the right lower quadrant.  No focal osseous or soft tissue lesions identified.  Osteoarthritis of the hips and spine.            CONCLUSION:   No acute process or evidence of malignancy within the chest, abdomen, or pelvis.    LOCATION:  [facility]    Dictated by (CST): Mir Gruber MD on 4/11/2025 at 1:20 PM     Finalized by (CST): Mir Gruber MD on 4/11/2025 at 1:34 PM       MRI BRAIN (W+WO) (CPT=70553)  Result Date: 4/10/2025  PROCEDURE:  MRI BRAIN (W+WO) (CPT=70553)  COMPARISON:  [facility], MRI BRAIN (CPT=70551), 10/22/2022, 3:42 PM.  INDICATIONS:  frontal lobe mass  TECHNIQUE:  MRI of the brain was performed with multi-planar T1, T2-weighted images with FLAIR sequences and diffusion weighted images without and with infusion.  PATIENT STATED HISTORY:(As transcribed by Technologist)  Frontal lobe mass   CONTRAST  USED:  20 mL of Dotarem  FINDINGS:  An enhancing mass in the left frontal lobe is noted measuring up to 5.7 x 3.2 x 3.3 cm (series 10, image 112, series 1001, image 28) compatible with a neoplastic process.  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  Marked adjacent vasogenic edema is noted with mass effect on the left lateral ventricle.  Subpleural seen herniation is noted measuring up to 1.1 cm.  There is approximately 1.0 cm of left-to-right midline shift.  No additional enhancing lesions are noted in the brain.  Basal cisterns are patent.  Mass effect on the left lateral ventricle is noted.  The 4th ventricle is within normal limits.  No intracranial hemorrhage.  No abnormal extra-axial collections.  Marked mucosal thickening in the left maxillary sinus is present.  No aggressive osseous lesions.            CONCLUSION:  1. Enhancing mass in the left frontal lobe with marked adjacent vasogenic edema as described above is concerning for primary CNS neoplasm   LOCATION:  [facility]    Dictated by (CST): Eduardo Alejandra MD on 4/10/2025 at 11:14 PM     Finalized by (CST): Eduardo Alejandra MD on 4/10/2025 at 11:17 PM       XR CHEST AP PORTABLE  (CPT=71045)  Result Date: 4/10/2025  PROCEDURE:  XR CHEST AP PORTABLE  (CPT=71045)  TECHNIQUE:  AP chest radiograph was obtained.  COMPARISON:  [facility] , XR, XR CHEST PA + LAT CHEST (CPT=71046), 10/21/2022, 10:10 AM.  INDICATIONS:  ams  PATIENT STATED HISTORY: (As transcribed by Technologist)  Patient offered no additional history at this time.   FINDINGS:  The cardiomediastinal silhouette is within normal limits.  Right chest port terminates in the superior vena cava.  Elevation of the right hemidiaphragm with mild blunting of the right costophrenic angle is present.  Left lung is clear.  No consolidation or pneumothorax.  No aggressive osseous lesions are identified.            CONCLUSION:  New right chest port.  Elevation of the right hemidiaphragm with small right effusion  is noted.   LOCATION:  [facility]      Dictated by (CST): Eduardo Alejandra MD on 4/10/2025 at 8:42 PM     Finalized by (CST): Eduardo Alejandra MD on 4/10/2025 at 8:43 PM       CT BRAIN OR HEAD (CPT=70450)  Result Date: 4/10/2025  PROCEDURE:  CT BRAIN OR HEAD ([id number])  COMPARISON:  [facility] , CT, CT BRAIN OR HEAD (51408), 10/20/2022, 10:56 AM.  INDICATIONS:  alter mental status  TECHNIQUE:  Noncontrast CT scanning is performed through the brain. Dose reduction techniques were used. Dose information is transmitted to the ACR (American College of Radiology) NRDR (National Radiology Data Registry) which includes the Dose Index Registry.  PATIENT STATED HISTORY: (As transcribed by Technologist)  ams    FINDINGS:  VENTRICLES/SULCI:  Ventricles and sulci are normal in size.  INTRACRANIAL:  Vasogenic edema throughout the left frontal lobe.  There is masslike lesion within the subcortical white matter measuring approximately 26 x 20 mm could represent a primary neoplasm versus metastatic lesion within the brain.  Other etiologies including infection and in the differential.  Further evaluation using MRI with gadolinium would be recommended.  There is no acute hemorrhage.  There is 3 mm of midline shift to the right. SINUSES:           No sign of acute sinusitis.  MASTOIDS:          No sign of acute inflammation. SKULL:             No evidence for fracture or osseous abnormality. OTHER:             None.            CONCLUSION:  Significant vasogenic edema in left frontal lobe likely due to an underlying mass lesion which could represent a primary glioma of the brain versus metastatic lesion.  Also within the differential would be infection.  Further evaluation using MRI brain with gadolinium is recommended.    LOCATION:  [facility]   Dictated by (CST): Ivan Christian MD on 4/10/2025 at 8:27 PM     Finalized by (CST): Ivan Christian MD on 4/10/2025 at 8:30 PM         Operative reports:  Procedure(s) (LRB):  LEFT IMAGE GUIDED CRANIOTOMY FOR TUMOR  (N/A)  NAVIGATION SYSTEM NEURO (N/A)        Day of discharge exam:  Vitals:    04/20/25 1445   BP:    Pulse: 62   Resp: 22   Temp:      No acute distress, alert and oriented***  Lungs clear  Heart regular  Abdomen benign     Patient and/or family had opportunity to ask questions and expressed understanding and agreement with therapeutic plan as outlined      >30 minutes spent on discharge    Tessa Goodman MD

## 2025-04-20 NOTE — PROGRESS NOTES
[facility]  Neurosurgery Progress Note    Sujey Medina Patient Status:  Inpatient    3/6/1950 MRN [id number]   Location [facility] 6NE-A Attending Tessa Goodman MD   Hosp Day # 10 PCP Rajni Tiwari MD     PRINCIPLE PROBLEM:   POD #4 s/p left craniotomy for tumor resection by Dr. Daigle    SUBJECTIVE     Patient is sitting comfortably in bed. She is awake and bright, states she feels well. She has no pain, no new complaints. Some confusion/deficits with comprehension - states she is confused about \"the plans for Monday.\" Continues to have expressive aphasia though none noted at present.  No acute events overnight. No family at bedside.    OBJECTIVE/PHYSICAL EXAM     VITALS:  /63 (BP Location: Left arm)   Pulse 59   Temp 97.7 °F (36.5 °C) (Oral)   Resp 24   Ht 65\"   Wt 149 lb 0.5 oz (67.6 kg)   LMP  (LMP Unknown)   SpO2 98%   BMI 24.80 kg/m²     GENERAL: No acute distress, non-toxic appearing, mood appropriate    HEENT: Normocephalic, atraumatic    RESP:  Respirations non-labored, even    CV:  Well perfused    NEURO: Alert and oriented x3.  Able to follow commands. Some difficulty with comprehension when discussing discharge plans, improved understanding with repetition. Speech clear. Ongoing expressive aphasia reported though none currently. Able to identify 3 objects currently in room. Able to repeat \"No ifs, ands, or buts\" and \"It's a bright margoth day\" with clear speech. Face is symmetric.  PERRLA +3 brisk, EOMI.  CN 2-12 grossly intact.  Sensation to light touch is intact bilaterally. No pronator drift. Follows commands with full strength x 4. Gait deferred.     SKIN: Surgical incision approximated with staples, remains C/D/I without drainage, erythema, edema.    LABS     Lab Results   Component Value Date    PGLU 138 2025     IMAGING     No new imaging to review.    ASSESSMENT & PLAN     ASSESSMENT:  POD #4 s/p left craniotomy for tumor resection by   Oxana    PLAN:  Optimization per NCC/Hospitalist  Seizure management per NCC/Neurology  SBP goal 100-150  PT/OT  Decadron taper as ordered over 2 weeks down to Decadron 2mg BID. GI ppx while on Decadron.   Appreciate Onc recommedations  Awaiting final pathology  DC planning  Okay to discharge home with HHC/HHPT from Neurosurgical standpoint, once cleared by other services.   F/u in Neurosurgery clinic in 2 weeks - our office will reach out on Monday to schedule.     The above plan was discussed with Dr. Madrigal.    Zahida Adames, APRN-NP  [facility]  4/20/2025, 8:05 AM

## 2025-04-20 NOTE — CM/SW NOTE
04/20/25 1400   Discharge disposition   Expected discharge disposition Home-Health   Post Acute Care Provider Residential   Discharge transportation Private car     Notified by RN that pt is ready for discharge.   Plan is home with [facility]- notified liaisonShani of discharge    / to remain available for support and/or discharge planning.     Christine Jeff MBA MSN, RN Case Manager  [phone]

## 2025-04-20 NOTE — PROGRESS NOTES
Oncology Progress note     Bladder cancer on treatment with enfortumab    Now noted to have a brain mass after she presented with MS changes.  Hx of Stage IV bladder cancer with known lung metastases--s/p cystectomy with ileal conduit and wedge resection of TONNY nodules.      HPI:  She continues to feel well, still has some confusion regarding dates/times but overall seems to be improving, currently still on decadron taper    Past Medical History[1]  Social Hx on file[2]  Vitals:    04/20/25 0745   BP: 133/73   Pulse: 57   Resp: 14   Temp: 98 °F (36.7 °C)     GEN: alert; nad.   HEENT: normocephalic  HEART: regular rate  LUNGS: normal breathing effort   eXT: no edema    Current Hospital Medications[3]       A/P:  75 year old with metastatic bladder cancer currently on enfortumab, here with new brain mass, cerebral edema    Brain mass  -presented with confusion  -MRI shows left frontal lobe mass, measuring 5.7 cm with vasogenic edema.   She is now s/p resection and being monitored in [facility] ICU.  Dex taper schedule noted. Pathology pending.  On Pepcid  CT c/a/p does not show any acute findings    2. Metastatic bladder cancer  -on treatment with enfortumab  -? Brain mets vs new cns primary--now post brain resection.  Path pending.    PLAN:  - continues on decadron taper  - awaiting final path  - will need radiation oncology--can be arranged as outpatient, pending clinical course  - outpatient follow up with Dr. Woody after discharge  - will continue to follow    Zach Doyle MD         [1]   Past Medical History:   Cancer (HCC)    CKD (chronic kidney disease), stage III (HCC)    HYPERLIPIDEMIA    Hypertension    OSTEOPENIA    Personal history of bladder cancer    S/P ileoconduit   [2]   Social History  Socioeconomic History    Marital status:     Number of children: 0   Occupational History    Occupation: Semi retired    Tobacco Use    Smoking status: Never    Smokeless tobacco: Never   Vaping Use    Vaping  status: Never Used   Substance and Sexual Activity    Alcohol use: Not Currently     Alcohol/week: 2.0 standard drinks of alcohol     Types: 2 Standard drinks or equivalent per week    Drug use: No   Other Topics Concern    Seat Belt Yes   [3]   levETIRAcetam    LORazepam    glucose **OR** glucose **OR** glucose-vitamin C **OR** dextrose **OR** glucose **OR** glucose **OR** glucose-vitamin C    buPROPion SR    HYDROcodone-acetaminophen    morphINE    acetaminophen    labetalol    hydrALAzine    ondansetron    [COMPLETED] dexamethasone **FOLLOWED BY** dexamethasone **FOLLOWED BY** [START ON 4/23/2025] dexamethasone **FOLLOWED BY** [START ON 4/27/2025] dexamethasone    famotidine **OR** [DISCONTINUED] famotidine    atorvastatin    [Held by provider] heparin    busPIRone    metoprolol tartrate

## 2025-04-20 NOTE — PLAN OF CARE
NURSING DISCHARGE NOTE    Discharged Home via Wheelchair.  Accompanied by Spouse and Support staff  Belongings Taken by patient/family.      Cleared for discharge by all consults. Discharge AVS completed and reviewed with patient. Discussed discharge medications, including purpose, dose, and side effects. Instructed to  scripts sent to pharmacy. Reviewed follow-ups and the importance of maintaining those appointments. Discussed discharge instructions/precautions and when to notify MD vs seek emergency medical care. All questions and concerns addressed appropriately. Pt demonstrates adequate understanding of all instructions.    Problem: Patient/Family Goals  Goal: Patient/Family Long Term Goal  Description: Patient's Long Term Goal: Return home with family, adequate resources, stable neurological status    Interventions:  - PT/OT, neuro checks per protocol  - See additional Care Plan goals for specific interventions  Outcome: Adequate for Discharge  Goal: Patient/Family Short Term Goal  Description: Patient's Short Term Goal: Absence of seizures and complications    Interventions:   - Monitoring, scans per MD  - See additional Care Plan goals for specific interventions  Outcome: Adequate for Discharge     Problem: PAIN - ADULT  Goal: Verbalizes/displays adequate comfort level or patient's stated pain goal  Description: INTERVENTIONS:- Encourage pt to monitor pain and request assistance- Assess pain using appropriate pain scale- Administer analgesics based on type and severity of pain and evaluate response- Implement non-pharmacological measures as appropriate and evaluate response- Consider cultural and social influences on pain and pain management- Manage/alleviate anxiety- Utilize distraction and/or relaxation techniques- Monitor for opioid side effects- Notify MD/LIP if interventions unsuccessful or patient reports new pain- Anticipate increased pain with activity and pre-medicate as  appropriate  4/20/2025 1509 by Norma Castellon RN  Outcome: Adequate for Discharge  4/20/2025 0805 by Norma Castellon RN  Outcome: Progressing     Problem: RISK FOR INFECTION - ADULT  Goal: Absence of fever/infection during anticipated neutropenic period  Description: INTERVENTIONS- Monitor WBC- Administer growth factors as ordered- Implement neutropenic guidelines  4/20/2025 1509 by Norma Castellon RN  Outcome: Adequate for Discharge  4/20/2025 0805 by Norma Castellon RN  Outcome: Progressing     Problem: SAFETY ADULT - FALL  Goal: Free from fall injury  Description: INTERVENTIONS:- Assess pt frequently for physical needs- Identify cognitive and physical deficits and behaviors that affect risk of falls.- [place] fall precautions as indicated by assessment.- Educate pt/family on patient safety including physical limitations- Instruct pt to call for assistance with activity based on assessment- Modify environment to reduce risk of injury- Provide assistive devices as appropriate- Consider OT/PT consult to assist with strengthening/mobility- Encourage toileting schedule  4/20/2025 1509 by Norma Castellon RN  Outcome: Adequate for Discharge  4/20/2025 0805 by Norma Castellon RN  Outcome: Progressing     Problem: DISCHARGE PLANNING  Goal: Discharge to home or other facility with appropriate resources  Description: INTERVENTIONS:- Identify barriers to discharge w/pt and caregiver- Include patient/family/discharge partner in discharge planning- Arrange for needed discharge resources and transportation as appropriate- Identify discharge learning needs (meds, wound care, etc)- Arrange for interpreters to assist at discharge as needed- Consider post-discharge preferences of patient/family/discharge partner- Complete POLST form as appropriate- Assess patient's ability to be responsible for managing their own health- Refer to Case Management Department for coordinating discharge planning if the  patient needs post-hospital services based on physician/LIP order or complex needs related to functional status, cognitive ability or social support system  4/20/2025 1509 by Norma Castellon RN  Outcome: Adequate for Discharge  4/20/2025 0805 by Norma Castellon RN  Outcome: Progressing     Problem: Altered Communication/Language Barrier  Goal: Patient/Family is able to understand and participate in their care  Description: Interventions:- Assess communication ability and preferred communication style- Implement communication aides and strategies- Use visual cues when possible- Listen attentively, be patient, do not interrupt- Minimize distractions- Allow time for understanding and response- Establish method for patient to ask for assistance (call light)- Provide an  as needed- Communicate barriers and strategies to overcome with those who interact with patient  4/20/2025 1509 by Norma Castellon RN  Outcome: Adequate for Discharge  4/20/2025 0805 by Norma Castellon RN  Outcome: Progressing     Problem: SKIN/TISSUE INTEGRITY - ADULT  Goal: Skin integrity remains intact  Description: INTERVENTIONS- Assess and document risk factors for pressure ulcer development- Assess and document skin integrity- Monitor for areas of redness and/or skin breakdown- Initiate interventions, skin care algorithm/standards of care as needed  4/20/2025 1509 by Norma Castellon RN  Outcome: Adequate for Discharge  4/20/2025 0805 by Norma Castellon RN  Outcome: Progressing  Goal: Incision(s), wounds(s) or drain site(s) healing without S/S of infection  Description: INTERVENTIONS:- Assess and document risk factors for pressure ulcer development- Assess and document skin integrity- Assess and document dressing/incision, wound bed, drain sites and surrounding tissue- Implement wound care per orders- Initiate isolation precautions as appropriate- Initiate Pressure Ulcer prevention bundle as  indicated  4/20/2025 1509 by Norma Castellon RN  Outcome: Adequate for Discharge  4/20/2025 0805 by Norma Castellon RN  Outcome: Progressing     Problem: NEUROLOGICAL - ADULT  Goal: Achieves stable or improved neurological status  Description: INTERVENTIONS- Assess for and report changes in neurological status- Initiate measures to prevent increased intracranial pressure- Maintain blood pressure and fluid volume within ordered parameters to optimize cerebral perfusion and minimize risk of hemorrhage- Monitor temperature, glucose, and sodium. Initiate appropriate interventions as ordered  4/20/2025 1509 by Norma Castellon RN  Outcome: Adequate for Discharge  4/20/2025 0805 by Norma Castellon RN  Outcome: Progressing  Goal: Absence of seizures  Description: INTERVENTIONS- Monitor for seizure activity- Administer anti-seizure medications as ordered- Monitor neurological status  4/20/2025 1509 by Norma Castellon RN  Outcome: Adequate for Discharge  4/20/2025 0805 by Norma Castellon RN  Outcome: Progressing  Goal: Remains free of injury related to seizure activity  Description: INTERVENTIONS:- Maintain airway, patient safety  and administer oxygen as ordered- Monitor patient for seizure activity, document and report duration and description of seizure to MD/LIP- If seizure occurs, turn patient to side and suction secretions as needed- Reorient patient post seizure- Seizure pads on all 4 side rails- Instruct patient/family to notify RN of any seizure activity- Instruct patient/family to call for assistance with activity based on assessment  4/20/2025 1509 by Norma Castellon RN  Outcome: Adequate for Discharge  4/20/2025 0805 by Norma Castellon RN  Outcome: Progressing  Goal: Achieves maximal functionality and self care  Description: INTERVENTIONS- Monitor swallowing and airway patency with patient fatigue and changes in neurological status- Encourage and assist patient to increase  activity and self care with guidance from PT/OT- Encourage visually impaired, hearing impaired and aphasic patients to use assistive/communication devices  4/20/2025 1509 by Norma Castellon, RN  Outcome: Adequate for Discharge  4/20/2025 0805 by Norma Castellon, RN  Outcome: Progressing

## 2025-04-20 NOTE — PLAN OF CARE
Problem: PAIN - ADULT  Goal: Verbalizes/displays adequate comfort level or patient's stated pain goal  Description: INTERVENTIONS:- Encourage pt to monitor pain and request assistance- Assess pain using appropriate pain scale- Administer analgesics based on type and severity of pain and evaluate response- Implement non-pharmacological measures as appropriate and evaluate response- Consider cultural and social influences on pain and pain management- Manage/alleviate anxiety- Utilize distraction and/or relaxation techniques- Monitor for opioid side effects- Notify MD/LIP if interventions unsuccessful or patient reports new pain- Anticipate increased pain with activity and pre-medicate as appropriate  Outcome: Progressing     Problem: RISK FOR INFECTION - ADULT  Goal: Absence of fever/infection during anticipated neutropenic period  Description: INTERVENTIONS- Monitor WBC- Administer growth factors as ordered- Implement neutropenic guidelines  Outcome: Progressing     Problem: SAFETY ADULT - FALL  Goal: Free from fall injury  Description: INTERVENTIONS:- Assess pt frequently for physical needs- Identify cognitive and physical deficits and behaviors that affect risk of falls.- [place] fall precautions as indicated by assessment.- Educate pt/family on patient safety including physical limitations- Instruct pt to call for assistance with activity based on assessment- Modify environment to reduce risk of injury- Provide assistive devices as appropriate- Consider OT/PT consult to assist with strengthening/mobility- Encourage toileting schedule  Outcome: Progressing     Problem: DISCHARGE PLANNING  Goal: Discharge to home or other facility with appropriate resources  Description: INTERVENTIONS:- Identify barriers to discharge w/pt and caregiver- Include patient/family/discharge partner in discharge planning- Arrange for needed discharge resources and transportation as appropriate- Identify discharge learning needs (meds,  wound care, etc)- Arrange for interpreters to assist at discharge as needed- Consider post-discharge preferences of patient/family/discharge partner- Complete POLST form as appropriate- Assess patient's ability to be responsible for managing their own health- Refer to Case Management Department for coordinating discharge planning if the patient needs post-hospital services based on physician/LIP order or complex needs related to functional status, cognitive ability or social support system  Outcome: Progressing     Problem: Altered Communication/Language Barrier  Goal: Patient/Family is able to understand and participate in their care  Description: Interventions:- Assess communication ability and preferred communication style- Implement communication aides and strategies- Use visual cues when possible- Listen attentively, be patient, do not interrupt- Minimize distractions- Allow time for understanding and response- Establish method for patient to ask for assistance (call light)- Provide an  as needed- Communicate barriers and strategies to overcome with those who interact with patient  Outcome: Progressing     Problem: SKIN/TISSUE INTEGRITY - ADULT  Goal: Skin integrity remains intact  Description: INTERVENTIONS- Assess and document risk factors for pressure ulcer development- Assess and document skin integrity- Monitor for areas of redness and/or skin breakdown- Initiate interventions, skin care algorithm/standards of care as needed  Outcome: Progressing  Goal: Incision(s), wounds(s) or drain site(s) healing without S/S of infection  Description: INTERVENTIONS:- Assess and document risk factors for pressure ulcer development- Assess and document skin integrity- Assess and document dressing/incision, wound bed, drain sites and surrounding tissue- Implement wound care per orders- Initiate isolation precautions as appropriate- Initiate Pressure Ulcer prevention bundle as indicated  Outcome: Progressing      Problem: NEUROLOGICAL - ADULT  Goal: Achieves stable or improved neurological status  Description: INTERVENTIONS- Assess for and report changes in neurological status- Initiate measures to prevent increased intracranial pressure- Maintain blood pressure and fluid volume within ordered parameters to optimize cerebral perfusion and minimize risk of hemorrhage- Monitor temperature, glucose, and sodium. Initiate appropriate interventions as ordered  Outcome: Progressing  Goal: Absence of seizures  Description: INTERVENTIONS- Monitor for seizure activity- Administer anti-seizure medications as ordered- Monitor neurological status  Outcome: Progressing  Goal: Remains free of injury related to seizure activity  Description: INTERVENTIONS:- Maintain airway, patient safety  and administer oxygen as ordered- Monitor patient for seizure activity, document and report duration and description of seizure to MD/LIP- If seizure occurs, turn patient to side and suction secretions as needed- Reorient patient post seizure- Seizure pads on all 4 side rails- Instruct patient/family to notify RN of any seizure activity- Instruct patient/family to call for assistance with activity based on assessment  Outcome: Progressing  Goal: Achieves maximal functionality and self care  Description: INTERVENTIONS- Monitor swallowing and airway patency with patient fatigue and changes in neurological status- Encourage and assist patient to increase activity and self care with guidance from PT/OT- Encourage visually impaired, hearing impaired and aphasic patients to use assistive/communication devices  Outcome: Progressing

## 2025-04-20 NOTE — PROGRESS NOTES
[facility] Hospitalist Progress Note       SUBJECTIVE:  Awake and alert   No complaints of paiin     OBJECTIVE:  Scheduled Meds: Scheduled Medications[1]  Continuous Infusions: Medication Infusions[2]  PRN Meds: PRN Medications[3]    Vitals  Vitals:    04/20/25 0400   BP: 138/63   Pulse: 59   Resp: 24   Temp: 97.7 °F (36.5 °C)         Exam   Gen-    no acute distress, alert    RESP-   Lungs CTA , normal respiratory effort  CV-      Heart RRR, no mgr  Abd-    soft, nondistended, nontender, bowel sounds present  Skin-     no rash       Labs:     Recent Labs   Lab 04/14/25  0613 04/15/25  0757 04/17/25  0404 04/18/25 0228   WBC 12.1* 13.0* 17.2* 12.8*   HGB 12.1 12.2 11.6* 11.3*   MCV 97.5 95.9 97.2 100.9*   .0 195.0 178.0 151.0   BAND  --   --  2 3   INR  --  0.96  --   --        Recent Labs   Lab 04/14/25  0613 04/15/25  0757 04/17/25  0404 04/18/25  0228    141 141 140   K 4.7 4.4 4.0 4.4    109 110 110   CO2 24.0 23.0 20.0* 22.0   BUN 24* 27* 25* 17   CREATSERUM 1.17* 1.21* 0.99 0.90   CA 9.2 9.1 7.3* 7.6*   MG 2.2  --   --   --    PHOS 3.4  --   --   --    * 129* 132* 121*       Recent Labs   Lab 04/14/25  0613 04/18/25 0228   ALT  --  14   AST  --  21   ALB 3.8 3.3       Recent Labs   Lab 04/19/25  0652 04/19/25  1208 04/19/25  1637 04/20/25  0030 04/20/25  0512   PGLU 115* 212* 151* 131* 138*       AP:  75 year old female with PMH of stage IV bladder cancer with lung metastases s/p cystectomy with ileal conduit and wedge resection of TONNY nodules currently on Enfortumab, HTN, HLD, CKD3b, MDD presenting to the hospital for AMS     Left frontal lobe mass with vasogenic edema  Acute Metabolic Encephalopathy  -Imminent threat to neurologic function given patient's altered mental status, could be primary CNS tumor versus metastases  -CT C/A/P: negative for other metastases     Plan:  - s/p left image guided craniotaomy for brain mass on 4/16, had 90 sec seizure, whole body shocking, was given  ativan on 4/17, pt started on keppra   - steroids per NSG  - path pending   - Per onc:  will need radiation oncology outpatient     Stage IV bladder cancer s/p cystectomy with ileal conduit  Hx of Lung metastases s/p wedge resection of TONNY nodules  Plan:  -Oncology consulted inpatient, recommending eventual radiation as outpatient     Pyuria  UTI/Acute Cystitis - ruled out  -Urostomy likely colonized, no sign/concerns of an acute infection at this time  -Per ID, no need for antibiotics     CKD stage IIIb  Hyperkalemia - resolved  -Serial RFP's     Hyperlipidemia  -Continue home statin     Depression  -Continue home bupropion, buspirone     Hypertension  -Continue home beta-blocker            Prophylaxis:  DVT: restart when ok with nsg        Dispoo; dc today       Tessa Goodman MD   [facility] Hospitalist            [1]    levETIRAcetam  1,000 mg Oral BID    buPROPion SR  200 mg Oral Daily    dexamethasone  4 mg Oral Q8H OFE    Followed by    [START ON 4/23/2025] dexamethasone  4 mg Oral 2 times per day    Followed by    [START ON 4/27/2025] dexamethasone  2 mg Oral 2 times per day    famotidine  20 mg Oral Daily    atorvastatin  20 mg Oral Nightly    [Held by provider] heparin  5,000 Units Subcutaneous 2 times per day    busPIRone  5 mg Oral BID    metoprolol tartrate  25 mg Oral BID   [2] [3]   LORazepam    glucose **OR** glucose **OR** glucose-vitamin C **OR** dextrose **OR** glucose **OR** glucose **OR** glucose-vitamin C    HYDROcodone-acetaminophen    morphINE    acetaminophen    labetalol    hydrALAzine    ondansetron

## 2025-04-22 ENCOUNTER — TELEPHONE (OUTPATIENT)
Dept: SURGERY | Facility: CLINIC | Age: 75
End: 2025-04-22

## 2025-04-22 NOTE — TELEPHONE ENCOUNTER
Patient scheduled for 2 week post op and 6 week post op.    Patient was scheduled during neuro onc clinic for 6 week post op with Dr. Daigle please advise if patient needs to be rescheduled.

## 2025-04-22 NOTE — TELEPHONE ENCOUNTER
----- Message from Zahida Adames sent at 4/19/2025  9:25 AM CDT -----  Hello,Please schedule patient for post-op follow up with me in 2 weeks.Please schedule patient for post-op follow up with Dr. Daigle in 6 weeks. Patient underwent left crani for tumor resection on 4/16/25 by Dr. Daigle.Thank you,Zahida

## 2025-04-22 NOTE — TELEPHONE ENCOUNTER
Received a return call from patient's  Venkatesh who stated that:    He would like to know if they can use shampoo on patient's incision.   Discussed using baby shampoo and gently wash the old blood from the incision area and gently rinse. Venkatesh acknowledged.     1) Asked how patient is feeling in general:  She is not complaining of pain, but she is sleeping a lot.     2) conducted verbal assessment:  Fevers none  chills none  Staple site check:  Redness none  Swelling none  Drainage none  warmth around incision area no    3) Discussed current Pain level with patient:  Pt states pain is currently --/10    Discussed constipation issues. Recommended that Venkatesh use an enema or suppository to alleviate constipation. Venkatesh acknowledged.     4) Discussed pain medication with patient and ascertained if refills were needed at this time, pt stated:  She is not reporting pain.     5) Discussed [place] restrictions:  Showering okay, do not scrub incisions (ok to use baby shampoo), no pools or hot tubs, no submerging incision.    6) Confirmed post op appointments with patient's spouse:    [facility] Future Appointments    Encounter Information   Provider Department Center   4/29/2025 10:30 AM Zahida Adames, [facility]   5/27/2025 2:30 PM Candido Daigle MD       Routed to KAYLEE providers as an update.

## 2025-04-22 NOTE — TELEPHONE ENCOUNTER
Received request from patient's  for a return call. Conducted Post operative outreach call with patient for brain surgery:    Patient underwent LEFT IMAGE GUIDED CRANIOTOMY FOR TUMOR with Dr. Daigle on 4.16.25.   Post op day 6    Called patient's cell phone and reached patient's 's cell phone. Left detailed message on personalized voicemail.

## 2025-04-24 ENCOUNTER — TELEPHONE (OUTPATIENT)
Dept: SURGERY | Facility: CLINIC | Age: 75
End: 2025-04-24

## 2025-04-24 DIAGNOSIS — G93.89 BRAIN MASS: Primary | ICD-10-CM

## 2025-04-24 RX ORDER — DEXAMETHASONE 2 MG/1
TABLET ORAL
Qty: 102 TABLET | Refills: 0 | Status: SHIPPED | OUTPATIENT
Start: 2025-04-24 | End: 2025-05-12

## 2025-04-24 NOTE — TELEPHONE ENCOUNTER
I spoke to the patients - he states that the patient has not had a bowel movement since 4/16/25. He states he's tried giving stool softener and miralax. He also states her confusion/cognition has gotten worse day by day. He states she repeats what he says and theres no conversation. He states she is eating very minimally and he is giving her water 1x per hour to keep her hydrated. He states she fell yesterday onto her hip, denies head injury and then fell again today and \"sprained\" her ankle. He states she is on the 4mg Q8hr dexamethasone schedule currently.     Patient is s/p craniotomy for tumor removal with Dr Daigle.    I spoke to Dr Daigle in the clinic. He recommends the patient go back to 4mg Q6hr dexamethasone for 1 week, then 4mg Q8hr for 4 days, then 4mg Q12hr for 4 days and then 2mg BID until she sees the oncologist who will assume management.     He also suggests he try enema or suppository for bowel movement and if not successful, to bring pt to ER for fecal disimpaction. I also suggested he bring her to ER or UC for hip and ankle evaluation to rule out fractures.

## 2025-04-24 NOTE — TELEPHONE ENCOUNTER
Patients spouse calling to update Dr. Daigle. States he is very worried about patient and says she keeps falling and seems to be confused/behaving differently. She has not had a bowel movement either since before surgery.

## 2025-04-29 ENCOUNTER — OFFICE VISIT (OUTPATIENT)
Dept: SURGERY | Facility: CLINIC | Age: 75
End: 2025-04-29
Payer: MEDICARE

## 2025-04-29 VITALS
HEART RATE: 60 BPM | DIASTOLIC BLOOD PRESSURE: 60 MMHG | WEIGHT: 149 LBS | SYSTOLIC BLOOD PRESSURE: 110 MMHG | HEIGHT: 65 IN | BODY MASS INDEX: 24.83 KG/M2

## 2025-04-29 DIAGNOSIS — N18.32 STAGE 3B CHRONIC KIDNEY DISEASE (HCC): ICD-10-CM

## 2025-04-29 DIAGNOSIS — C78.00 BLADDER CANCER METASTASIZED TO LUNG (HCC): ICD-10-CM

## 2025-04-29 DIAGNOSIS — F32.2 MAJOR DEPRESSIVE DISORDER, SINGLE EPISODE, SEVERE WITHOUT PSYCHOTIC FEATURES (HCC): ICD-10-CM

## 2025-04-29 DIAGNOSIS — Z93.6 S/P ILEAL CONDUIT (HCC): ICD-10-CM

## 2025-04-29 DIAGNOSIS — C67.9 BLADDER CANCER METASTASIZED TO LUNG (HCC): ICD-10-CM

## 2025-04-29 PROCEDURE — 99024 POSTOP FOLLOW-UP VISIT: CPT

## 2025-04-29 RX ORDER — DEXAMETHASONE 2 MG/1
TABLET ORAL
Qty: 48 TABLET | Refills: 0 | Status: SHIPPED | OUTPATIENT
Start: 2025-04-29 | End: 2025-05-11

## 2025-04-29 NOTE — PROGRESS NOTES
[facility]  Neurosurgery Clinic Visit: Post-Op Follow Up  2025    Sujey Medina Patient Status:  No patient class for patient encounter    3/6/1950 MRN [id number]   Location [facility], [facility] Attending No att. providers found   Hosp Day # 0 PCP Rajni Tiwari MD     REASON FOR VISIT: 2 week post-op follow up    SUBJECTIVE     Sujey Medina is a pleasant 75 year old female here for follow up s/p left craniotomy for tumor resection on 25 by Dr. Daigle. Patient was discharged from the hospital on 25. She is accompanied by her . Sujey has been doing well overall since surgery. She reports some intermittent dizziness and has fallen twice secondary to this. She states she did not hit her head or acquire any serious injuries from either fall, however she has some new bruising to the left cheek. She reports ongoing forgetfulness, otherwise denies any new neurological deficits.     MEDICAL HISTORY     Past Medical History[1]   Past Surgical History[2]   Family History[3]   Social Hx on file[4]   Allergies[5]     MEDICATIONS     Current Medications[6]    REVIEW OF SYSTEMS     Denies fever, chills, shortness of breath, chest pain, or calf pain.     PHYSICAL EXAMINATION     LMP  (LMP Unknown)     GENERAL:  No acute distress, non-toxic appearing, speech fluent, mood appropriate    HEENT:  Normocephalic. Bruising to left forehead and left cheek.     RESP: Non-labored, easy, even    CV: NSR on tele    NEUROLOGICAL:  Alert and oriented x 3.  Speech fluent. Comprehension intact.  PERRLA +3 brisk,  EOMI.  VFF.  Face is symmetrical. Tongue is midline.  Uvula and palate elevate symmetrically. Shoulder shrug normal bilaterally. Remaining CN's GI.  Sensation to light touch is intact bilaterally.  Motor: No drift.  Normal rapid alternating movements. Finger-to-nose coordination is intact.  No arm or leg weakness noted, strength 5/5 bilaterally.  Gait deferred.       SKIN; Surgical incision approximated with vicryl sutures. Incision is C/D/I without erythema, edema, or drainage.           IMAGING     No new imaging to review.    ASSESSMENT AND PLAN     ASSESSMENT:  75 year old female s/p left craniotomy for tumor resection on 4/16/25 by Dr. Daigle    PLAN:   Continue to wean narcotics as able.  No driving while taking narcotics.   Wean Decadron as follows: Decadron 4 mg q8h x4 days, 4 mg BID x4 days, 2 mg BID x4 days. Defer further management to Heme/Onc.   Okay to start RT from a Neurosurgical standpoint.  Incisional care and activity restrictions reviewed.  F/u with Dr. Daigle in 4 weeks.  F/u with Dr. Woody for further oncologic care.    Plan of care was discussed with the patient and spouse. Dr. Daigle present during visit. All questions were answered at this time. The patient and spouse verbalized understanding, agrees with the plan, and was appreciative.     Zahida Adames, [facility]  [address], [address]  [place] [address]  [phone]         [1]   Past Medical History:   Cancer (HCC)    CKD (chronic kidney disease), stage III (HCC)    HYPERLIPIDEMIA    Hypertension    OSTEOPENIA    Personal history of bladder cancer    S/P ileoconduit   [2]   Past Surgical History:  Procedure Laterality Date    Benign biopsy right  1988    Colon ca scrn not hi rsk ind N/A 10/28/2015    Procedure: COLONOSCOPY, POSSIBLE BIOPSY, POSSIBLE POLYPECTOMY 52127;  Surgeon: Lai Heard MD;  Location: [facility]    Colonoscopy  6/03    diverticulosis, hemorrhoids    Colonoscopy,diagnostic  10/28/15    diverticulosis    Cystoscopy,insert ureteral stent  3/12/10    Performed by BARNEY IZAGUIRRE at [facility], [facility]    Cystoscopy,insert ureteral stent  9/10/2010    Performed by BARNEY IZAGUIRRE at [facility]    Cystoscopy,remv calculus,simple  11/19/2010    Performed by BARNEY IZAGUIRRE at [facility],  [facility]    Cystourethroscopy  3/2/2011    Performed by APRIL NO at [facility], [facility]    Cystourethroscopy,biopsy  11/19/2010    Performed by BARNEY IZAGUIRRE at [facility], [facility]    Cystourethroscopy,fulgur 2-5cm lesn  3/12/10    Performed by BARNEY IZAGUIRRE at [facility], [facility]    Cystourethroscopy,fulgur 2-5cm lesn  9/10/2010    Performed by BARNEY IZAGUIRRE at [facility], [facility]    Eeg phy/qhp ea incr w/veeg  4/18/2025    Fluor gid ctr vad plmt rplcmt/rmvl  3/14/2011    Performed by TANESHA ALEXANDRE at [facility]    Insertion, tunneled centrally inserted venous access device, w/subq port; >5 years  3/14/2011    Performed by TANESHA ALEXANDRE at [facility], [facility]    Other surgical history  22 yrs ago    right breast bx    Other surgical history  2-23-09    cysto, Dr. Izaguirre    Other surgical history  6-3-10    cysto-poss [id number]-Jg. Merrick    Other surgical history  9/10/10    cysto, URS, RPG, TURBT, stent exchange-Dr. Izaguirre    Other surgical history  2/8/11    cysto- Dr. Izaguirre    Other surgical history      thoracic surgery    Other surgical history  11/20/13     VATS RUL    Patient documented not to have experienced any of the following events N/A 10/28/2015    Procedure: COLONOSCOPY, POSSIBLE BIOPSY, POSSIBLE POLYPECTOMY 07152;  Surgeon: Lai Heard MD;  Location: [facility]    Patient withough preoperative order for iv antibiotic surgical site infection prophylaxis. N/A 10/28/2015    Procedure: COLONOSCOPY, POSSIBLE BIOPSY, POSSIBLE POLYPECTOMY 99253;  Surgeon: Lai Heard MD;  Location: [facility]    Removal of tunneled cva device, with subq port or pump, central or peripheral insertion  12/17/2013    Procedure: PORT-A-CATH REMOVAL;  Surgeon: Bobby Palmer MD;  Location: [facility]    Skin surgery  06/13/2019    MOHS, BCC - nasal dorsum, Dr. PATRICIA Ghosh    Tubal ligation      X-ray retrograde  pyelogram  3/12/10    Performed by BARNEY IZAGUIRRE at [facility] [place], [facility]    X-ray retrograde pyelogram  9/10/2010    Performed by BARNEY IZAGUIRRE at [facility], [facility]   [3]   Family History  Problem Relation Age of Onset    Other (Other) Father         suicide    Heart Disorder Mother     Heart Disorder Maternal Grandmother     Other (Other) Sister         mentally disabled    Cancer Neg    [4]   Social History  Socioeconomic History    Marital status:     Number of children: 0   Occupational History    Occupation: Semi retired    Tobacco Use    Smoking status: Never    Smokeless tobacco: Never   Vaping Use    Vaping status: Never Used   Substance and Sexual Activity    Alcohol use: Not Currently     Alcohol/week: 2.0 standard drinks of alcohol     Types: 2 Standard drinks or equivalent per week    Drug use: No   Other Topics Concern    Seat Belt Yes   [5] No Known Allergies  [6]   No outpatient medications have been marked as taking for the 4/29/25 encounter (Office Visit) with Zahida Adames APRN.

## 2025-04-29 NOTE — PATIENT INSTRUCTIONS
Refill policies:    Allow 2-3 business days for refills; controlled substances may take longer.  Contact your pharmacy at least 5 days prior to running out of medication and have them send an electronic request or submit request through the “request refill” option in your [employer] account.  Refills are not addressed on weekends; covering physicians do not authorize routine medications on weekends.  No narcotics or controlled substances are refilled after noon on Fridays or by on call physicians.  By law, narcotics must be electronically prescribed.  A 30 day supply with no refills is the maximum allowed.  If your prescription is due for a refill, you may be due for a follow up appointment.  To best provide you care, patients receiving routine medications need to be seen at least once a year.  Patients receiving narcotic/controlled substance medications need to be seen at least once every 3 months.  In the event that your preferred pharmacy does not have the requested medication in stock (e.g. Backordered), it is your responsibility to find another pharmacy that has the requested medication available.  We will gladly send a new prescription to that pharmacy at your request.    Scheduling Tests:    If your physician has ordered radiology tests such as MRI or CT scans, please contact Central Scheduling at [phone] right away to schedule the test.  Once scheduled, the [facility] Centralized Referral Team will work with your insurance carrier to obtain pre-certification or prior authorization.  Depending on your insurance carrier, approval may take 3-10 days.  It is highly recommended patients assure they have received an authorization before having a test performed.  If test is done without insurance authorization, patient may be responsible for the entire amount billed.      Precertification and Prior Authorizations:  If your physician has recommended that you have a procedure or additional testing performed the [facility]  Centralized Referral Team will contact your insurance carrier to obtain pre-certification or prior authorization.    You are strongly encouraged to contact your insurance carrier to verify that your procedure/test has been approved and is a COVERED benefit.  Although the [facility] Centralized Referral Team does its due diligence, the insurance carrier gives the disclaimer that \"Although the procedure is authorized, this does not guarantee payment.\"    Ultimately the patient is responsible for payment.   Thank you for your understanding in this matter.  Paperwork Completion:  If you require FMLA or disability paperwork for your recovery, please make sure to either drop it off or have it faxed to our office at [phone]. Be sure the form has your name and date of birth on it.  The form will be faxed to our Forms Department and they will complete it for you.  There is a 25$ fee for all forms that need to be filled out.  Please be aware there is a 10-14 day turnaround time.  You will need to sign a release of information (CHRISTIAN) form if your paperwork does not come with one.  You may call the Forms Department with any questions at [phone].  Their fax number is [phone].

## 2025-04-29 NOTE — PROGRESS NOTES
Established patient:  Reason for follow up: 2 week post LEFT IMAGE GUIDED CRANIOTOMY FOR TUMOR     Numeric Rating Scale:       Pain at Present:  0/10           Most recent treatments for Current Pain Condition:   Surgery      New imaging or testing since your last office visit:       MRI BRAIN (W+WO)

## 2025-05-12 ENCOUNTER — APPOINTMENT (OUTPATIENT)
Dept: CT IMAGING | Facility: HOSPITAL | Age: 75
End: 2025-05-12
Payer: MEDICARE

## 2025-05-12 ENCOUNTER — HOSPITAL ENCOUNTER (EMERGENCY)
Facility: HOSPITAL | Age: 75
Discharge: HOME | End: 2025-05-13
Attending: EMERGENCY MEDICINE
Payer: MEDICARE

## 2025-05-12 ENCOUNTER — TELEPHONE (OUTPATIENT)
Dept: SURGERY | Facility: CLINIC | Age: 75
End: 2025-05-12

## 2025-05-12 DIAGNOSIS — Z87.898 HISTORY OF SEIZURE: ICD-10-CM

## 2025-05-12 DIAGNOSIS — R26.81 UNSTEADY GAIT: ICD-10-CM

## 2025-05-12 DIAGNOSIS — R06.82 TACHYPNEA: ICD-10-CM

## 2025-05-12 DIAGNOSIS — S09.90XA INJURY OF HEAD, INITIAL ENCOUNTER: Primary | ICD-10-CM

## 2025-05-12 PROCEDURE — 99284 EMERGENCY DEPT VISIT MOD MDM: CPT

## 2025-05-12 PROCEDURE — 70450 CT HEAD/BRAIN W/O DYE: CPT

## 2025-05-12 RX ORDER — LEVETIRACETAM 1000 MG/1
1000 TABLET ORAL 2 TIMES DAILY
Qty: 60 TABLET | Refills: 0 | Status: SHIPPED | OUTPATIENT
Start: 2025-05-12 | End: 2025-06-11

## 2025-05-12 NOTE — ED INITIAL ASSESSMENT (HPI)
PT states mechanical fall today, called EMS for lift assist as she and he  could not manage for her to stand up, brain surgery two weeks ago ( cancer) [facility] recommended transport to ED for further evaluation. PT reports no pain, feel her baseline ( weakness due to cancer treatments/ surgery) .

## 2025-05-12 NOTE — TELEPHONE ENCOUNTER
Patient's  calling with concerns of Keppra. He states patient has been in bed for two days with dizziness, fatigue, shaking and is unable to walk. He was told by Dr. Daigle patient should be on it for a few days but not too long.     Please advise

## 2025-05-12 NOTE — TELEPHONE ENCOUNTER
Recommend she go to the ED for further evaluation.  should also make Oncology aware of her new symptoms, thanks

## 2025-05-12 NOTE — TELEPHONE ENCOUNTER
I called patient's spouse. Also recommended ED to rule out PE. Keppra was initially managed by Neuro and she needs to follow up with Neuro. Patient's spouse will check with [facility].

## 2025-05-12 NOTE — TELEPHONE ENCOUNTER
Spoke to , patient has difficulty walking. She is out of breath when walking, no chest pain,  states she has no pain. Her hands shake when she is holding a fork. She gets dizzy when she gets up. Patient has been in bed for 2 days. No energy. Patient was doing fine 2 weeks ago, they were going out shopping and patient was getting around. But now patient is worse.  not sure if it's medication, he is concerned it could be the Keppra or steroid. Unsure if it's just related to her surgery.     Patient had craniotomy for tumor resection on 4/16/25 with Dr. Daigle. Last seen by Zahida GLOVER on 4/29/25. Assessment and plan below.     ASSESSMENT:  75 year old female s/p left craniotomy for tumor resection on 4/16/25 by Dr. Daigle     PLAN:   Continue to wean narcotics as able.  No driving while taking narcotics.   Wean Decadron as follows: Decadron 4 mg q8h x4 days, 4 mg BID x4 days, 2 mg BID x4 days. Defer further management to Heme/Onc.   Okay to start RT from a Neurosurgical standpoint.  Incisional care and activity restrictions reviewed.  F/u with Dr. Daigle in 4 weeks.  F/u with Dr. Woody for further oncologic care.

## 2025-05-13 ENCOUNTER — APPOINTMENT (OUTPATIENT)
Dept: GENERAL RADIOLOGY | Facility: HOSPITAL | Age: 75
End: 2025-05-13
Attending: EMERGENCY MEDICINE
Payer: MEDICARE

## 2025-05-13 VITALS
HEART RATE: 81 BPM | BODY MASS INDEX: 23.32 KG/M2 | HEIGHT: 65 IN | OXYGEN SATURATION: 95 % | TEMPERATURE: 98 F | WEIGHT: 140 LBS | RESPIRATION RATE: 22 BRPM | DIASTOLIC BLOOD PRESSURE: 70 MMHG | SYSTOLIC BLOOD PRESSURE: 102 MMHG

## 2025-05-13 PROCEDURE — 71045 X-RAY EXAM CHEST 1 VIEW: CPT | Performed by: EMERGENCY MEDICINE

## 2025-05-13 NOTE — ED QUICK NOTES
ED case manager contacted. Informed her how pt has been told by neuro surgeon that she needs to follow-up with [facility] for new and worsening symptoms. Pt is also almost out of her keppra with no follow-up in the near future. [facility] told  they can't get an appointment for her until July 28th. Case management will have day shift call [facility] and get an earlier appointment for her. Will call  with new appt time.

## 2025-05-13 NOTE — CM/SW NOTE
KELLY assistance was requested to assist patient with making an appointment with [facility]. Pt  informed ED MD the soonest appt he could get was in July and due to pt condition she needs to be seen asap.    KELLY called [facility] and spoke to Raymundo. Soonest appt is August. Raymundo transferred CM to Leatha, nurse who states that they have no appointments available. Leatha will send Dr. Weller and Dr. Salmon a message to see if one of them are able to see the patient sooner. Per Leatha, she will cooridnate with the patients .    KELLY notified Venkatesh pt's .

## 2025-05-13 NOTE — ED PROVIDER NOTES
Patient Seen in: [facility] Emergency Department      History     Chief Complaint   Patient presents with    Fall    Head Injury    Cancer     Stated Complaint: Fall, hit head post brain surgery    Subjective:   Patient 75-year-old female who presents emergency room for recent head injury.  Patient has had multiple falls since discharge from the hospital she had been recently hospitalized for brain tumor with craniotomy.  Patient is currently on seizure medicine as she did have a seizure after her procedure.  However she was only given prescription for Keppra for eczema days and is almost out of them.  She does not have follow-up scheduled with neurology.  Patient family is at bedside and they stated they were never given follow-up with neurology they called today and were told the soonest appointment was July 27.  I explained to the patient that we will get a hold of case management to try to get her a sooner follow-up and I will be happy to refill it Keppra in the meantime however she may be better suited with a different seizure medication if she is having balance instability.    The history is provided by the patient and the spouse.     History of Present Illness                  Objective:     Past Medical History:    Cancer (HCC)    CKD (chronic kidney disease), stage III (HCC)    HYPERLIPIDEMIA    Hypertension    OSTEOPENIA    Personal history of bladder cancer    S/P ileoconduit              Past Surgical History:   Procedure Laterality Date    Benign biopsy right  1988    Colon ca scrn not hi rsk ind N/A 10/28/2015    Procedure: COLONOSCOPY, POSSIBLE BIOPSY, POSSIBLE POLYPECTOMY 18903;  Surgeon: Lai Heard MD;  Location: [facility]    Colonoscopy  6/03    diverticulosis, hemorrhoids    Colonoscopy,diagnostic  10/28/15    diverticulosis    Cystoscopy,insert ureteral stent  3/12/10    Performed by BARNEY IZAGUIRRE at [facility]    Cystoscopy,insert ureteral stent   9/10/2010    Performed by BARNEY IZAGUIRRE at [facility]    Cystoscopy,remv calculus,simple  11/19/2010    Performed by BARNEY IZAGUIRRE at [facility]    Cystourethroscopy  3/2/2011    Performed by APRIL NO at [facility]    Cystourethroscopy,biopsy  11/19/2010    Performed by BARNEY IZAGUIRRE at [facility]    Cystourethroscopy,fulgur 2-5cm lesn  3/12/10    Performed by BARNEY IZAGUIRRE at [facility]    Cystourethroscopy,fulgur 2-5cm lesn  9/10/2010    Performed by BARNEY IZAGUIRRE at [facility]    Eeg phy/qhp ea incr w/veeg  4/18/2025    Fluor gid ctr vad plmt rplcmt/rmvl  3/14/2011    Performed by TANESHA ALEXANDRE at [facility]    Insertion, tunneled centrally inserted venous access device, w/subq port; >5 years  3/14/2011    Performed by TANESHA ALEXANDRE at [facility]    Other surgical history  22 yrs ago    right breast bx    Other surgical history  2-23-09    sharatho, Dr. Izaguirre    Other surgical history  6-3-10    cysto-poss VTHDX-LFG-Hj. Merrick    Other surgical history  9/10/10    cysto, URS, RPG, TURBT, stent exchange-Dr. Izaguirre    Other surgical history  2/8/11    cysto- Dr. Izaguirre    Other surgical history      thoracic surgery    Other surgical history  11/20/13     VATS RUL    Patient documented not to have experienced any of the following events N/A 10/28/2015    Procedure: COLONOSCOPY, POSSIBLE BIOPSY, POSSIBLE POLYPECTOMY 38298;  Surgeon: Lai Heard MD;  Location: [facility]    Patient withough preoperative order for iv antibiotic surgical site infection prophylaxis. N/A 10/28/2015    Procedure: COLONOSCOPY, POSSIBLE BIOPSY, POSSIBLE POLYPECTOMY 19184;  Surgeon: aLi Heard MD;  Location: [facility]    Removal of tunneled cva device, with subq port or pump, central or peripheral insertion  12/17/2013    Procedure: PORT-A-CATH REMOVAL;  Surgeon:  Bobby Palmer MD;  Location: [facility]    Skin surgery  06/13/2019    MOHS, BCC - nasal dorsum, Dr. PATRICIA Ghosh    Tubal ligation      X-ray retrograde pyelogram  3/12/10    Performed by BARNEY IZAGUIRRE at [facility]    X-ray retrograde pyelogram  9/10/2010    Performed by BARNEY IZAGUIRRE at [facility]                Social History     Socioeconomic History    Marital status:     Number of children: 0   Occupational History    Occupation: Semi retired    Tobacco Use    Smoking status: Never    Smokeless tobacco: Never   Vaping Use    Vaping status: Never Used   Substance and Sexual Activity    Alcohol use: Not Currently     Alcohol/week: 2.0 standard drinks of alcohol     Types: 2 Standard drinks or equivalent per week    Drug use: No   Other Topics Concern    Seat Belt Yes   Social History Narrative    Semi retired . . G0     Social Drivers of Health     Food Insecurity: No Food Insecurity (4/10/2025)    NCSS - Food Insecurity     Worried About Running Out of Food in the Last Year: No     Ran Out of Food in the Last Year: No   Transportation Needs: No Transportation Needs (4/10/2025)    NCSS - Transportation     Lack of Transportation: No   Housing Stability: Not At Risk (4/10/2025)    NCSS - Housing/Utilities     Has Housing: Yes     Worried About Losing Housing: No     Unable to Get Utilities: No                                Physical Exam     ED Triage Vitals   BP 05/12/25 1830 90/59   Pulse 05/12/25 1830 91   Resp 05/12/25 1830 16   Temp 05/12/25 1830 97.4 °F (36.3 °C)   Temp src 05/12/25 1944 Temporal   SpO2 05/12/25 1830 93 %   O2 Device 05/12/25 1830 None (Room air)       Current Vitals:   Vital Signs  BP: 102/70  Pulse: 81  Resp: 22  Temp: 97.7 °F (36.5 °C)  Temp src: Temporal  MAP (mmHg): 81    Oxygen Therapy  SpO2: 95 %  O2 Device: None (Room air)          Physical Exam  Vitals and nursing note reviewed.   Constitutional:        General: She is not in acute distress.     Appearance: Normal appearance. She is normal weight. She is not toxic-appearing.   HENT:      Head: Normocephalic.      Comments: Staples in place on forehead from recent craniotomy no signs of infection bruising on the left side of face  Eyes:      Extraocular Movements: Extraocular movements intact.      Pupils: Pupils are equal, round, and reactive to light.   Cardiovascular:      Rate and Rhythm: Normal rate and regular rhythm.      Pulses: Normal pulses.   Pulmonary:      Effort: Pulmonary effort is normal.      Breath sounds: Normal breath sounds.   Abdominal:      General: Bowel sounds are normal. There is no distension.      Palpations: Abdomen is soft.      Tenderness: There is no abdominal tenderness. There is no guarding.   Musculoskeletal:         General: No swelling. Normal range of motion.   Skin:     General: Skin is warm.      Capillary Refill: Capillary refill takes less than 2 seconds.   Neurological:      General: No focal deficit present.      Mental Status: She is alert and oriented to person, place, and time.      Cranial Nerves: No cranial nerve deficit.      Sensory: No sensory deficit.      Motor: No weakness.   Psychiatric:         Mood and Affect: Mood normal.         Behavior: Behavior normal.                 ED Course   Labs Reviewed - No data to display       Results            CT BRAIN OR HEAD (CPT=70450)  Result Date: 5/12/2025            PROCEDURE:  CT BRAIN OR HEAD (37476)  COMPARISON:  [facility] , CT, CT BRAIN OR HEAD (76908), 4/17/2025, 0:54 AM.  INDICATIONS:  Discussed with Dr. Sky, no neck pain, no LOC, no blood thinners  TECHNIQUE:  Noncontrast CT scanning is performed through the brain. Dose reduction techniques were used. Dose information is transmitted to the ACR (American College of Radiology) NRDR (National Radiology Data Registry) which includes the Dose Index Registry.  PATIENT STATED HISTORY: (As transcribed by Technologist)   Patient presents for evaluation of mechanical fall that occured today, hit head. History of brain surgery x2 weeks ago.    FINDINGS: Sequelae of left frontal craniotomy is noted.  Vasogenic in left frontal lobe is noted.  Volume loss the left frontal lobe is noted.  Previously noted region of pneumocephalus with volume loss now measures 2.2 x 1.5 cm.  Extra-axial intermediate density material adjacent to the craniotomy site measures 6 mm.  Lucencies in the deep periventricular white matter are likely sequelae of chronic small vessel ischemic disease. Mild prominence of the sulci. Visualized portions of paranasal sinuses are unremarkable. Visualized portions of the mastoid air cells are unremarkable. Visualized portions of the orbits are unremarkable. IMPRESSION: Left frontal craniotomy is noted.  Vasogenic edema and regions of volume loss are again identified.  Extra-axial material adjacent to the left frontal craniotomy is likely postoperative in nature.  Decreased pneumocephalus.    LOCATION:  [facility]   Dictated by (CST): Magdi Martinez MD on 5/12/2025 at 7:20 PM     Finalized by (CST): Magdi Martinez MD on 5/12/2025 at 7:24 PM               Chest x-ray shows mild central pulmonary vascular prominence trace fluid thickening in the right minor fissure correlate with BMP.  Minimal hazy groundglass lateral left lower lung this appearance may be attenuated by mild right anterior oblique positioning and large body habitus.  Surgical suture lines obliquely in the right central long and vertical left long upper.  Right sided venous port catheter tip in the superior vena cava no pneumothorax crossing upper chest wire.  Minimal right anterior oblique positioning.  No free fluid air upper abdomen          MDM      Social -negative tobacco, negative etoh, negative drugs  Family History-noncontributory  Past Medical History-chronic kidney disease osteopenia hypertension, cancer, recent brain surgery,  hyperlipidemia    Differential diagnosis before testing included head injury, intracranial hemorrhage, fracture, medication side effects    Co-morbidities that add to the complexity of management include: Patient is follow-up with neurology as outpatient for refill of her Keppra however she does not have he had an appointment for this since appointment the  was able to get when he called today it was the end of July.  Will have case management try and assist    Testing ordered during this visit included CT scan of brain    Radiographic images  I personally reviewed the radiographs and my individual interpretation shows no intracranial hemorrhage  I also reviewed the official reports that showed CT BRAIN OR HEAD (CPT=70450)  Result Date: 5/12/2025            PROCEDURE:  CT BRAIN OR HEAD ([id number])  COMPARISON:  [facility] , CT, CT BRAIN OR HEAD (71013), 4/17/2025, 0:54 AM.  INDICATIONS:  Discussed with Dr. Sky, no neck pain, no LOC, no blood thinners  TECHNIQUE:  Noncontrast CT scanning is performed through the brain. Dose reduction techniques were used. Dose information is transmitted to the ACR (American College of Radiology) NRDR (National Radiology Data Registry) which includes the Dose Index Registry.  PATIENT STATED HISTORY: (As transcribed by Technologist)  Patient presents for evaluation of mechanical fall that occured today, hit head. History of brain surgery x2 weeks ago.    FINDINGS: Sequelae of left frontal craniotomy is noted.  Vasogenic in left frontal lobe is noted.  Volume loss the left frontal lobe is noted.  Previously noted region of pneumocephalus with volume loss now measures 2.2 x 1.5 cm.  Extra-axial intermediate density material adjacent to the craniotomy site measures 6 mm.  Lucencies in the deep periventricular white matter are likely sequelae of chronic small vessel ischemic disease. Mild prominence of the sulci. Visualized portions of paranasal sinuses are unremarkable. Visualized  portions of the mastoid air cells are unremarkable. Visualized portions of the orbits are unremarkable. IMPRESSION: Left frontal craniotomy is noted.  Vasogenic edema and regions of volume loss are again identified.  Extra-axial material adjacent to the left frontal craniotomy is likely postoperative in nature.  Decreased pneumocephalus.    LOCATION:  [facility]   Dictated by (CST): Magdi Martinez MD on 5/12/2025 at 7:20 PM     Finalized by (CST): Magdi Martinez MD on 5/12/2025 at 7:24 PM        Chest x-ray shows mild central pulmonary vascular prominence trace fluid thickening in the right minor fissure correlate with BMP.  Minimal hazy groundglass lateral left lower lung this appearance may be attenuated by mild right anterior oblique positioning and large body habitus.  Surgical suture lines obliquely in the right central long and vertical left long upper.  Right sided venous port catheter tip in the superior vena cava no pneumothorax crossing upper chest wire.  Minimal right anterior oblique positioning.  No free fluid air upper abdomen       External chart review showed review of Care Everywhere in epic system shows patient was admitted in April for brain mass and had recent craniotomy.  She was prescribed Keppra twice daily dosing however she is almost out of those medications.    History obtained by an independent source included from patient, family    Discussion of management with patient, family, case management    Social determinants of health that affect care include not applicable      Medications Provided: Keppra    Course of Events during Emergency Room Visit include 75-year-old female presents emergency room for evaluation after fall with head injury.  Patient has no intracranial hemorrhage thankfully on CT.  Patient will be given a walker to help with gait stability and to help decrease her fall risk.  Patient to follow-up with neurology for refill on medications but I will give her a refill in  case there is a delay in getting her in will also have case management contacted to try and increase the speed Baldemar of getting into see neurology as outpatient.    Patient's chest x-ray was obtained after nursing felt the patient was mildly tachypneic this is likely secondary to atelectasis as she is satting well.  Chest x-ray was read as possible vascular prominence however patient satting well.  Will recommend follow-up with primary care physician she tolerated walking with the walker well.  Will discharge home with case management spoke with family regarding attempting to get sooner appointment for neurology    Will give incentive spirometer for home to help patient be more active with her respiratory function as she has not been superactive per family we do not want the patient getting pneumonia.  Disposition:        Discharge  I have discussed with the patient the results of test, differential diagnosis, treatment plan, warning signs and symptoms which should prompt immediate return.  They expressed understanding of these instructions and agrees to the following plan provided.  They were given written discharge instructions and agrees to return for any concerns and voiced understanding and all questions were answered.           Medical Decision Making      Disposition and Plan     Clinical Impression:  1. Injury of head, initial encounter    2. Unsteady gait    3. History of seizure    4. Tachypnea         Disposition:  There is no disposition on file for this visit.  There is no disposition time on file for this visit.    Follow-up:  Rajni Tiwari MD  [address]  [facility] [address] [phone]    Schedule an appointment as soon as possible for a visit      Ervin Capps MD  [address]  [facility] [address] [phone]    Schedule an appointment as soon as possible for a visit            Medications Prescribed:  Current Discharge Medication List        START taking these  medications    Details   !! levETIRAcetam 1000 MG Oral Tab Take 1 tablet (1,000 mg total) by mouth 2 (two) times daily.  Qty: 60 tablet, Refills: 0       !! - Potential duplicate medications found. Please discuss with provider.                Supplementary Documentation:

## 2025-05-13 NOTE — CM/SW NOTE
EDCM WAS CALLED BY ED RN TO PLEASE CALL [facility] AND GET PATIENT AN APPOINTMENT ASAP.  PATIENTS  CALLED AND THEY COULDN'T GET HER IN UNTIL JULY 28 TH. PATIENT NEEDS TO BE SEEN IMMEDIATELY SO THEY CAN REGULATE AND RECONCILE HER MEDICATIONS.    EDCM WILL CALL THE OFFICE TOMORROW MORNING AND REQUEST AN APPOINTMENT FOR THIS PATIENT SOONER THAN JULY 28TH.      EDCM WILL CALL PATIENTS  EMANUEL WITH THE DATE AND TIME.      Marybel Francois MSN, ALIA, RN  Emergency Department   [facility] [phone]

## 2025-05-15 ENCOUNTER — TELEPHONE (OUTPATIENT)
Dept: CASE MANAGEMENT | Facility: HOSPITAL | Age: 75
End: 2025-05-15

## 2025-05-15 ENCOUNTER — HOSPITAL ENCOUNTER (INPATIENT)
Facility: HOSPITAL | Age: 75
LOS: 12 days | Discharge: NURSING, REHAB OR LONG-TERM CARE | End: 2025-05-27
Attending: STUDENT IN AN ORGANIZED HEALTH CARE EDUCATION/TRAINING PROGRAM | Admitting: INTERNAL MEDICINE
Payer: MEDICARE

## 2025-05-15 PROBLEM — G06.2: Status: ACTIVE | Noted: 2025-05-15

## 2025-05-15 LAB
CREAT BLD-MCNC: 1.14 MG/DL (ref 0.55–1.02)
EGFRCR SERPLBLD CKD-EPI 2021: 50 ML/MIN/1.73M2 (ref 60–?)

## 2025-05-15 PROCEDURE — 94760 N-INVAS EAR/PLS OXIMETRY 1: CPT

## 2025-05-15 PROCEDURE — 82565 ASSAY OF CREATININE: CPT | Performed by: STUDENT IN AN ORGANIZED HEALTH CARE EDUCATION/TRAINING PROGRAM

## 2025-05-15 RX ORDER — VANCOMYCIN HYDROCHLORIDE
25 ONCE
Status: DISCONTINUED | OUTPATIENT
Start: 2025-05-15 | End: 2025-05-15

## 2025-05-15 RX ORDER — DEXAMETHASONE 2 MG/1
4 TABLET ORAL 2 TIMES DAILY
Status: COMPLETED | OUTPATIENT
Start: 2025-05-15 | End: 2025-05-18

## 2025-05-15 RX ORDER — METRONIDAZOLE 500 MG/100ML
500 INJECTION, SOLUTION INTRAVENOUS EVERY 6 HOURS
Status: DISCONTINUED | OUTPATIENT
Start: 2025-05-15 | End: 2025-05-15

## 2025-05-15 RX ORDER — LEVETIRACETAM 500 MG/1
1000 TABLET ORAL 2 TIMES DAILY
Status: DISCONTINUED | OUTPATIENT
Start: 2025-05-15 | End: 2025-05-27

## 2025-05-15 RX ORDER — HEPARIN SODIUM 5000 [USP'U]/ML
5000 INJECTION, SOLUTION INTRAVENOUS; SUBCUTANEOUS EVERY 12 HOURS SCHEDULED
Status: DISCONTINUED | OUTPATIENT
Start: 2025-05-15 | End: 2025-05-27

## 2025-05-15 RX ORDER — METRONIDAZOLE 500 MG/100ML
500 INJECTION, SOLUTION INTRAVENOUS EVERY 6 HOURS
Status: DISCONTINUED | OUTPATIENT
Start: 2025-05-15 | End: 2025-05-16

## 2025-05-15 RX ORDER — ACETAMINOPHEN 500 MG
500 TABLET ORAL EVERY 4 HOURS PRN
Status: DISCONTINUED | OUTPATIENT
Start: 2025-05-15 | End: 2025-05-27

## 2025-05-15 RX ORDER — DEXAMETHASONE 2 MG/1
2 TABLET ORAL 2 TIMES DAILY
Status: COMPLETED | OUTPATIENT
Start: 2025-05-19 | End: 2025-05-22

## 2025-05-16 LAB
ANION GAP SERPL CALC-SCNC: 11 MMOL/L (ref 0–18)
BASOPHILS # BLD: 0 X10(3) UL (ref 0–0.2)
BASOPHILS NFR BLD: 0 %
BUN BLD-MCNC: 18 MG/DL (ref 9–23)
CALCIUM BLD-MCNC: 8.3 MG/DL (ref 8.7–10.6)
CHLORIDE SERPL-SCNC: 105 MMOL/L (ref 98–112)
CO2 SERPL-SCNC: 17 MMOL/L (ref 21–32)
CREAT BLD-MCNC: 1.03 MG/DL (ref 0.55–1.02)
CREAT BLD-MCNC: 1.03 MG/DL (ref 0.55–1.02)
CRP SERPL-MCNC: 30.2 MG/DL (ref ?–0.5)
EGFRCR SERPLBLD CKD-EPI 2021: 56 ML/MIN/1.73M2 (ref 60–?)
EGFRCR SERPLBLD CKD-EPI 2021: 56 ML/MIN/1.73M2 (ref 60–?)
EOSINOPHIL # BLD: 0.07 X10(3) UL (ref 0–0.7)
EOSINOPHIL NFR BLD: 1 %
ERYTHROCYTE [DISTWIDTH] IN BLOOD BY AUTOMATED COUNT: 15.3 %
ERYTHROCYTE [SEDIMENTATION RATE] IN BLOOD: 87 MM/HR (ref 0–30)
GLUCOSE BLD-MCNC: 144 MG/DL (ref 70–99)
HCT VFR BLD AUTO: 31 % (ref 35–48)
HGB BLD-MCNC: 10.8 G/DL (ref 12–16)
LYMPHOCYTES NFR BLD: 0.54 X10(3) UL (ref 1–4)
LYMPHOCYTES NFR BLD: 8 %
MCH RBC QN AUTO: 33.6 PG (ref 26–34)
MCHC RBC AUTO-ENTMCNC: 34.8 G/DL (ref 31–37)
MCV RBC AUTO: 96.6 FL (ref 80–100)
MONOCYTES # BLD: 0 X10(3) UL (ref 0.1–1)
MONOCYTES NFR BLD: 0 %
MORPHOLOGY: NORMAL
NEUTROPHILS # BLD AUTO: 5.94 X10 (3) UL (ref 1.5–7.7)
NEUTROPHILS NFR BLD: 82 %
NEUTS BAND NFR BLD: 9 %
NEUTS HYPERSEG # BLD: 6.19 X10(3) UL (ref 1.5–7.7)
OSMOLALITY SERPL CALC.SUM OF ELEC: 280 MOSM/KG (ref 275–295)
PLATELET # BLD AUTO: 113 10(3)UL (ref 150–450)
PLATELET MORPHOLOGY: NORMAL
PLATELETS.RETICULATED NFR BLD AUTO: 2.1 % (ref 0–7)
POTASSIUM SERPL-SCNC: 3.6 MMOL/L (ref 3.5–5.1)
POTASSIUM SERPL-SCNC: 3.9 MMOL/L (ref 3.5–5.1)
RBC # BLD AUTO: 3.21 X10(6)UL (ref 3.8–5.3)
SODIUM SERPL-SCNC: 133 MMOL/L (ref 136–145)
TOTAL CELLS COUNTED BLD: 100
WBC # BLD AUTO: 6.8 X10(3) UL (ref 4–11)

## 2025-05-16 PROCEDURE — 85025 COMPLETE CBC W/AUTO DIFF WBC: CPT | Performed by: STUDENT IN AN ORGANIZED HEALTH CARE EDUCATION/TRAINING PROGRAM

## 2025-05-16 PROCEDURE — 86140 C-REACTIVE PROTEIN: CPT | Performed by: PHYSICIAN ASSISTANT

## 2025-05-16 PROCEDURE — 84132 ASSAY OF SERUM POTASSIUM: CPT | Performed by: STUDENT IN AN ORGANIZED HEALTH CARE EDUCATION/TRAINING PROGRAM

## 2025-05-16 PROCEDURE — 85652 RBC SED RATE AUTOMATED: CPT | Performed by: PHYSICIAN ASSISTANT

## 2025-05-16 PROCEDURE — 94760 N-INVAS EAR/PLS OXIMETRY 1: CPT

## 2025-05-16 PROCEDURE — 82565 ASSAY OF CREATININE: CPT | Performed by: STUDENT IN AN ORGANIZED HEALTH CARE EDUCATION/TRAINING PROGRAM

## 2025-05-16 PROCEDURE — 85027 COMPLETE CBC AUTOMATED: CPT | Performed by: STUDENT IN AN ORGANIZED HEALTH CARE EDUCATION/TRAINING PROGRAM

## 2025-05-16 PROCEDURE — 80048 BASIC METABOLIC PNL TOTAL CA: CPT | Performed by: STUDENT IN AN ORGANIZED HEALTH CARE EDUCATION/TRAINING PROGRAM

## 2025-05-16 PROCEDURE — 94640 AIRWAY INHALATION TREATMENT: CPT

## 2025-05-16 PROCEDURE — 85007 BL SMEAR W/DIFF WBC COUNT: CPT | Performed by: STUDENT IN AN ORGANIZED HEALTH CARE EDUCATION/TRAINING PROGRAM

## 2025-05-16 RX ORDER — POTASSIUM CHLORIDE 1500 MG/1
40 TABLET, EXTENDED RELEASE ORAL EVERY 4 HOURS
Status: DISPENSED | OUTPATIENT
Start: 2025-05-16 | End: 2025-05-16

## 2025-05-16 RX ORDER — FAMOTIDINE 20 MG/1
20 TABLET, FILM COATED ORAL DAILY
Status: DISCONTINUED | OUTPATIENT
Start: 2025-05-16 | End: 2025-05-27

## 2025-05-16 RX ORDER — BUSPIRONE HYDROCHLORIDE 5 MG/1
5 TABLET ORAL 2 TIMES DAILY
Status: DISCONTINUED | OUTPATIENT
Start: 2025-05-16 | End: 2025-05-27

## 2025-05-16 RX ORDER — ALBUTEROL SULFATE 0.83 MG/ML
2.5 SOLUTION RESPIRATORY (INHALATION) EVERY 6 HOURS PRN
Status: DISCONTINUED | OUTPATIENT
Start: 2025-05-16 | End: 2025-05-27

## 2025-05-16 RX ORDER — ATORVASTATIN CALCIUM 20 MG/1
20 TABLET, FILM COATED ORAL NIGHTLY
Status: DISCONTINUED | OUTPATIENT
Start: 2025-05-16 | End: 2025-05-27

## 2025-05-16 RX ORDER — BUPROPION HYDROCHLORIDE 100 MG/1
200 TABLET, EXTENDED RELEASE ORAL DAILY
Status: DISCONTINUED | OUTPATIENT
Start: 2025-05-16 | End: 2025-05-27

## 2025-05-16 RX ORDER — METOPROLOL TARTRATE 25 MG/1
25 TABLET, FILM COATED ORAL 2 TIMES DAILY
Status: DISCONTINUED | OUTPATIENT
Start: 2025-05-16 | End: 2025-05-27

## 2025-05-16 NOTE — H&P
[facility]  HOSPITALIST  History and Physical     Sujey Medina Patient Status:  Inpatient    3/6/1950 MRN [id number]   Location [facility] 7NE-A Attending Kayleigh Mills MD   Hosp Day # 1 PCP Rajni Tiwari MD     Chief Complaint:   Tx from [facility] for dizziness, subdural empyema    History of Present Illness: Sujey Medina is a 75 year old female with PMH sig for DL, HTN, CKD III, MDD, GERD, bladder ca stage iv w/ mets to brain sp craniotomy and frontal tumor resection presents to ED at [facility] w/ weakness and falls. Pt was recently admitted to [facility] for scheduled left frontal craniotomy for brain met tumor resection on 25. She was discharged home in stable condition but developed sob and increased weakness and dizziness with some falls. Per  pt was unable to have HH come with in a few days of dc and instead it took almost a week for a HH RN to visit pt at home. She presented to the ED  at [facility] on  following a fall and deemed stable for dc home. She then had persistent symptoms and  brought pt to [facility] ED where he reports cough, no fevers, weakness fatigue. MRI brain in [facility] resulted with concern for subdural empyema. NSGY and ID were consulted and pt was admitted to [facility] and started on IV abx. Pt was seen by neurosurgery at [facility] and transferred to W as her neurosurgeon is here.       Past Medical History:Past Medical History[1]     Past Surgical History: Past Surgical History[2]    Social History:  reports that she has never smoked. She has never used smokeless tobacco. She reports that she does not currently use alcohol after a past usage of about 2.0 standard drinks of alcohol per week. She reports that she does not use drugs.    Family History: Family History[3]     Allergies: Allergies[4]    Medications:  Medications Ordered Prior to Encounter[5]    Review of Systems:   A comprehensive 14 point review of systems was completed.    Pertinent positives and negatives noted in  the HPI.    Physical Exam:    BP 96/61 (BP Location: Left arm)   Pulse 93   Temp 98.8 °F (37.1 °C) (Temporal)   Resp 18   Wt 139 lb 15.9 oz (63.5 kg)   LMP  (LMP Unknown)   SpO2 93%   BMI 23.30 kg/m²   General: No acute distress. Alert and oriented x 3.  HEENT: craniotomy scar visible, clean dry intact, staple.  Neck: No lymphadenopathy. No JVD. No carotid bruits.  Respiratory: Clear to auscultation bilaterally. No wheezes. No rhonchi.  Cardiovascular: S1, S2. Regular rate and rhythm. No murmurs, rubs or gallops. Equal pulses.   Chest and Back: No tenderness or deformity.  Abdomen: Soft, nontender, nondistended.  Positive bowel sounds. No rebound, guarding or organomegaly.  Neurologic: No focal neurological deficits. CNII-XII grossly intact.  Musculoskeletal: Moves all extremities.  Extremities: No edema or cyanosis.  Integument: No rashes or lesions.   Psychiatric: Appropriate mood and affect.      Diagnostic Data:      Labs:  Recent Labs   Lab 05/16/25  0546   WBC 6.8   HGB 10.8*   MCV 96.6   .0*   BAND 9       Recent Labs   Lab 05/15/25  1953 05/16/25  0546   GLU  --  144*   BUN  --  18   CREATSERUM 1.14* 1.03*  1.03*   CA  --  8.3*   NA  --  133*   K  --  3.6   CL  --  105   CO2  --  17.0*       Estimated Creatinine Clearance: 41.8 mL/min (A) (based on SCr of 1.03 mg/dL (H)).    No results for input(s): \"PTP\", \"INR\" in the last 168 hours.    COVID-19 Lab Results    COVID-19  Lab Results   Component Value Date    COVID19 Not Detected 10/25/2022    COVID19 Not Detected 10/20/2022    COVID19 Not Detected 10/19/2022       Pro-Calcitonin  No results for input(s): \"PCT\" in the last 168 hours.    Cardiac  No results for input(s): \"TROP\", \"PBNP\" in the last 168 hours.    Creatinine Kinase  No results for input(s): \"CK\" in the last 168 hours.    Inflammatory Markers  No results for input(s): \"CRP\", \"ERICK\", \"LDH\", \"DDIMER\" in the last 168 hours.    No results for input(s): \"TROP\", \"TROPHS\", \"CK\" in the last  168 hours.    Imaging: Imaging data reviewed in Epic.      ASSESSMENT / PLAN:    Sujey Medina is a 75 year old female with PMH sig for DL, HTN, CKD III, MDD, GERD, bladder ca stage iv w/ mets to brain sp craniotomy and frontal tumor resection presents to ED at [facility] w/ weakness, sob,  and falls.     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 Daigle  - Follows with neurosurgery, oncology and radiation oncology outpatient  - On keppra PTA >> continue  - Elevated inflammatory markers and procal, temp to 100.2  - Neurosurgery consulted >> plan to be seen by Dr Daigle  - cont iv abx as ordered  - ID following    Recurrent Falls  Generalized Weakness  Dizziness  - Suspect related to above   - No obvious focal neurological deficits  - CTH with no acute findings  - Check keppra level  - PT/OT as able    HTN  DL  -stable  -resume hoome meds as able    GED  -PPI    MDD  -resume home meds      Quality:  DVT Prophylaxis: lovenox, scds  CODE status: full code  If COVID testing is negative, may discontinue isolation: yes     Plan of care discussed with patient and , all questions answered.        Kayleigh Mills MD  [facility] Hospitalist  Pager [phone]  Answering Service number: [phone]            **Certification      PHYSICIAN Certification of Need for Inpatient Hospitalization - Initial Certification    Patient will require inpatient services that will reasonably be expected to span two midnight's based on the clinical documentation in H+P.   Based on patients  current state of illness, I anticipate that, after discharge, patient will require TBD.                  [1]   Past Medical History:   Cancer (HCC)    CKD (chronic kidney disease), stage III (HCC)    HYPERLIPIDEMIA    Hypertension    OSTEOPENIA    Personal history of bladder cancer    S/P ileoconduit   [2]   Past Surgical History:  Procedure Laterality Date    Benign biopsy right  1988    Colon ca scrn not hi rsk ind N/A 10/28/2015    Procedure: COLONOSCOPY, POSSIBLE BIOPSY, POSSIBLE POLYPECTOMY 62697;  Surgeon: Lai Heard MD;  Location: [facility]    Colonoscopy  6/03    diverticulosis, hemorrhoids    Colonoscopy,diagnostic  10/28/15    diverticulosis    Cystoscopy,insert ureteral stent  3/12/10    Performed by BARNEY IZAGUIRRE at [facility]    Cystoscopy,insert ureteral stent  9/10/2010    Performed by BARNEY IZAGUIRRE at [facility]    Cystoscopy,remv calculus,simple  11/19/2010    Performed by BARNEY IZAGUIRRE at [facility]    Cystourethroscopy  3/2/2011    Performed by APRIL NO at [facility]    Cystourethroscopy,biopsy  11/19/2010    Performed by BARNEY IZAGUIRRE at [facility]    Cystourethroscopy,fulgur 2-5cm lesn  3/12/10    Performed by BARNEY IZAGUIRRE at [facility]    Cystourethroscopy,fulgur 2-5cm lesn  9/10/2010    Performed by BARNEY IZAGUIRRE at [facility]    Eeg phy/qhp ea incr w/veeg  4/18/2025    Fluor gid ctr vad plmt rplcmt/rmvl  3/14/2011    Performed by TANESHA ALEXANDRE at [facility]    Insertion, tunneled centrally inserted venous access device, w/subq port; >5 years  3/14/2011    Performed by TANESHA ALEXANDRE at [facility]    Other surgical history  22 yrs ago    right breast bx    Other surgical history  2-23-09    Dr. Jeremy hutton    Other surgical history  6-3-10    cysto-poss [id number]-Qy. Merrick    Other surgical history  9/10/10    cysto,  URS, RPG, TURBT, stent exchange-Dr. Izaguirre    Other surgical history  11    cysto- Dr. Izaguirre    Other surgical history      thoracic surgery    Other surgical history  13     VATS RUL    Patient documented not to have experienced any of the following events N/A 10/28/2015    Procedure: COLONOSCOPY, POSSIBLE BIOPSY, POSSIBLE POLYPECTOMY 52932;  Surgeon: Lai Heard MD;  Location: [facility]    Patient withough preoperative order for iv antibiotic surgical site infection prophylaxis. N/A 10/28/2015    Procedure: COLONOSCOPY, POSSIBLE BIOPSY, POSSIBLE POLYPECTOMY 03629;  Surgeon: Lai Heard MD;  Location: [facility]    Removal of tunneled cva device, with subq port or pump, central or peripheral insertion  2013    Procedure: PORT-A-CATH REMOVAL;  Surgeon: Bobby Palmer MD;  Location: [facility]    Skin surgery  2019    MOHS, FELIPE - nasal dorsum, Dr. PATRICIA Ghosh    Tubal ligation      X-ray retrograde pyelogram  3/12/10    Performed by BARNEY IZAGUIRRE at [facility]    X-ray retrograde pyelogram  9/10/2010    Performed by BARNEY IZAGUIRRE at [facility]   [3]   Family History  Problem Relation Age of Onset    Other (Other) Father         suicide    Heart Disorder Mother     Heart Disorder Maternal Grandmother     Other (Other) Sister         mentally disabled    Cancer Neg    [4] No Known Allergies  [5]   Current Facility-Administered Medications on File Prior to Encounter   Medication Dose Route Frequency Provider Last Rate Last Admin    [COMPLETED] gadoterate meglumine (Dotarem) 10 MMOL/20ML injection 20 mL  20 mL Intravenous ONCE PRN Tessa Goodman MD   20 mL at 25 1315    [] LORazepam (Ativan) 2 mg/mL injection             [COMPLETED] levETIRAcetam (Keppra) 2,750 mg in sodium chloride 0.9% 100 mL IVPB  40 mg/kg Intravenous Once Eva Perry APRN 400 mL/hr at 25 0131 2,750 mg at 25  0131    [COMPLETED] dexamethasone (Decadron) 4 MG/ML injection 4 mg  4 mg Intravenous Q6H Palma Roper PA   4 mg at 25 210    [COMPLETED] dexamethasone (Decadron) tab 4 mg  4 mg Oral 4 times per day Palma Roper PA   4 mg at 25 0016    [COMPLETED] ceFAZolin (Ancef) 2g in 10mL IV syringe premix  2 g Intravenous Q8H Palma Roper  mL/hr at 25 0857 2 g at 25 0857    [] sodium zirconium cyclosilicate (Lokelma) oral packet 10 g  10 g Oral Q8H Ali Irfan, DO   10 g at 25 1939    [COMPLETED] iopamidol 76% (ISOVUE-370) injection for power injector  100 mL Intravenous ONCE PRN Sky Gruber, DO   80 mL at 25 1216    [COMPLETED] dexAMETHasone PF (Decadron) 10 MG/ML injection 4 mg  4 mg Intravenous Once Norman López MD   4 mg at 04/10/25 2131    [] ondansetron (Zofran) 4 MG/2ML injection 4 mg  4 mg Intravenous Q4H PRN Norman López MD        [COMPLETED] gadoterate meglumine (Dotarem) 10 MMOL/20ML injection 20 mL  20 mL Intravenous ONCE PRN Norman López MD   20 mL at 04/10/25 2246    [COMPLETED] potassium chloride 20 mEq/100mL IVPB premix 20 mEq  20 mEq Intravenous Once Pepito Casas, DO 50 mL/hr at 25 0012 20 mEq at 25 0012     Current Outpatient Medications on File Prior to Encounter   Medication Sig Dispense Refill    levETIRAcetam 1000 MG Oral Tab Take 1 tablet (1,000 mg total) by mouth 2 (two) times daily. 60 tablet 0    [] dexamethasone 2 MG Oral Tab Take 2 tablets (4 mg total) by mouth every 8 (eight) hours for 4 days, THEN 2 tablets (4 mg total) 2 (two) times a day for 4 days, THEN 1 tablet (2 mg total) 2 (two) times a day for 4 days. 48 tablet 0    levETIRAcetam 1000 MG Oral Tab Take 1 tablet (1,000 mg total) by mouth 2 (two) times daily. Continue this med until you follow up in clnici 60 tablet 0    buPROPion HCl ER, SR, 200 MG Oral Tablet 12 Hr Take 1 tablet (200 mg total) by mouth  in the morning.      busPIRone 5 MG Oral Tab Take 1 tablet (5 mg total) by mouth in the morning and 1 tablet (5 mg total) before bedtime.      metoprolol tartrate 25 MG Oral Tab Take 1 tablet (25 mg total) by mouth 2 (two) times daily.      acidophilus-pectin Oral Cap Take 1 capsule by mouth in the morning.      simvastatin 40 MG Oral Tab TAKE ONE TABLET (40 MG TOTAL) BY MOUTH NIGHTLY 90 tablet 3    Multiple Vitamin (MULTI-VITAMIN DAILY OR) Take 1 tablet by mouth in the morning.      [] famotidine 20 MG Oral Tab Take 1 tablet (20 mg total) by mouth daily for 15 days. 15 tablet 0

## 2025-05-16 NOTE — PLAN OF CARE
Assumed care at 0700. Pt A/O x4, forgetful. RA during the day.  2L O2 at night. Neurosurgery consulted.   IV abx switched and given per MAR.   Plan for pt to work with PT/OT lor.  Pt family at the bedside. Updated on POC.  Call light within reach.

## 2025-05-16 NOTE — CONSULTS
[facility] Neurosurgery Consult    Sujey Medina Patient Status:  Inpatient    3/6/1950 MRN [id number]   Location [facility] 7NE-A Attending Kayleigh Mills MD   Hosp Day # 1 PCP Rajni Tiwari MD     REASON FOR CONSULTATION:  Concern for L frontal empyema    HISTORY OF PRESENT ILLNESS:  Sujey Medina is a(n) 75 year old female bladder cancer with left frontal lobe mass s/p left frontal craniotomy for tumor rsxn 25 presenting with a productive cough and generalized weakness. She initially went to the ED and found on xray to have fluid in her lungs and discharged home. Her symptoms persistent and she presented to [facility] ED on  and found to have pneumonia for which she was started on Vanc, Cefepime and Flagyl. An MRI was obtained revealing post-operative left craniotomy changes with encephalomalacia and overlying small sbudral collection with restricted diffusion enhancement for which there was concern for possible subdural empyema and patient was transferred to [facility] for further evaluation. Her WBC is 6.8, tmax 99.4 last evening otherwise afebrile today.    Patient reports over the last 2 days she has had a productive cough and has been generally weak. She has felt dizzy. She felt improved yesterday but feels worse this morning.     PAST MEDICAL HISTORY:  Past Medical History[1]    PAST SURGICAL HISTORY:  Past Surgical History[2]    FAMILY HISTORY:  family history includes Heart Disorder in her maternal grandmother and mother; Other in her father and sister.    SOCIAL HISTORY:   reports that she has never smoked. She has never used smokeless tobacco. She reports that she does not currently use alcohol after a past usage of about 2.0 standard drinks of alcohol per week. She reports that she does not use drugs.    ALLERGIES:  Allergies[3]    MEDICATIONS:  Prescriptions Prior to Admission[4]  Current Hospital Medications[5]    REVIEW OF SYSTEMS:  Comprehensive Review of Systems obtained, and  is negative other than that mentioned in the History of Present Illness.      PHYSICAL EXAMINATION:  VITAL SIGNS: BP 97/58 (BP Location: Left arm)   Pulse 87   Temp 97.7 °F (36.5 °C) (Oral)   Resp 24   Wt 139 lb 15.9 oz (63.5 kg)   LMP  (LMP Unknown)   SpO2 95%   BMI 23.30 kg/m²   GENERAL:  Patient is a 75 year old female in no acute distress.  HEENT:  Normocephalic, atraumatic    NEUROLOGICAL:  This patient is alert and orientated x 3. Wearing O2, short of breath throughout examination. Speech fluent. Comprehension intact.   PERRLA +3 brisk,  EOMI.  VFF.  Face is symmetrical. Tongue is midline.  Uvula and palate elevate symmetrically. Shoulder shrug normal bilaterally. Remaining CN's GI.  Sensation to light touch is intact bilaterally.  Motor: No Drift. No arm or leg weakness noted. 5/5 bilaterally. Finger-to-nose coordination is intact.  Gait deferred.       Upper extremity strength:       Deltoid    Triceps     Biceps        Wrist Extension  Finger extension Thumb Abduction  Thumb adduction Intrinsics   Right         5        5         5          5 5 5 5 5 5   Left         5        5         5          5 5 5 5 5 5     Lower extremity strength:     Iliopsoas  Hamstrings   Quads    D-flexion P-flexion Eversion   Right       5         5       5         5 5 5   Left       5         5       5         5 5 5     Reflexes DTRs:     Biceps   Brachioradialis   Triceps    Patellar    Ankle  Hamstring   Right      2+           2+       2+        2+       2+        2+   Left      2+           2+       2+        2+       2+        2+        DIAGNOSTIC DATA:   Lab Results   Component Value Date    WBC 6.8 05/16/2025    HGB 10.8 05/16/2025    HCT 31.0 05/16/2025    .0 05/16/2025    CREATSERUM 1.03 05/16/2025    CREATSERUM 1.03 05/16/2025    BUN 18 05/16/2025     05/16/2025    K 3.6 05/16/2025     05/16/2025    CO2 17.0 05/16/2025     05/16/2025    CA 8.3 05/16/2025       IMAGING:  XR CHEST AP  PORTABLE  (CPT=71045)  Result Date: 5/13/2025  CONCLUSION:  See above.  A preliminary report was provided by [facility].    LOCATION:  [facility]      Dictated by (CST): Partha Moe MD on 5/13/2025 at 8:06 AM     Finalized by (CST): Partha Moe MD on 5/13/2025 at 8:08 AM       CT BRAIN OR HEAD (CPT=70450)  Result Date: 5/12/2025  PROCEDURE:  CT BRAIN OR HEAD (60085)  COMPARISON:  [facility] , CT, CT BRAIN OR HEAD (70789), 4/17/2025, 0:54 AM.  INDICATIONS:  Discussed with Dr. Sky, no neck pain, no LOC, no blood thinners  TECHNIQUE:  Noncontrast CT scanning is performed through the brain. Dose reduction techniques were used. Dose information is transmitted to the ACR (American College of Radiology) NRDR (National Radiology Data Registry) which includes the Dose Index Registry.  PATIENT STATED HISTORY: (As transcribed by Technologist)  Patient presents for evaluation of mechanical fall that occured today, hit head. History of brain surgery x2 weeks ago.    FINDINGS: Sequelae of left frontal craniotomy is noted.  Vasogenic in left frontal lobe is noted.  Volume loss the left frontal lobe is noted.  Previously noted region of pneumocephalus with volume loss now measures 2.2 x 1.5 cm.  Extra-axial intermediate density material adjacent to the craniotomy site measures 6 mm.  Lucencies in the deep periventricular white matter are likely sequelae of chronic small vessel ischemic disease. Mild prominence of the sulci. Visualized portions of paranasal sinuses are unremarkable. Visualized portions of the mastoid air cells are unremarkable. Visualized portions of the orbits are unremarkable. IMPRESSION: Left frontal craniotomy is noted.  Vasogenic edema and regions of volume loss are again identified.  Extra-axial material adjacent to the left frontal craniotomy is likely postoperative in nature.  Decreased pneumocephalus.    LOCATION:  [facility]   Dictated by (CST): Magdi Martinez MD on 5/12/2025 at 7:20 PM     Finalized by  (CST): Magdi Martinez MD on 5/12/2025 at 7:24 PM          ASSESSMENT:  Problem List[6]  Sujey Medina is a(n) 75 year old female bladder cancer with left frontal lobe mass s/p left frontal craniotomy for tumor rsxn 4/16/25 presenting with a productive cough and generalized weakness found on MRI at OSH to have expected post-operative changes with an enhancing area of restrictive diffusion for which radiologist was concerned for subdural empyema. Patient currently non-toxic appearing, afebrile and without leukocytosis, low suspicion for empyema. She is neurologically intact    Plan:  -No acute neurosurgical intervention indicated at this time  -Obtain MRI brain wwo from [facility] to be uploaded into PACS otherwise may need to consider repeating MRI here  -Trend for fever  -Check ESR and CRP (ordered)  -Appreciate ID recs for pneumonia treatment  -Will continue to follow    Patient seen with Dr. Oxana Sutton PA-C  [facility]  5/16/2025 1:35 PM      Is this a shared or split note between Advanced Practice Provider and Physician? Yes     Patient seen and examined with PA  Case discussed  She looks great  She is wide-awake and alert oriented x 3  Follows commands x 4 no drift  She does not look sick from an empyema at all  CBC was normal  Report of MRI showed what sounds like the postoperative changes  Unfortunately MRI was not brought with her  Will discuss with ID  Recommend treating her pneumonia  She is coughing and short of breath  Will follow         [1]   Past Medical History:   Cancer (HCC)    CKD (chronic kidney disease), stage III (HCC)    HYPERLIPIDEMIA    Hypertension    OSTEOPENIA    Personal history of bladder cancer    S/P ileoconduit   [2]   Past Surgical History:  Procedure Laterality Date    Benign biopsy right  1988    Colon ca scrn not hi rsk ind N/A 10/28/2015    Procedure: COLONOSCOPY, POSSIBLE BIOPSY, POSSIBLE POLYPECTOMY 08856;  Surgeon: Lai Heard MD;   Location: [facility]    Colonoscopy  6/03    diverticulosis, hemorrhoids    Colonoscopy,diagnostic  10/28/15    diverticulosis    Cystoscopy,insert ureteral stent  3/12/10    Performed by BARNEY IZAGUIRRE at [facility]    Cystoscopy,insert ureteral stent  9/10/2010    Performed by BARNEY IZAGUIRRE at [facility]    Cystoscopy,remv calculus,simple  11/19/2010    Performed by BARNEY IZAGUIRRE at [facility]    Cystourethroscopy  3/2/2011    Performed by APRIL NO at [facility]    Cystourethroscopy,biopsy  11/19/2010    Performed by BARNEY IZAGUIRRE at [facility]    Cystourethroscopy,fulgur 2-5cm lesn  3/12/10    Performed by BARNEY IZAGUIRRE at [facility]    Cystourethroscopy,fulgur 2-5cm lesn  9/10/2010    Performed by BARNEY IZAGUIRRE at [facility]    Eeg phy/qhp ea incr w/veeg  4/18/2025    Fluor gid ctr vad plmt rplcmt/rmvl  3/14/2011    Performed by TANESHA ALEXANDRE at [facility]    Insertion, tunneled centrally inserted venous access device, w/subq port; >5 years  3/14/2011    Performed by TANESHA ALEXANDRE at [facility]    Other surgical history  22 yrs ago    right breast bx    Other surgical history  2-23-09    brennen, Dr. Izaguirre    Other surgical history  6-3-10    cysto-poss [id number]-Nz. Merrick    Other surgical history  9/10/10    cysto, URS, RPG, TURBT, stent exchange-Dr. Izaguirre    Other surgical history  2/8/11    cysto- Dr. Izaguirre    Other surgical history      thoracic surgery    Other surgical history  11/20/13     VATS RUL    Patient documented not to have experienced any of the following events N/A 10/28/2015    Procedure: COLONOSCOPY, POSSIBLE BIOPSY, POSSIBLE POLYPECTOMY 60402;  Surgeon: Lai Heard MD;  Location: [facility]    Patient withough preoperative order for iv antibiotic surgical site infection prophylaxis. N/A 10/28/2015     Procedure: COLONOSCOPY, POSSIBLE BIOPSY, POSSIBLE POLYPECTOMY 65752;  Surgeon: Lai Heard MD;  Location: [facility]    Removal of tunneled cva device, with subq port or pump, central or peripheral insertion  2013    Procedure: PORT-A-CATH REMOVAL;  Surgeon: Bobby Palmer MD;  Location: [facility]    Skin surgery  2019    MOHS, BCC - nasal dorsum, Dr. PATRICIA Ghosh    Tubal ligation      X-ray retrograde pyelogram  3/12/10    Performed by BARNEY IZAGUIRRE at [facility]    X-ray retrograde pyelogram  9/10/2010    Performed by BARNEY IZAGUIRRE at [facility]   [3] No Known Allergies  [4]   Medications Prior to Admission   Medication Sig Dispense Refill Last Dose/Taking    levETIRAcetam 1000 MG Oral Tab Take 1 tablet (1,000 mg total) by mouth 2 (two) times daily. 60 tablet 0 Taking    [] dexamethasone 2 MG Oral Tab Take 2 tablets (4 mg total) by mouth every 8 (eight) hours for 4 days, THEN 2 tablets (4 mg total) 2 (two) times a day for 4 days, THEN 1 tablet (2 mg total) 2 (two) times a day for 4 days. 48 tablet 0 5/15/2025 at  9:00 AM    levETIRAcetam 1000 MG Oral Tab Take 1 tablet (1,000 mg total) by mouth 2 (two) times daily. Continue this med until you follow up in [facility] 60 tablet 0 5/15/2025 at  9:00 AM    buPROPion HCl ER, SR, 200 MG Oral Tablet 12 Hr Take 1 tablet (200 mg total) by mouth in the morning.   5/15/2025 at  9:00 AM    busPIRone 5 MG Oral Tab Take 1 tablet (5 mg total) by mouth in the morning and 1 tablet (5 mg total) before bedtime.   5/15/2025 at  9:00 AM    metoprolol tartrate 25 MG Oral Tab Take 1 tablet (25 mg total) by mouth 2 (two) times daily.   5/15/2025 at  9:00 AM    acidophilus-pectin Oral Cap Take 1 capsule by mouth in the morning.   5/15/2025 at  9:00 AM    simvastatin 40 MG Oral Tab TAKE ONE TABLET (40 MG TOTAL) BY MOUTH NIGHTLY 90 tablet 3 2025 at  9:00 PM    Multiple Vitamin (MULTI-VITAMIN DAILY OR)  Take 1 tablet by mouth in the morning.   5/15/2025 at  9:00 AM    [] famotidine 20 MG Oral Tab Take 1 tablet (20 mg total) by mouth daily for 15 days. 15 tablet 0    [5]   Current Facility-Administered Medications   Medication Dose Route Frequency    potassium chloride (Klor-Con M20) tab 40 mEq  40 mEq Oral Q4H    heparin (Porcine) 5000 UNIT/ML injection 5,000 Units  5,000 Units Subcutaneous 2 times per day    acetaminophen (Tylenol Extra Strength) tab 500 mg  500 mg Oral Q4H PRN    dexamethasone (Decadron) tab 4 mg  4 mg Oral BID    Followed by    [START ON 2025] dexamethasone (Decadron) tab 2 mg  2 mg Oral BID    levETIRAcetam (Keppra) tab 1,000 mg  1,000 mg Oral BID    vancomycin (Vancocin) 1,000 mg in sodium chloride 0.9% 250 mL IVPB-ADDV  15 mg/kg Intravenous Q24H    metroNIDAZOLE in sodium chloride 0.79% (Flagyl) 5 mg/mL IVPB premix 500 mg  500 mg Intravenous q6h    ceFEPIme (Maxipime) 2 g in sodium chloride 0.9% 100mL IVPB-FAMILIA  2 g Intravenous q12h   [6]   Patient Active Problem List  Diagnosis    Mixed hyperlipidemia    Multiple lung nodules    Bladder cancer metastasized to lung (HCC)    Stage 3 chronic kidney disease, unspecified whether stage 3a or 3b CKD (HCC)    Macrocytosis without anemia    Hyperparathyroidism, primary (HCC)    Osteopenia    History of squamous cell carcinoma in situ    Purpura    History of high blood pressure    Syncope, near    Urinary tract infection without hematuria, site unspecified    Cognitive disorder    Major depressive disorder, single episode, severe (HCC)    Generalized anxiety disorder    Brain mass    Seizure (HCC)    Subdural empyema (HCC)

## 2025-05-16 NOTE — PLAN OF CARE
NURSING ADMISSION NOTE      Patient admitted via Ambulance to 7600  Oriented to room.  Safety precautions initiated.  Bed in low position.  Call light in reach.  Admission navigator completed by charge RN along with   Received pt at 1930  Pt AOx2, ST/SR, RA/ 2L NC NOC, VSS  R head incision laith  Urostomy bag changed  IV abx  D/c Planning: Specialties to see  Call light within reach.  Needs currently met

## 2025-05-16 NOTE — CONSULTS
Infectious Disease Initial Consultation      Date of admission: 5/15/2025  7:11 PM     Date of service: 05/16/25 7:25 AM    Consult requested by: Pepito Caass DO    Reason for consult: Subdural empyema    Chief complaint: Cough    History of present illness: Sujey Medina is a 75 year old female with history of urothelial cancer with brain metastases, who recently had craniotomy on 4/17/2025.  The patient reportedly developed a cough with thick green sputum without any hemoptysis associate with generalized weakness.  She initially went to the emergency room where she was told that she had some fluid in her lungs and was discharged home.  Unfortunately, her symptoms continue to progress so presented to [facility] ER on 5/14/2025.  At that point, she was noted to have a pneumonia on chest imaging and was subsequently started on vancomycin, cefepime and metronidazole.  Her MRSA screen during that admission was negative.  RVP was negative.  Urine strep pneumo and Legionella antigens were negative.  CAT scan of the chest showed mild patchy airspace opacities in both lungs more prominent on the right on the left without any pleural effusions.  An MRI of the brain was also ordered during that admission that showed left frontal craniotomy changes with encephalomalacia and overlying small subdural collection with associated restricted diffusion enhancement concerning for subdural empyema.  The patient was transferred to [facility] to [facility] for evaluation by her neurosurgeon as such.  She was continued on the above antibiotics with CNS dosing.    Upon presentation here, the patient was afebrile, hemodynamically stable.  Labs revealed mild anemia and thrombocytopenia but otherwise unremarkable CBC.  BMP showed creatinine 1.13.  Patient was maintained on cefepime, Flagyl and vancomycin.  Neurosurgical consultation is currently pending.    Review of systems:  All other components of the  review of systems are negative, except those described in the history of present illness.     Past Medical History[1]  Past Surgical History[2]  Short Social Hx on File[3]  Family History[4]  Reviewed, see above    Medications:  Current Hospital Medications[5]     Allergies:  Allergies[6]    Physical Exam:  Vitals:    05/16/25 0445   BP: 96/61   Pulse: 93   Resp: 18   Temp: 98.8 °F (37.1 °C)     Vitals signs and nursing note reviewed.   Constitutional:       Appearance: Normal appearance.   HENT:      Head: Surgical incision noted without any evidence of cellulitis or drainage     Mouth: Mucous membranes are moist.   Neck:      Musculoskeletal: Neck supple.   Cardiovascular:      Rate and Rhythm: Normal rate.      Heart sounds: Normal heart sounds. No murmur. No friction rub. No gallop.    Pulmonary:      Effort: Pulmonary effort is normal. No respiratory distress.      Breath sounds: Normal breath sounds. No stridor. No wheezing, rhonchi or rales.   Chest:      Chest wall: No tenderness.   Abdominal:      General: Abdomen is flat. There is no distension.  Urostomy noted with clear urine     Palpations: Abdomen is soft. There is no mass.      Tenderness: There is no tenderness. There is no guarding or rebound.      Hernia: No hernia is present.   Musculoskeletal:      Right lower leg: No edema.      Left lower leg: No edema.   Skin:     General: Skin is warm and dry.   Neurological:      General: No focal deficit present.      Mental Status: Alert and oriented to person, place, and time.     Laboratory data:  I have independently reviewed all lab results; including old microbiological results.  Lab Results   Component Value Date    WBC 6.8 05/16/2025    HGB 10.8 05/16/2025    HCT 31.0 05/16/2025    .0 05/16/2025    CREATSERUM 1.03 05/16/2025    CREATSERUM 1.03 05/16/2025    BUN 18 05/16/2025     05/16/2025    K 3.6 05/16/2025     05/16/2025    CO2 17.0 05/16/2025     05/16/2025    CA 8.3  05/16/2025        Recent Labs   Lab 05/16/25  0546   RBC 3.21*   HGB 10.8*   HCT 31.0*   MCV 96.6   MCH 33.6   MCHC 34.8   RDW 15.3   NEPRELIM 5.94   WBC 6.8   .0*   NEUT 82   LYMPH 8   MON 0   EOS 1       Microbiology data:  No results found for this visit on 05/15/25.      Radiology:  I have reviewed all imaging data available independently.   MRI of the brain on 5/15/2025:  1.  Left frontal craniotomy changes with encephalomalacia.  Surgical resection cavity in the anterior left frontal lobe with hemosiderin staining/chronic blood products.  Overlying small subdural collection with associated restricted diffusion and enhancement concerning for subdural empyema.  Clinical correlation is advised.  There is adjacent dural thickening and enhancement.  Consider neurosurgical consultation.     2.  Minimal enhancement along the posterior margin of the left frontal lobe surgical resection cavity, nonspecific finding.  No other areas of pathologic enhancement are noted.     3.  No acute infarct, hydrocephalus, or midline shift.  Chronic microvascular ischemic changes bilaterally.     CT chest on 5/14/2025:  1.  Negative exam for central and proximal segmental pulmonary emboli.     2.  Mild bibasilar patchy airspace opacities likely represent infectious/inflammatory process in the appropriate clinical setting. Follow-up to resolution is recommended.     Impression:  Sujey Medina is a 75 year old female with     Bilateral pneumonia  MRSA screen was negative  Urine strep pneumo and Legionella antigens were negative  RVP at [facility] was also negative  Currently on IV cefepime, vancomycin along with metronidazole, see below  Question of subdural empyema  This is in the setting of a craniotomy done on 4/17/2025 for metastatic cancer  However, the patient is not having any headaches fevers or mental status changes arguing against this being an infected collection  Neurosurgical evaluation is currently  pending  Currently on IV cefepime, vancomycin and metronidazole    Recommendations:     Follow-up neurosurgical evaluation  Continue IV cefepime, vancomycin and metronidazole for now, CNS dosing  Once evaluated by neurosurgery, if there is no concern for infection, vancomycin and Flagyl both can be discontinued and cefepime can be reduced to standard none CNS dosing  Continue to monitor daily labs for antibiotic toxicity  Further recommendations will depend on the above work-up and clinical progress     The plan of care was discussed with the primary hospital team, Pepito Casas DO     Recommendations were also discussed with the patient; all questions were answered.     Thank you for this consultation. Please don't hesitate to call the ID team for questions or any acute changes in patient's clinical condition.    Please note that this report has been produced using speech recognition software and may contain errors related to that system including, but not limited to, errors in grammar, punctuation, and spelling, as well as words and phrases that possibly may have been recognized inappropriately.  If there are any questions or concerns, contact the dictating provider for clarification.    The  Century Cures Act makes medical notes like these available to patients in the interest of transparency. Please be advised this is a medical document. Medical documents are intended to carry relevant information, facts as evident, and the clinical opinion of the practitioner. The medical note is intended as peer to peer communication and may appear blunt or direct. It is written in medical language and may contain abbreviations or verbiage that are unfamiliar.     Tommy Carlton MD  [facility]. Tel: [phone]. Fax: [phone].     Sujey ERAZO Adam : 3/6/1950 MRN: [id number] [facility]: [id number]          [1]   Past Medical History:   Cancer (HCC)    CKD (chronic kidney disease), stage III (HCC)     HYPERLIPIDEMIA    Hypertension    OSTEOPENIA    Personal history of bladder cancer    S/P ileoconduit   [2]   Past Surgical History:  Procedure Laterality Date    Benign biopsy right  1988    Colon ca scrn not hi rsk ind N/A 10/28/2015    Procedure: COLONOSCOPY, POSSIBLE BIOPSY, POSSIBLE POLYPECTOMY 48646;  Surgeon: Lai Heard MD;  Location: [facility]    Colonoscopy  6/03    diverticulosis, hemorrhoids    Colonoscopy,diagnostic  10/28/15    diverticulosis    Cystoscopy,insert ureteral stent  3/12/10    Performed by BARNEY IZAGUIRRE at [facility]    Cystoscopy,insert ureteral stent  9/10/2010    Performed by BARNEY IZAGUIRRE at [facility]    Cystoscopy,remv calculus,simple  11/19/2010    Performed by BARNEY IZAGUIRRE at [facility]    Cystourethroscopy  3/2/2011    Performed by APRIL NO at [facility]    Cystourethroscopy,biopsy  11/19/2010    Performed by BARNEY IZAGUIRRE at [facility]    Cystourethroscopy,fulgur 2-5cm lesn  3/12/10    Performed by BARNEY IZAGUIRRE at [facility]    Cystourethroscopy,fulgur 2-5cm lesn  9/10/2010    Performed by BARNEY IZAGUIRRE at [facility]    Eeg phy/qhp ea incr w/veeg  4/18/2025    Fluor gid ctr vad plmt rplcmt/rmvl  3/14/2011    Performed by TANESHA ALEXANDRE at [facility]    Insertion, tunneled centrally inserted venous access device, w/subq port; >5 years  3/14/2011    Performed by TANESHA ALEXANDRE at [facility]    Other surgical history  22 yrs ago    right breast bx    Other surgical history  2-23-09    Dr. Jeremy hutton    Other surgical history  6-3-10    cysto-poss [id number]-LWO-Yj. Merrick    Other surgical history  9/10/10    cysto, URS, RPG, TURBT, stent exchange-Dr. Izaguirre    Other surgical history  2/8/11    cysto- Dr. Izaguirre    Other surgical history      thoracic surgery    Other surgical history  11/20/13     VATS RUL     Patient documented not to have experienced any of the following events N/A 10/28/2015    Procedure: COLONOSCOPY, POSSIBLE BIOPSY, POSSIBLE POLYPECTOMY 24800;  Surgeon: Lai Heard MD;  Location: [facility]    Patient withough preoperative order for iv antibiotic surgical site infection prophylaxis. N/A 10/28/2015    Procedure: COLONOSCOPY, POSSIBLE BIOPSY, POSSIBLE POLYPECTOMY 90294;  Surgeon: Lai Heard MD;  Location: [facility]    Removal of tunneled cva device, with subq port or pump, central or peripheral insertion  12/17/2013    Procedure: PORT-A-CATH REMOVAL;  Surgeon: Bobby Palmer MD;  Location: [facility]    Skin surgery  06/13/2019    MOHS, BCC - nasal dorsum, Dr. PATRICIA Ghosh    Tubal ligation      X-ray retrograde pyelogram  3/12/10    Performed by BARNEY IZAGUIRRE at [facility]    X-ray retrograde pyelogram  9/10/2010    Performed by BARNEY IZAGUIRRE at [facility]   [3]   Social History  Socioeconomic History    Marital status:     Number of children: 0   Occupational History    Occupation: Semi retired    Tobacco Use    Smoking status: Never    Smokeless tobacco: Never   Vaping Use    Vaping status: Never Used   Substance and Sexual Activity    Alcohol use: Not Currently     Alcohol/week: 2.0 standard drinks of alcohol     Types: 2 Standard drinks or equivalent per week    Drug use: No   Other Topics Concern    Seat Belt Yes   Social History Narrative    Semi retired . . G0     Social Drivers of Health     Food Insecurity: No Food Insecurity (5/15/2025)    NCSS - Food Insecurity     Worried About Running Out of Food in the Last Year: No     Ran Out of Food in the Last Year: No   Transportation Needs: No Transportation Needs (5/15/2025)    NCSS - Transportation     Lack of Transportation: No   Housing Stability: Not At Risk (5/15/2025)    NCSS - Housing/Utilities     Has Housing: Yes      Worried About Losing Housing: No     Unable to Get Utilities: No   [4]   Family History  Problem Relation Age of Onset    Other (Other) Father         suicide    Heart Disorder Mother     Heart Disorder Maternal Grandmother     Other (Other) Sister         mentally disabled    Cancer Neg    [5]   potassium chloride    heparin    acetaminophen    dexamethasone **FOLLOWED BY** [START ON 5/19/2025] dexamethasone    levetiracetam    vancomycin    metroNIDAZOLE    cefepime  [6] No Known Allergies

## 2025-05-16 NOTE — PROGRESS NOTES
[facility] Pharmacy Dosing Service      Initial Pharmacokinetic Consult for Vancomycin Dosing     Sujey Medina is a 75 year old female who is being initiated on vancomycin therapy for CNS infection.  Pharmacy has been asked to dose vancomycin by Pepito Casas DO .  The initial treatment and monitoring approach will be steady state AUC strategy.        Weight and Temperature:    Wt Readings from Last 1 Encounters:   05/15/25 63.5 kg (139 lb 15.9 oz)        Temp Readings from Last 1 Encounters:   25 97.7 °F (36.5 °C) (Temporal)      Labs:   Recent Labs   Lab 05/15/25  1953   CREATSERUM 1.14*      Estimated Creatinine Clearance: 37.8 mL/min (A) (based on SCr of 1.14 mg/dL (H)).     No results for input(s): \"WBC\" in the last 168 hours.       The Pharmacokinetic Target is:     to 600 mg-h/L and trough <=15 mg/L    Renal Dosing Considerations:    None     Assessment/Plan:   Initial/Loading dose: Has received 1250 mg IV (20 mg/kg, capped at 2250 mg) x 1 initial dose.at [facility] on 5/15 at noon, prior to admission here.       Maintenance dose: Pharmacy will dose vancomycin at 1000 mg IV every 24 hours    Monitorin) Plan for vancomycin peak and trough to be obtained at steady state    2) Pharmacy will order SCr as clinically indicated to assess renal function.    3) Pharmacy will monitor for toxicity and efficacy, adjust vancomycin dose and/or frequency, and order vancomycin levels as appropriate per the Pharmacy and Therapeutics Committee approved protocol until discontinuation of the medication.       We appreciate the opportunity to assist in the care of this patient.     Margarita Root, Kasandra  5/15/2025  9:02 PM  [facility]  Pharmacy Extension: [phone]

## 2025-05-17 ENCOUNTER — APPOINTMENT (OUTPATIENT)
Dept: GENERAL RADIOLOGY | Facility: HOSPITAL | Age: 75
End: 2025-05-17
Attending: INTERNAL MEDICINE
Payer: MEDICARE

## 2025-05-17 LAB
CREAT BLD-MCNC: 1.01 MG/DL (ref 0.55–1.02)
EGFRCR SERPLBLD CKD-EPI 2021: 58 ML/MIN/1.73M2 (ref 60–?)

## 2025-05-17 PROCEDURE — 71045 X-RAY EXAM CHEST 1 VIEW: CPT | Performed by: INTERNAL MEDICINE

## 2025-05-17 PROCEDURE — 94640 AIRWAY INHALATION TREATMENT: CPT

## 2025-05-17 PROCEDURE — 94760 N-INVAS EAR/PLS OXIMETRY 1: CPT

## 2025-05-17 PROCEDURE — 82565 ASSAY OF CREATININE: CPT | Performed by: STUDENT IN AN ORGANIZED HEALTH CARE EDUCATION/TRAINING PROGRAM

## 2025-05-17 NOTE — PROGRESS NOTES
[facility]   part of [facility] Hospitalist Progress Note     Sujey Medina Patient Status:  Inpatient    3/6/1950 MRN [id number]   Location [facility] 7NE-A Attending Kayleigh Mills MD   Hosp Day # 2 PCP Rajni Tiwari MD     Subjective:     Patient seen and examined.   A bit groggy but oriented x 3  Says she is coughing   Doesn't feel great  But overall feels better  afebrile    Objective:    Review of Systems:   10 point ROS completed and was negative, except for pertinent positive and negatives stated in subjective.    Vital signs:  Temp:  [97.6 °F (36.4 °C)-98.5 °F (36.9 °C)] 98.4 °F (36.9 °C)  Pulse:  [76-99] 76  Resp:  [20-24] 24  BP: ()/(58-69) 112/69  SpO2:  [94 %-96 %] 96 %  HEENT: craniotomy scar visible, clean dry intact, staple.  Neck: No lymphadenopathy. No JVD. No carotid bruits.  Respiratory: Clear to auscultation bilaterally. No wheezes. No rhonchi.  Cardiovascular: S1, S2. Regular rate and rhythm. No murmurs, rubs or gallops. Equal pulses.   Chest and Back: No tenderness or deformity.  Abdomen: Soft, nontender, nondistended.  Positive bowel sounds. No rebound, guarding or organomegaly.  Neurologic: No focal neurological deficits. CNII-XII grossly intact.  Musculoskeletal: Moves all extremities.  Extremities: No edema or cyanosis.  Integument: No rashes or lesions.   Psychiatric: Appropriate mood and affect.      Diagnostic Data:    Labs:  Recent Labs   Lab 25  0546   WBC 6.8   HGB 10.8*   MCV 96.6   .0*   BAND 9       Recent Labs   Lab 05/15/25  1953 25  0546 25  1437 25  0556   GLU  --  144*  --   --    BUN  --  18  --   --    CREATSERUM 1.14* 1.03*  1.03*  --  1.01   CA  --  8.3*  --   --    NA  --  133*  --   --    K  --  3.6 3.9  --    CL  --  105  --   --    CO2  --  17.0*  --   --        Estimated Creatinine Clearance: 42.6 mL/min (based on SCr of 1.01 mg/dL).    No results for input(s): \"PTP\", \"INR\" in the last  168 hours.         COVID-19 Lab Results    COVID-19  Lab Results   Component Value Date    COVID19 Not Detected 10/25/2022    COVID19 Not Detected 10/20/2022    COVID19 Not Detected 10/19/2022       Pro-Calcitonin  No results for input(s): \"PCT\" in the last 168 hours.    Cardiac  No results for input(s): \"TROP\", \"PBNP\" in the last 168 hours.    Creatinine Kinase  No results for input(s): \"CK\" in the last 168 hours.    Inflammatory Markers  Recent Labs   Lab 05/16/25  0546   CRP 30.20*       Imaging: Imaging data reviewed in Epic.    Medications: Scheduled Medications[1]    Assessment & Plan:     Sujey Medina is a 75 year old female with PMH sig for DL, HTN, CKD III, MDD, GERD, bladder ca stage iv w/ mets to brain sp craniotomy and frontal tumor resection presents to ED at [facility] w/ weakness, sob,  and falls.     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 Daigle  - Follows with neurosurgery, oncology and radiation oncology outpatient  - On keppra PTA >> continue  - Elevated inflammatory markers and procal, temp to 100.2  - Neurosurgery consulted >> seen by Dr Daigle >> no neurosurgical intervention indicated   - MRI brain w/ and w/o contrast to be obtained from [facility] PACS or may consider repeating here if neuro status worsens  - trend fever  - cont iv abx as ordered  - ID following >> apprec recs    Recurrent Falls  Generalized Weakness  Dizziness  - Suspect related to above   - No obvious focal neurological deficits  - CTH with no acute findings  - Check keppra  level  - PT/OT as able    HTN  DL  -stable  -resume hoome meds as able     GED  -PPI     MDD  -resume home meds        Quality:  DVT Prophylaxis: lovenox, scds  CODE status: full code  If COVID testing is negative, may discontinue isolation: yes      Plan of care discussed with patient and , all questions answered.           Kayleigh Mills MD  [facility] Hospitalist  Pager [phone]  Answering Service number: [phone]                  [1]    cefepime  1 g Intravenous q12h    buPROPion SR  200 mg Oral Daily    busPIRone  5 mg Oral BID    famotidine  20 mg Oral Daily    metoprolol tartrate  25 mg Oral BID    atorvastatin  20 mg Oral Nightly    melatonin  3 mg Oral Nightly    heparin  5,000 Units Subcutaneous 2 times per day    dexamethasone  4 mg Oral BID    Followed by    [START ON 5/19/2025] dexamethasone  2 mg Oral BID    levetiracetam  1,000 mg Oral BID

## 2025-05-17 NOTE — PROGRESS NOTES
[facility]   part of [facility]  Neurological Surgery Progress Note    Sujey Medina Patient Status:  Inpatient    3/6/1950 MRN [id number]   Location [facility] 7NE-A Attending Kayleigh Mills MD   Hosp Day # 2 PCP Rajni Tiwari MD     Neurosurgical procedures to date:  Left frontal craniotomy for metastatic urothelial carcinoma resection (2025)    Subjective:  Patient states, “I feel exhausted--hard to breathe,” but denies headache, wound pain, or new focal weakness. She reports no chest pain and notes that people waking her overnight contributes to fatigue. No nausea, vomiting, or seizure activity. She is anxious about how quickly her illness progressed but agrees with the current plan to avoid further cranial surgery.    Objective:  Vital Signs:  Blood pressure 112/69, pulse 76, temperature 98.4 °F (36.9 °C), temperature source Oral, resp. rate 24, weight 139 lb 15.9 oz (63.5 kg), SpO2 96%, not currently breastfeeding.  General: No acute distress.  Respiratory: Non-labored respirations bilaterally. No audible wheezing  Cardiovascular: Extremities warm and well-perfused.  Abdomen: Soft, nontender, nondistended.   Musculoskeletal: Moves all extremities well, symmetrically.  Extremities: No edema.  Neurologic:  Alert & oriented × 3; cranial nerves II-XII intact; strength 5/5 all extremities with no drift; sensation intact; coordination normal; gait deferred for safety.    Output by Drain (mL) 05/15/25 0700 - 05/15/25 1859 05/15/25 1900 - 25 0659 25 0700 - 25 1859 25 1900 - 25 0659 25 0700 - 25 0739   Requested LDAs do not have output data documented.     Labs:  Recent Results (from the past 72 hours)   Creatinine, Serum    Collection Time: 05/15/25  7:53 PM   Result Value Ref Range    Creatinine 1.14 (H) 0.55 - 1.02 mg/dL    eGFR-Cr 50 (L) >=60 mL/min/1.73m2   Basic Metabolic Panel (8)    Collection Time: 25  5:46  AM   Result Value Ref Range    Glucose 144 (H) 70 - 99 mg/dL    Sodium 133 (L) 136 - 145 mmol/L    Potassium 3.6 3.5 - 5.1 mmol/L    Chloride 105 98 - 112 mmol/L    CO2 17.0 (L) 21.0 - 32.0 mmol/L    Anion Gap 11 0 - 18 mmol/L    BUN 18 9 - 23 mg/dL    Creatinine 1.03 (H) 0.55 - 1.02 mg/dL    Calcium, Total 8.3 (L) 8.7 - 10.6 mg/dL    Calculated Osmolality 280 275 - 295 mOsm/kg    eGFR-Cr 56 (L) >=60 mL/min/1.73m2   CBC With Differential With Platelet    Collection Time: 05/16/25  5:46 AM   Result Value Ref Range    WBC 6.8 4.0 - 11.0 x10(3) uL    RBC 3.21 (L) 3.80 - 5.30 x10(6)uL    HGB 10.8 (L) 12.0 - 16.0 g/dL    HCT 31.0 (L) 35.0 - 48.0 %    .0 (L) 150.0 - 450.0 10(3)uL    Immature Platelet Fraction 2.1 0.0 - 7.0 %    MCV 96.6 80.0 - 100.0 fL    MCH 33.6 26.0 - 34.0 pg    MCHC 34.8 31.0 - 37.0 g/dL    RDW 15.3 %    Neutrophil Absolute Prelim 5.94 1.50 - 7.70 x10 (3) uL   Creatinine, Serum    Collection Time: 05/16/25  5:46 AM   Result Value Ref Range    Creatinine 1.03 (H) 0.55 - 1.02 mg/dL    eGFR-Cr 56 (L) >=60 mL/min/1.73m2   Manual differential    Collection Time: 05/16/25  5:46 AM   Result Value Ref Range    Neutrophil Absolute Manual 6.19 1.50 - 7.70 x10(3) uL    Lymphocyte Absolute Manual 0.54 (L) 1.00 - 4.00 x10(3) uL    Monocyte Absolute Manual 0.00 (L) 0.10 - 1.00 x10(3) uL    Eosinophil Absolute Manual 0.07 0.00 - 0.70 x10(3) uL    Basophil Absolute Manual 0.00 0.00 - 0.20 x10(3) uL    Neutrophils % Manual 82 %    Band % 9 %    Lymphocyte % Manual 8 %    Monocyte % Manual 0 %    Eosinophil % Manual 1 %    Basophil % Manual 0 %    Total Cells Counted 100     RBC Morphology Normal Normal, Slide reviewed, see previous RBC morphology.    Platelet Morphology Normal Normal   Sed Rate, Westergren (Automated)    Collection Time: 05/16/25  5:46 AM   Result Value Ref Range    Sed Rate 87 (H) 0 - 30 mm/Hr   C-Reactive Protein    Collection Time: 05/16/25  5:46 AM   Result Value Ref Range    C-Reactive  Protein 30.20 (H) <=0.50 mg/dL   Potassium    Collection Time: 05/16/25  2:37 PM   Result Value Ref Range    Potassium 3.9 3.5 - 5.1 mmol/L   Creatinine, Serum    Collection Time: 05/17/25  5:56 AM   Result Value Ref Range    Creatinine 1.01 0.55 - 1.02 mg/dL    eGFR-Cr 58 (L) >=60 mL/min/1.73m2      Neurosurgical imaging independently reviewed:  MRI Brain w/ & w/o contrast 5/15/2025 (report only) -- Imaging unfortunately not available for my review. Report reviewed, stating postsurgical left frontal cavity with 6 × 16 mm overlying extra-axial collection exhibiting restricted diffusion and enhancement; favors postoperative fluid/blood products versus subdural empyema; no midline shift.    Assessment & Plan:  Problem List[1]    Sujey Medina is a(n) 75 year old female who is 4 weeks post left frontal metastatic tumor resection, now hospital day 2 for evaluation of a small postoperative extra-axial collection and bilateral pneumonia. Neurologically she remains intact without signs of intracranial hypertension or infection. Her respiratory compromise is secondary to multifocal bacterial pneumonia; inflammatory markers are elevated but stable, and renal function is adequate for ongoing antibiotic therapy. Prognosis for neurologic recovery is favorable provided pulmonary status improves and no radiographic progression occurs.    - Continue antibiotic regimen per ID.  - Defer repeat MRI unless neurologic exam worsens.  - Enforce strict fall precautions and request PT for bedside mobility training.  - Encourage incentive spirometry every two hours while awake to improve ventilation.  - Neurosurgery to follow. Please notify on-call neurosurgeon for any change in mental status, wound appearance, or focal deficit.    Nakul Garcia MD  Neurological Surgery    [facility]science [address] , [address] [address]  [phone]  Pager [phone]  5/17/2025 7:39 AM      This note  was created using a voice-recognition transcribing system. Incorrect words or phrases may have been missed during proofreading. Please interpret accordingly.       [1]   Patient Active Problem List  Diagnosis    Mixed hyperlipidemia    Multiple lung nodules    Bladder cancer metastasized to lung (HCC)    Stage 3 chronic kidney disease, unspecified whether stage 3a or 3b CKD (HCC)    Macrocytosis without anemia    Hyperparathyroidism, primary (HCC)    Osteopenia    History of squamous cell carcinoma in situ    Purpura    History of high blood pressure    Syncope, near    Urinary tract infection without hematuria, site unspecified    Cognitive disorder    Major depressive disorder, single episode, severe (HCC)    Generalized anxiety disorder    Brain mass    Seizure (HCC)    Subdural empyema (HCC)

## 2025-05-17 NOTE — PLAN OF CARE
Received pt at 1930  Pt Aox2-3, NSR, 2L NC, VSS  Pt feeling SOB. MD notified. PRN neb x1  IV abx   D/c Planning: pending clinical course  Call light within reach.  Needs currently met

## 2025-05-17 NOTE — PROGRESS NOTES
Patient is A/Ox4, forgetful. Old incision on head. Urostomy. IV ABX. Xray for increased shortness of breath. Vitals stable.

## 2025-05-17 NOTE — PROGRESS NOTES
Infectious Disease Progress Note      Date of admission: 5/15/2025  7:11 PM     Reason for consult: Antibiotic management    Referring physician: Kayleigh Mills MD    Subjective: Afebrile overnight.  Denies any headache.  Reports worsening shortness of breath like she is unable to take a big breath in.  Denies any cough.  No lower extremity pain or swelling.        Review of systems:  All other components of the review of systems are negative, except those described in the history of present illness.       Medications:  Current Hospital Medications[1]     Allergies:  Allergies[2]    Physical Exam:  Vitals:    05/17/25 0815   BP: 110/62   Pulse: 78   Resp: (!) 35   Temp: 97.6 °F (36.4 °C)     Physical Exam  Vitals reviewed.   Constitutional:       General: She is not in acute distress.     Appearance: She is ill-appearing.   HENT:      Head: Normocephalic.      Right Ear: External ear normal.      Left Ear: External ear normal.      Nose: Nose normal.      Mouth/Throat:      Mouth: Mucous membranes are moist.      Pharynx: No oropharyngeal exudate.   Eyes:      General: No scleral icterus.     Conjunctiva/sclera: Conjunctivae normal.   Cardiovascular:      Rate and Rhythm: Normal rate.      Pulses: Normal pulses.   Pulmonary:      Effort: Pulmonary effort is normal. No respiratory distress.      Breath sounds: Rales present. No wheezing or rhonchi.   Abdominal:      General: Abdomen is flat.   Musculoskeletal:         General: Normal range of motion.      Cervical back: Normal range of motion.      Right lower leg: No edema.      Left lower leg: No edema.   Skin:     General: Skin is warm.      Capillary Refill: Capillary refill takes less than 2 seconds.   Neurological:      Mental Status: She is alert and oriented to person, place, and time. Mental status is at baseline.   Psychiatric:         Mood and Affect: Mood normal.         Behavior: Behavior normal.         Thought Content: Thought content normal.          Judgment: Judgment normal.           Laboratory data:  I have reviewed all the lab results independently.  Lab Results   Component Value Date    CREATSERUM 1.01 2025    K 3.9 2025      Recent Labs   Lab 25  0546   RBC 3.21*   HGB 10.8*   HCT 31.0*   MCV 96.6   MCH 33.6   MCHC 34.8   RDW 15.3   NEPRELIM 5.94   WBC 6.8   .0*   NEUT 82   LYMPH 8   MON 0   EOS 1      Microbiology data:  No results found for this visit on 05/15/25.     Impression:  Sujey Medina is a 75 year old female with    Bilateral pneumonia--MRSA screen negative.  Urine Streptococcus and Legionella antigens negative.  Respiratory viral panel negative.  Question of subdural empyema in the setting of a recent craniotomy done on 2025 for metastatic cancer.  Evaluated by neurosurgery with no signs of intracranial hypertension or infection.  Recommended to defer any repeat MRI unless neurologic exam worsens.      Recommendations:    Continue IV cefepime for now.  Given worsening shortness of breath, will repeat chest x-ray.  Monitor labs for antibiotic toxicities.  Plan discussed with patient.  Questions answered.  Rest of management per primary team.     Thank you for this consultation. Please don't hesitate to call the ID team for questions or any acute changes in patient's clinical condition.    Darlene Agarwal MD, [facility]  [facility]. Tel: [phone]. Fax: [phone].     Sujey Medina : 3/6/1950 MRN: [id number] CSN: [id number]       The  Cures Act makes medical notes like these available to patients in the interest of transparency. However, be advised this is a medical document. It is intended as peer to peer communication. It is written in medical language and may contain abbreviations or verbiage that are unfamiliar. It may appear blunt or direct. Medical documents are intended to carry relevant information, facts as evident, and the clinical opinion of the practitioner.         [1]    cefepime    buPROPion SR    busPIRone    famotidine    metoprolol tartrate    atorvastatin    melatonin    guaiFENesin    albuterol    heparin    acetaminophen    dexamethasone **FOLLOWED BY** [START ON 5/19/2025] dexamethasone    levetiracetam  [2] No Known Allergies

## 2025-05-18 PROCEDURE — 97162 PT EVAL MOD COMPLEX 30 MIN: CPT

## 2025-05-18 PROCEDURE — 82565 ASSAY OF CREATININE: CPT | Performed by: STUDENT IN AN ORGANIZED HEALTH CARE EDUCATION/TRAINING PROGRAM

## 2025-05-18 PROCEDURE — 97116 GAIT TRAINING THERAPY: CPT

## 2025-05-18 NOTE — PHYSICAL THERAPY NOTE
PHYSICAL THERAPY EVALUATION - INPATIENT     Room Number: 7600/7600-A  Evaluation Date: 5/18/2025  Type of Evaluation: Initial  Physician Order: PT Eval and Treat    Presenting Problem: falls  Co-Morbidities : DL, HTN, CKD III, MDD, GERD, 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  Pt with mets to the lungs s/p bronch, R VATS with RML lobectomy on 1/24/2024.  Reason for Therapy: Mobility Dysfunction and Discharge Planning    PHYSICAL THERAPY ASSESSMENT   Patient is a 75 year old female admitted 5/15/2025 for falls.  Prior to admission, patient's baseline is reports has been ambulatory with RW at home, spouse has been providing supervision.  Patient is currently functioning below baseline with bed mobility, transfers, and gait.  Patient is requiring supervision and contact guard assist as a result of the following impairments: decreased functional strength, decreased endurance/aerobic capacity, decreased muscular endurance, and medical status.  Physical Therapy will continue to follow for duration of hospitalization.    Patient will benefit from continued skilled PT Services at discharge to promote prior level of function and safety with additional support and return home with OP PT.    PLAN DURING HOSPITALIZATION  Nursing Mobility Recommendation : 1 Assist  PT Device Recommendation: Rolling walker  PT Treatment Plan: Bed mobility, Body mechanics, Coordination, Endurance, Energy conservation, Patient education, Family education, Gait training, Range of motion, Neuromuscular re-educate, Strengthening, Transfer training, Balance training  Rehab Potential : Good  Frequency (Obs): 3-5x/week     CURRENT GOALS    Goal #1 Patient is able to demonstrate supine - sit EOB @ level: supervision     Goal #2 Patient is able to demonstrate transfers EOB to/from Chair/Wheelchair at assistance level: supervision     Goal #3  Patient is able to ambulate 150 feet with assist device: walker - rolling at assistance level: supervision     Goal #4    Goal #5    Goal #6    Goal Comments: Goals established on 2025      PHYSICAL THERAPY MEDICAL/SOCIAL HISTORY  History related to current admission: Patient is a 75 year old female admitted on 5/15/2025: Presents with inc weakness, dizziness and falls in the setting of L frontal crani for brain mets/tumor resection 25. Dx concern for subdural empyema near surgical cavity. Additionally with dyspnea and concern for PNA    [facility] -5/15/25: SOB  ED : fall   4/10-25: L frontal lobe mass with vasogenic edema s/p /p left image guided craniotaomy for brain mass on  > [facility]    HOME SITUATION  Type of Home: Condo  Home Layout: One level, Elevator                     Lives With: Spouse    Drives: Yes   Patient Regularly Uses: None      Prior Level of [place]: pt/spouse report multiple falls at home since previous admit, reports compliance with use of RW, states sometimes her legs give out or she trips  seemingly more with the LLE than the R    SUBJECTIVE  \"Sometimes I have trouble with my LLE\"     \"Home health only came once in 2 weeks\" - pts spouse, discussed going to OP for more consistent therapy to address higher level balance deficits      \" I can't even go to the store without worrying she's going to fall\" - pts spouse    OBJECTIVE  Precautions: Bed/chair alarm (urostomy)  Fall Risk: Standard fall risk\\    111/75 sitting  105/69 standing     WEIGHT BEARING RESTRICTION     PAIN ASSESSMENT  Ratin  Location: pt denies       COGNITION  Overall Cognitive Status:  WFL - within functional limits    RANGE OF MOTION AND STRENGTH ASSESSMENT  Upper extremity ROM and strength are within functional limits     Lower extremity ROM is within functional limits     Lower extremity strength is within functional limits     BALANCE  Static Sitting: Good  Dynamic Sitting: Good  Static  Standing: Fair -  Dynamic Standing: Fair -    ADDITIONAL TESTS                                    ACTIVITY TOLERANCE                         O2 WALK  Oxygen Therapy  SPO2% on Room Air at Rest: 94  SPO2% Ambulation on Room Air: 86    NEUROLOGICAL FINDINGS                        AM-PAC '6-Clicks' INPATIENT SHORT FORM - BASIC MOBILITY  How much difficulty does the patient currently have...  Patient Difficulty: Turning over in bed (including adjusting bedclothes, sheets and blankets)?: None   Patient Difficulty: Sitting down on and standing up from a chair with arms (e.g., wheelchair, bedside commode, etc.): A Little   Patient Difficulty: Moving from lying on back to sitting on the side of the bed?: A Little   How much help from another person does the patient currently need...   Help from Another: Moving to and from a bed to a chair (including a wheelchair)?: A Little   Help from Another: Need to walk in hospital room?: A Little   Help from Another: Climbing 3-5 steps with a railing?: A Little     AM-PAC Score:  Raw Score: 19   Approx Degree of Impairment: 41.77%   Standardized Score (AM-PAC Scale): 45.44   CMS Modifier (G-Code): CK    FUNCTIONAL ABILITY STATUS  Gait Assessment   Functional Mobility/Gait Assessment  Gait Assistance: Contact guard assist  Distance (ft): 100  Assistive Device: Rolling walker  Pattern: Shuffle    Skilled Therapy Provided     Bed Mobility:  Rolling: mod I   Supine to sit: CGA   Sit to supine: NT     Transfer Mobility:  Sit to stand: CGA from bed with inc time, SBA from bedside chair    Stand to sit: CGA  Gait = CGA progressing to SBA    Therapist's Comments: Discussed case with RN prior to session initiation. Pt agreeable to participation in therapy. Gait belt donned for out of bed mobility. pt educated on energy conservation techniques including slower gait speed, standing/seated rest breaks, pacing activities, and continued use of assistive device as pt demonstrating inc dyspnea with  minimal activity.  Desat to 86% on RA while performing 5xSTS and scored below age norms. Did not note any obvious deficits with LLE during gait training.       5x sit<>stand:  1 minute and 30 seconds   (Diya, 2006; individuals greater than 60 years of age, Community-Dwelling Elderly)  Estimate values for normal performance in community dwelling older adults  60-69 years: 11.4 sec (mean time)  70-79 years: 12.6 sec  80-89 years: 14.8 sec        Exercise/Education Provided:  Energy conservation  Functional activity tolerated  Gait training  Transfer training    Patient End of Session: Up in chair, Call light within reach, Needs met, RN aware of session/findings, All patient questions and concerns addressed, Hospital anti-slip socks, Alarm set, Family present      Patient Evaluation Complexity Level:  History Moderate - 1 or 2 personal factors and/or co-morbidities   Examination of body systems Moderate - addressing a total of 3 or more elements   Clinical Presentation  Moderate - Evolving   Clinical Decision Making Moderate Complexity       PT Session Time: 24 minutes  Gait Training: 10 minutes  Therapeutic Activity:  minutes  Neuromuscular Re-education:  minutes  Therapeutic Exercise:  minutes

## 2025-05-18 NOTE — PROGRESS NOTES
[facility]   part of [facility] Hospitalist Progress Note     Sujey Medina Patient Status:  Inpatient    3/6/1950 MRN [id number]   Location [facility] 7NE-A Attending Kayleigh Mills MD   Hosp Day # 3 PCP Rajni Tiwari MD     Subjective:     Patient seen and examined.   More awake and alert this am  No issues overnight  Says she feels overall improved  Has not worked with PT yet  afebrile    Objective:    Review of Systems:   10 point ROS completed and was negative, except for pertinent positive and negatives stated in subjective.    Vital signs:  Temp:  [97.6 °F (36.4 °C)-97.9 °F (36.6 °C)] 97.8 °F (36.6 °C)  Pulse:  [72-96] 80  Resp:  [22-36] 26  BP: (105-116)/(58-70) 115/70  SpO2:  [92 %-95 %] 92 %  HEENT: craniotomy scar visible, clean dry intact, staple.  Neck: No lymphadenopathy. No JVD. No carotid bruits.  Respiratory: Clear to auscultation bilaterally. No wheezes. No rhonchi.  Cardiovascular: S1, S2. Regular rate and rhythm. No murmurs, rubs or gallops. Equal pulses.   Chest and Back: No tenderness or deformity.  Abdomen: Soft, nontender, nondistended.  Positive bowel sounds. No rebound, guarding or organomegaly.  Neurologic: No focal neurological deficits. CNII-XII grossly intact.  Musculoskeletal: Moves all extremities.  Extremities: No edema or cyanosis.  Integument: No rashes or lesions.   Psychiatric: Appropriate mood and affect.      Diagnostic Data:    Labs:  Recent Labs   Lab 25  0546   WBC 6.8   HGB 10.8*   MCV 96.6   .0*   BAND 9       Recent Labs   Lab 25  0546 25  1437 25  0556 25  0714   *  --   --   --    BUN 18  --   --   --    CREATSERUM 1.03*  1.03*  --  1.01 1.01   CA 8.3*  --   --   --    *  --   --   --    K 3.6 3.9  --   --      --   --   --    CO2 17.0*  --   --   --        Estimated Creatinine Clearance: 42.6 mL/min (based on SCr of 1.01 mg/dL).    No results for input(s): \"PTP\", \"INR\"  in the last 168 hours.         COVID-19 Lab Results    COVID-19  Lab Results   Component Value Date    COVID19 Not Detected 10/25/2022    COVID19 Not Detected 10/20/2022    COVID19 Not Detected 10/19/2022       Pro-Calcitonin  No results for input(s): \"PCT\" in the last 168 hours.    Cardiac  No results for input(s): \"TROP\", \"PBNP\" in the last 168 hours.    Creatinine Kinase  No results for input(s): \"CK\" in the last 168 hours.    Inflammatory Markers  Recent Labs   Lab 05/16/25  0546   CRP 30.20*       Imaging: Imaging data reviewed in Epic.    Medications: Scheduled Medications[1]    Assessment & Plan:     Sujey Medina is a 75 year old female with PMH sig for DL, HTN, CKD III, MDD, GERD, bladder ca stage iv w/ mets to brain sp craniotomy and frontal tumor resection presents to ED at [facility] w/ weakness, sob,  and falls.     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 Daigle  - Follows with neurosurgery, oncology and radiation oncology outpatient  - On keppra PTA >> continue  - Elevated inflammatory markers and procal, temp to 100.2  - Neurosurgery consulted >> seen by Dr Daigle >> no neurosurgical intervention indicated   - MRI brain w/ and w/o contrast to be obtained from [facility] PACS or may consider repeating here if neuro status worsens  - trend fever  - cont iv abx as ordered  - ID following >> apprec recs  -PT/OT    Recurrent Falls  Generalized Weakness  Dizziness  - Suspect related to above   - No obvious focal neurological deficits  - CTH with no acute  findings  - Check keppra level  - PT/OT as able    HTN  DL  -stable  -resume hoome meds as able     GED  -PPI     MDD  -resume home meds        Quality:  DVT Prophylaxis: lovenox, scds  CODE status: full code  If COVID testing is negative, may discontinue isolation: yes      DISPO:  Dc planning in process  Pt/ot  Cont abx as ordered  Pulm and NSGY following    Plan of care discussed with patient and , all questions answered.           Kayleigh Mills MD  Critical access hospital Hospitalist  Pager [phone]  Answering Service number: [phone]                  [1]    cefepime  1 g Intravenous q12h    buPROPion SR  200 mg Oral Daily    busPIRone  5 mg Oral BID    famotidine  20 mg Oral Daily    metoprolol tartrate  25 mg Oral BID    atorvastatin  20 mg Oral Nightly    melatonin  3 mg Oral Nightly    heparin  5,000 Units Subcutaneous 2 times per day    dexamethasone  4 mg Oral BID    Followed by    [START ON 5/19/2025] dexamethasone  2 mg Oral BID    levetiracetam  1,000 mg Oral BID

## 2025-05-18 NOTE — PROGRESS NOTES
Infectious Disease Progress Note      Date of admission: 5/15/2025  7:11 PM     Reason for consult: Antibiotic management    Referring physician: Kayleigh Mills MD    Subjective: Afebrile overnight.  Denies any headache.  She tells me that her breathing feels better today; minimal sputum production.  Remains on 2 L of O2, stable during this admission.        Review of systems:  All other components of the review of systems are negative, except those described in the history of present illness.       Medications:  Current Hospital Medications[1]     Allergies:  Allergies[2]    Physical Exam:  Vitals:    05/18/25 0815   BP: 125/72   Pulse: 77   Resp: (!) 35   Temp: 98.8 °F (37.1 °C)     Physical Exam  Vitals reviewed.   Constitutional:       General: She is not in acute distress.     Appearance: She is ill-appearing.   HENT:      Head: Normocephalic.      Right Ear: External ear normal.      Left Ear: External ear normal.      Nose: Nose normal.      Mouth/Throat:      Mouth: Mucous membranes are moist.      Pharynx: No oropharyngeal exudate.   Eyes:      General: No scleral icterus.     Conjunctiva/sclera: Conjunctivae normal.   Cardiovascular:      Rate and Rhythm: Normal rate.      Pulses: Normal pulses.   Pulmonary:      Effort: Pulmonary effort is normal. No respiratory distress.      Breath sounds: Rales present. No wheezing or rhonchi.   Abdominal:      General: Abdomen is flat.   Musculoskeletal:         General: Normal range of motion.      Cervical back: Normal range of motion.      Right lower leg: No edema.      Left lower leg: No edema.   Skin:     General: Skin is warm.      Capillary Refill: Capillary refill takes less than 2 seconds.   Neurological:      Mental Status: She is alert and oriented to person, place, and time. Mental status is at baseline.   Psychiatric:         Mood and Affect: Mood normal.         Behavior: Behavior normal.         Thought Content: Thought content normal.          Judgment: Judgment normal.           Laboratory data:  I have reviewed all the lab results independently.  Lab Results   Component Value Date    CREATSERUM 1.01 2025      Recent Labs   Lab 25  0546   RBC 3.21*   HGB 10.8*   HCT 31.0*   MCV 96.6   MCH 33.6   MCHC 34.8   RDW 15.3   NEPRELIM 5.94   WBC 6.8   .0*   NEUT 82   LYMPH 8   MON 0   EOS 1      Microbiology data:  No results found for this visit on 05/15/25.     Impression:  Sujey Medina is a 75 year old female with    Bilateral pneumonia--MRSA screen negative.  Urine Streptococcus and Legionella antigens negative.  Respiratory viral panel negative.  Developing airspace opacities and air bronchograms within the right upper lobe suggestive of pneumonia/consolidation.  Question of subdural empyema in the setting of a recent craniotomy done on 2025 for metastatic cancer.  Evaluated by neurosurgery with no signs of intracranial hypertension or infection.  Recommended to defer any repeat MRI unless neurologic exam worsens.      Recommendations:    Continue IV cefepime for now.  Send for sputum cultures if able to expectorate.  Continue to monitor symptoms and respiratory status.  Monitor labs for antibiotic toxicities.  Plan discussed with patient.  Questions answered.  Rest of management per primary team.     Thank you for this consultation. Please don't hesitate to call the ID team for questions or any acute changes in patient's clinical condition.    Darlene Agarwal MD, [facility]. Tel: [phone]. Fax: [phone].     Sujey Medina : 3/6/1950 MRN: [id number] CSN: [id number]       The  [employer] Cures Act makes medical notes like these available to patients in the interest of transparency. However, be advised this is a medical document. It is intended as peer to peer communication. It is written in medical language and may contain abbreviations or verbiage that are unfamiliar. It may appear blunt or  direct. Medical documents are intended to carry relevant information, facts as evident, and the clinical opinion of the practitioner.         [1]   cefepime    buPROPion SR    busPIRone    famotidine    metoprolol tartrate    atorvastatin    melatonin    guaiFENesin    albuterol    heparin    acetaminophen    dexamethasone **FOLLOWED BY** [START ON 5/19/2025] dexamethasone    levetiracetam  [2] No Known Allergies

## 2025-05-18 NOTE — PLAN OF CARE
Received patient awake and alert x 3 in bed.   Normal sinus rhyhtm on Telemetry monitoring  O2 protocol, , sats 95% on 2 LNC with crackles on RUL.   Fall precaution, bed alarm on, non-skid socks on,  call light and bedside table within reach.  Monitor vitals, labs, intake and output.  Electrolyte protocol. K 3.9  Heparin and SCD for VTE.  Nebs prn  Monitor urostomy output.  IV antibiotics Cefepime for Pneumonia.   Physical therapy evaluation and treat.  No fever this shift.        Problem: Patient/Family Goals  Goal: Patient/Family Long Term Goal  Description: Patient's Long Term Goal: Discharge home when medically stable.    Interventions:  Telemetry monitoring  O2 protocol,   Fall precaution, bed alarm on, non-skid socks on,  call light and bedside table within reach.  Monitor vitals, labs, intake and output.  Electrolyte protocol.  Heparin and SCD for VTE.  Nebs prn  Monitor urostomy output.  IV antibiotics.  Physical therapy evaluation and treat.     - See additional Care Plan goals for specific interventions  Outcome: Progressing  Goal: Patient/Family Short Term Goal  Description: Patient's Short Term Goal: Vital signs stable this shift    Interventions:   Telemetry monitoring  O2 protocol,   Fall precaution, bed alarm on, non-skid socks on,  call light and bedside table within reach.  Monitor vitals, labs, intake and output.  Electrolyte protocol.  Heparin and SCD for VTE.  Nebs prn  Monitor urostomy output.  IV antibiotics.  Physical therapy evaluation and treat.     - See additional Care Plan goals for specific interventions  Outcome: Progressing     Problem: CARDIOVASCULAR - ADULT  Goal: Absence of cardiac arrhythmias or at baseline  Description: INTERVENTIONS:  - Continuous cardiac monitoring, monitor vital signs, obtain 12 lead EKG if indicated  - Evaluate effectiveness of antiarrhythmic and heart rate control medications as ordered  - Initiate emergency measures for life threatening  arrhythmias  - Monitor electrolytes and administer replacement therapy as ordered  Outcome: Progressing     Problem: RESPIRATORY - ADULT  Goal: Achieves optimal ventilation and oxygenation  Description: INTERVENTIONS:  - Assess for changes in respiratory status  - Assess for changes in mentation and behavior  - Position to facilitate oxygenation and minimize respiratory effort  - Oxygen supplementation based on oxygen saturation or ABGs  - Provide Smoking Cessation handout, if applicable  - Encourage broncho-pulmonary hygiene including cough, deep breathe, Incentive Spirometry  - Assess the need for suctioning and perform as needed  - Assess and instruct to report SOB or any respiratory difficulty  - Respiratory Therapy support as indicated  - Manage/alleviate anxiety  - Monitor for signs/symptoms of CO2 retention  Outcome: Progressing

## 2025-05-18 NOTE — PROGRESS NOTES
[facility]   part of [facility]  Neurological Surgery Progress Note    Sujey Medina Patient Status:  Inpatient    3/6/1950 MRN [id number]   Location [facility] 7NE-A Attending Kayleigh Mills MD   Hosp Day # 3 PCP Rajni Tiwari MD     Neurosurgical procedures to date:  Left frontal craniotomy for metastatic urothelial carcinoma resection (2025)    Subjective:  Feeling better. Remains afebrile without new neurologic complaints.     Objective:  Vital Signs:  Blood pressure 125/72, pulse 77, temperature 98.8 °F (37.1 °C), temperature source Oral, resp. rate (!) 35, weight 139 lb 15.9 oz (63.5 kg), SpO2 94%, not currently breastfeeding.  General: No acute distress.  Respiratory: Non-labored respirations bilaterally. No audible wheezing  Cardiovascular: Extremities warm and well-perfused.  Abdomen: Soft, nontender, nondistended.   Musculoskeletal: Moves all extremities well, symmetrically.  Extremities: No edema.  Neurologic:  Alert & oriented × 3; cranial nerves II-XII intact; strength 5/5 all extremities with no drift; sensation intact; coordination normal; gait deferred for safety.    Output by Drain (mL) 25 0700 - 25 1859 25 1900 - 25 0659 25 0700 - 25 1859 25 1900 - 25 0659 25 0700 - 25 1200   Requested LDAs do not have output data documented.     Labs:  Recent Results (from the past 72 hours)   Creatinine, Serum    Collection Time: 05/15/25  7:53 PM   Result Value Ref Range    Creatinine 1.14 (H) 0.55 - 1.02 mg/dL    eGFR-Cr 50 (L) >=60 mL/min/1.73m2   Basic Metabolic Panel (8)    Collection Time: 25  5:46 AM   Result Value Ref Range    Glucose 144 (H) 70 - 99 mg/dL    Sodium 133 (L) 136 - 145 mmol/L    Potassium 3.6 3.5 - 5.1 mmol/L    Chloride 105 98 - 112 mmol/L    CO2 17.0 (L) 21.0 - 32.0 mmol/L    Anion Gap 11 0 - 18 mmol/L    BUN 18 9 - 23 mg/dL    Creatinine 1.03 (H) 0.55 - 1.02 mg/dL     Calcium, Total 8.3 (L) 8.7 - 10.6 mg/dL    Calculated Osmolality 280 275 - 295 mOsm/kg    eGFR-Cr 56 (L) >=60 mL/min/1.73m2   CBC With Differential With Platelet    Collection Time: 05/16/25  5:46 AM   Result Value Ref Range    WBC 6.8 4.0 - 11.0 x10(3) uL    RBC 3.21 (L) 3.80 - 5.30 x10(6)uL    HGB 10.8 (L) 12.0 - 16.0 g/dL    HCT 31.0 (L) 35.0 - 48.0 %    .0 (L) 150.0 - 450.0 10(3)uL    Immature Platelet Fraction 2.1 0.0 - 7.0 %    MCV 96.6 80.0 - 100.0 fL    MCH 33.6 26.0 - 34.0 pg    MCHC 34.8 31.0 - 37.0 g/dL    RDW 15.3 %    Neutrophil Absolute Prelim 5.94 1.50 - 7.70 x10 (3) uL   Creatinine, Serum    Collection Time: 05/16/25  5:46 AM   Result Value Ref Range    Creatinine 1.03 (H) 0.55 - 1.02 mg/dL    eGFR-Cr 56 (L) >=60 mL/min/1.73m2   Manual differential    Collection Time: 05/16/25  5:46 AM   Result Value Ref Range    Neutrophil Absolute Manual 6.19 1.50 - 7.70 x10(3) uL    Lymphocyte Absolute Manual 0.54 (L) 1.00 - 4.00 x10(3) uL    Monocyte Absolute Manual 0.00 (L) 0.10 - 1.00 x10(3) uL    Eosinophil Absolute Manual 0.07 0.00 - 0.70 x10(3) uL    Basophil Absolute Manual 0.00 0.00 - 0.20 x10(3) uL    Neutrophils % Manual 82 %    Band % 9 %    Lymphocyte % Manual 8 %    Monocyte % Manual 0 %    Eosinophil % Manual 1 %    Basophil % Manual 0 %    Total Cells Counted 100     RBC Morphology Normal Normal, Slide reviewed, see previous RBC morphology.    Platelet Morphology Normal Normal   Sed Rate, Westergren (Automated)    Collection Time: 05/16/25  5:46 AM   Result Value Ref Range    Sed Rate 87 (H) 0 - 30 mm/Hr   C-Reactive Protein    Collection Time: 05/16/25  5:46 AM   Result Value Ref Range    C-Reactive Protein 30.20 (H) <=0.50 mg/dL   Potassium    Collection Time: 05/16/25  2:37 PM   Result Value Ref Range    Potassium 3.9 3.5 - 5.1 mmol/L   Creatinine, Serum    Collection Time: 05/17/25  5:56 AM   Result Value Ref Range    Creatinine 1.01 0.55 - 1.02 mg/dL    eGFR-Cr 58 (L) >=60 mL/min/1.73m2    Creatinine, Serum    Collection Time: 05/18/25  7:14 AM   Result Value Ref Range    Creatinine 1.01 0.55 - 1.02 mg/dL    eGFR-Cr 58 (L) >=60 mL/min/1.73m2   RAINBOW DRAW LAVENDER    Collection Time: 05/18/25  7:14 AM   Result Value Ref Range    Hold Lavender Auto Resulted       Neurosurgical imaging independently reviewed:  MRI Brain w/ & w/o contrast 5/15/2025 (report only) -- Imaging unfortunately not available for my review. Report reviewed, stating postsurgical left frontal cavity with 6 × 16 mm overlying extra-axial collection exhibiting restricted diffusion and enhancement; favors postoperative fluid/blood products versus subdural empyema; no midline shift.    Assessment & Plan:  Problem List[1]    Sujey Medina is a(n) 75 year old female who is 4 weeks post left frontal metastatic tumor resection, now hospital day 3 for evaluation of a small postoperative extra-axial collection and bilateral pneumonia. Neurologically she remains intact without signs of intracranial hypertension or infection. Her respiratory compromise is secondary to multifocal bacterial pneumonia; inflammatory markers are elevated but stable, and renal function is adequate for ongoing antibiotic therapy. Prognosis for neurologic recovery is favorable provided pulmonary status improves and no radiographic progression occurs.    - Continue antibiotic regimen per ID.  - Defer repeat MRI unless neurologic exam worsens.  - Enforce strict fall precautions and request PT for bedside mobility training.  - Encourage incentive spirometry every two hours while awake to improve ventilation.  - Neurosurgery to follow. Please notify on-call neurosurgeon for any change in mental status, wound appearance, or focal deficit.    Nakul Garcia MD  Neurological Surgery    [place] [address] [address] , [address] [address]  [phone]  Pager [phone]  5/18/2025 12:00 PM      This note was created using a  voice-recognition transcribing system. Incorrect words or phrases may have been missed during proofreading. Please interpret accordingly.       [1]   Patient Active Problem List  Diagnosis    Mixed hyperlipidemia    Multiple lung nodules    Bladder cancer metastasized to lung (HCC)    Stage 3 chronic kidney disease, unspecified whether stage 3a or 3b CKD (HCC)    Macrocytosis without anemia    Hyperparathyroidism, primary (HCC)    Osteopenia    History of squamous cell carcinoma in situ    Purpura    History of high blood pressure    Syncope, near    Urinary tract infection without hematuria, site unspecified    Cognitive disorder    Major depressive disorder, single episode, severe (HCC)    Generalized anxiety disorder    Brain mass    Seizure (HCC)    Subdural empyema (HCC)

## 2025-05-19 LAB
CREAT BLD-MCNC: 1.11 MG/DL (ref 0.55–1.02)
EGFRCR SERPLBLD CKD-EPI 2021: 52 ML/MIN/1.73M2 (ref 60–?)

## 2025-05-19 PROCEDURE — 94760 N-INVAS EAR/PLS OXIMETRY 1: CPT

## 2025-05-19 PROCEDURE — 82565 ASSAY OF CREATININE: CPT | Performed by: STUDENT IN AN ORGANIZED HEALTH CARE EDUCATION/TRAINING PROGRAM

## 2025-05-19 PROCEDURE — 97116 GAIT TRAINING THERAPY: CPT

## 2025-05-19 RX ORDER — BISACODYL 10 MG
10 SUPPOSITORY, RECTAL RECTAL ONCE
Status: COMPLETED | OUTPATIENT
Start: 2025-05-19 | End: 2025-05-19

## 2025-05-19 RX ORDER — POLYETHYLENE GLYCOL 3350 17 G/17G
17 POWDER, FOR SOLUTION ORAL DAILY
Status: DISCONTINUED | OUTPATIENT
Start: 2025-05-19 | End: 2025-05-19

## 2025-05-19 RX ORDER — POLYETHYLENE GLYCOL 3350 17 G/17G
17 POWDER, FOR SOLUTION ORAL DAILY PRN
Status: DISCONTINUED | OUTPATIENT
Start: 2025-05-19 | End: 2025-05-27

## 2025-05-19 NOTE — PROGRESS NOTES
[facility]  Neurosurgery Progress Note    Sujey Medina Patient Status:  Inpatient    3/6/1950 MRN [id number]   Location [facility] 7NE-A Attending Kayleigh Mills MD   Hosp Day # 4 PCP Rajni Tiwari MD     Chief Complaint:  Post operative pneumonia    S/p Left frontal craniotomy for metastatic urothelial carcinoma resection (2025)     Subjective:  Afebrile overnight. No new events.     Objective/Physical Exam:    Vital Signs:  Blood pressure 119/77, pulse 76, temperature 98.1 °F (36.7 °C), temperature source Oral, resp. rate 24, weight 139 lb 15.9 oz (63.5 kg), SpO2 92%, not currently breastfeeding.  Respiratory:  nonlabored  CV:  well perfused  General: NAD, Speech Fluent, Mood Appropriate  Neurologic:   A&Ox2  PERRL, EOMi, FS, TM  Full strength x 4, no drift    Labs:  Recent Labs   Lab 25  0546   RBC 3.21*   HGB 10.8*   HCT 31.0*   MCV 96.6   MCH 33.6   MCHC 34.8   RDW 15.3   NEPRELIM 5.94   WBC 6.8   .0*       Recent Labs   Lab 25  0546 25  1437 25  0556 25  0714 25  0607   *  --   --   --   --    BUN 18  --   --   --   --    CREATSERUM 1.03*  1.03*  --  1.01 1.01 1.11*   EGFRCR 56*  56*  --  58* 58* 52*   CA 8.3*  --   --   --   --    *  --   --   --   --    K 3.6 3.9  --   --   --      --   --   --   --    CO2 17.0*  --   --   --   --        Imaging:  XR CHEST AP PORTABLE  (CPT=71045)  Result Date: 2025  CONCLUSION:  There are noted to be developing airspace opacities and air bronchograms within the right upper lobe suggestive of pneumonia/consolidation.   LOCATION:  [facility]      Dictated by (CST): Nory Ponce DO on 2025 at 11:50 AM     Finalized by (CST): Nory Ponce DO on 2025 at 11:51 AM       XR CHEST AP PORTABLE  (CPT=71045)  Result Date: 2025  CONCLUSION:  See above.  A preliminary report was provided by [facility].    LOCATION:  [facility]      Dictated by (CST): Partha Moe MD on  5/13/2025 at 8:06 AM     Finalized by (CST): Partha Moe MD on 5/13/2025 at 8:08 AM       CT BRAIN OR HEAD (CPT=70450)  Result Date: 5/12/2025  PROCEDURE:  CT BRAIN OR HEAD (13623)  COMPARISON:  [facility] , CT, CT BRAIN OR HEAD (89789), 4/17/2025, 0:54 AM.  INDICATIONS:  Discussed with Dr. Sky, no neck pain, no LOC, no blood thinners  TECHNIQUE:  Noncontrast CT scanning is performed through the brain. Dose reduction techniques were used. Dose information is transmitted to the ACR (American College of Radiology) NRDR (National Radiology Data Registry) which includes the Dose Index Registry.  PATIENT STATED HISTORY: (As transcribed by Technologist)  Patient presents for evaluation of mechanical fall that occured today, hit head. History of brain surgery x2 weeks ago.    FINDINGS: Sequelae of left frontal craniotomy is noted.  Vasogenic in left frontal lobe is noted.  Volume loss the left frontal lobe is noted.  Previously noted region of pneumocephalus with volume loss now measures 2.2 x 1.5 cm.  Extra-axial intermediate density material adjacent to the craniotomy site measures 6 mm.  Lucencies in the deep periventricular white matter are likely sequelae of chronic small vessel ischemic disease. Mild prominence of the sulci. Visualized portions of paranasal sinuses are unremarkable. Visualized portions of the mastoid air cells are unremarkable. Visualized portions of the orbits are unremarkable. IMPRESSION: Left frontal craniotomy is noted.  Vasogenic edema and regions of volume loss are again identified.  Extra-axial material adjacent to the left frontal craniotomy is likely postoperative in nature.  Decreased pneumocephalus.    LOCATION:  [facility]   Dictated by (CST): Magdi Martinez MD on 5/12/2025 at 7:20 PM     Finalized by (CST): Magdi Martinez MD on 5/12/2025 at 7:24 PM            Assessment:  Sujey Medina is a(n) 75 year old female who is 4 weeks post left frontal metastatic tumor resection, now  admitted for evaluation of a small postoperative extra-axial collection and bilateral pneumonia.     Plan:  Medical management per Hospitalist  Continue antibiotics per ID  Can repeat MRI brain if neurologic exam worsens  Therapy as tolerated      Is this a shared or split note between Advanced Practice Provider and Physician? Yes       ADALBERTO Gray  [facility]  5/19/2025, 8:05 AM  [place] [id number]   The examined with nurse practitioner  Case discussed  She is doing great  Awake alert  She is just tired  Follows commands x 4  Following

## 2025-05-19 NOTE — PROGRESS NOTES
[facility]   part of [facility] Hospitalist Progress Note     Sujey Medina Patient Status:  Inpatient    3/6/1950 MRN [id number]   Location [facility] 7NE-A Attending Kayleigh Mills MD   Hosp Day # 4 PCP Rajni Tiwari MD     Subjective:     Patient seen and examined.   More awake and alert this am  No issues overnight  Says she feels overall improved  afebrile    Objective:    Review of Systems:   10 point ROS completed and was negative, except for pertinent positive and negatives stated in subjective.    Vital signs:  Temp:  [97.5 °F (36.4 °C)-98.3 °F (36.8 °C)] 97.9 °F (36.6 °C)  Pulse:  [67-83] 67  Resp:  [20-34] 27  BP: (101-128)/(62-77) 106/67  SpO2:  [90 %-96 %] 93 %  HEENT: craniotomy scar visible, clean dry intact, staple.  Neck: No lymphadenopathy. No JVD. No carotid bruits.  Respiratory: Clear to auscultation bilaterally. No wheezes. No rhonchi.  Cardiovascular: S1, S2. Regular rate and rhythm. No murmurs, rubs or gallops. Equal pulses.   Chest and Back: No tenderness or deformity.  Abdomen: Soft, nontender, nondistended.  Positive bowel sounds. No rebound, guarding or organomegaly.  Neurologic: No focal neurological deficits. CNII-XII grossly intact.  Musculoskeletal: Moves all extremities.  Extremities: No edema or cyanosis.  Integument: No rashes or lesions.   Psychiatric: Appropriate mood and affect.      Diagnostic Data:    Labs:  Recent Labs   Lab 25  0546   WBC 6.8   HGB 10.8*   MCV 96.6   .0*   BAND 9       Recent Labs   Lab 25  0546 25  1437 25  0556 25  0714 25  0607   *  --   --   --   --    BUN 18  --   --   --   --    CREATSERUM 1.03*  1.03*  --  1.01 1.01 1.11*   CA 8.3*  --   --   --   --    *  --   --   --   --    K 3.6 3.9  --   --   --      --   --   --   --    CO2 17.0*  --   --   --   --        Estimated Creatinine Clearance: 38.8 mL/min (A) (based on SCr of 1.11 mg/dL (H)).    No  results for input(s): \"PTP\", \"INR\" in the last 168 hours.         COVID-19 Lab Results    COVID-19  Lab Results   Component Value Date    COVID19 Not Detected 10/25/2022    COVID19 Not Detected 10/20/2022    COVID19 Not Detected 10/19/2022       Pro-Calcitonin  No results for input(s): \"PCT\" in the last 168 hours.    Cardiac  No results for input(s): \"TROP\", \"PBNP\" in the last 168 hours.    Creatinine Kinase  No results for input(s): \"CK\" in the last 168 hours.    Inflammatory Markers  Recent Labs   Lab 05/16/25  0546   CRP 30.20*       Imaging: Imaging data reviewed in Epic.    Medications: Scheduled Medications[1]    Assessment & Plan:     Sujey Medina is a 75 year old female with PMH sig for DL, HTN, CKD III, MDD, GERD, bladder ca stage iv w/ mets to brain sp craniotomy and frontal tumor resection presents to ED at [facility] w/ weakness, sob,  and falls.     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 Daigle  - Follows with neurosurgery, oncology and radiation oncology outpatient  - On keppra PTA >> continue  - Neurosurgery consulted >> seen by Dr Daigle >> no neurosurgical intervention indicated   - MRI brain w/ and w/o contrast to be obtained from [facility] PACS or may consider repeating here if neuro status worsens  -dw dr bourgeois at bedside, no further work up now, and no surgical intervention needed at this time, fu in 2 wks  - trend fever  - cont iv abx as ordered  - ID following >> apprec recs  -PT/OT    Recurrent Falls  Generalized Weakness  Dizziness  -  Suspect related to above   - No obvious focal neurological deficits  - CTH with no acute findings  - Check keppra level  - PT/OT assessment in progress >> will dw  regarding care at home vs rehab    HTN  DL  -stable  -resume hoome meds as able     GERD  -PPI     MDD  -resume home meds        Quality:  DVT Prophylaxis: lovenox, scds  CODE status: full code  If COVID testing is negative, may discontinue isolation: yes      DISPO:  Dc planning in process  Pt/ot  Cont abx as ordered  Pulm and NSGY following    Plan of care discussed with patient and , all questions answered.           Kayleigh Mills MD  [facility] Hospitalist  Pager [phone]  Answering Service number: [phone]                  [1]    cefepime  1 g Intravenous q12h    buPROPion SR  200 mg Oral Daily    busPIRone  5 mg Oral BID    famotidine  20 mg Oral Daily    metoprolol tartrate  25 mg Oral BID    atorvastatin  20 mg Oral Nightly    melatonin  3 mg Oral Nightly    heparin  5,000 Units Subcutaneous 2 times per day    dexamethasone  2 mg Oral BID    levetiracetam  1,000 mg Oral BID

## 2025-05-19 NOTE — PLAN OF CARE
Assumed care at 1930,  Patient is alert and oriented X 4  On 2L of O2, SR on tele  Denies any pain or discomfort at this time  IV ABX maintained  Call light within reach, questions answered, all needs met  POC discussed with family.

## 2025-05-19 NOTE — PROGRESS NOTES
[facility]   part of [facility] Infectious Disease  Progress Note    Sujey Medina Patient Status:  Inpatient    3/6/1950 MRN [id number]   Location [facility] 7NE-A Attending Kayleigh Mills MD   Hosp Day # 4 PCP Rajni Tiwari MD     Subjective:  Patient seen and examined sitting up in bed. Feeling a little better today. Still with some cough and SOB. Tolerating IV antibiotics. Afebrile. Denies n/v/d. Otherwise no new complaints.     Objective:  Blood pressure 106/67, pulse 67, temperature 97.9 °F (36.6 °C), temperature source Oral, resp. rate (!) 27, weight 139 lb 15.9 oz (63.5 kg), SpO2 93%, not currently breastfeeding.    Intake/Output:    Intake/Output Summary (Last 24 hours) at 2025 0829  Last data filed at 2025 0400  Gross per 24 hour   Intake --   Output 1380 ml   Net -1380 ml       Physical Exam:  General: Awake, alert, non-tox, NAD.  HEENT:  Oropharynx clear, trachea ML.  Heart: RRR S1S2 no murmurs.  Lungs: Essentially CTA b/l, no rhonchi, rales, wheezes.  Abdomen: Soft, NT/ND.  BS present.  No guarding or rebound.  Extremity: No edema.  Neurological: No focal deficits.  Derm:  Warm and dry.    Lab Data Review:  Lab Results   Component Value Date    CREATSERUM 1.11 2025        Cultures:  No results found for this visit on 05/15/25.    Radiology:  XR CHEST AP PORTABLE  (CPT=71045)  Result Date: 2025  CONCLUSION:  There are noted to be developing airspace opacities and air bronchograms within the right upper lobe suggestive of pneumonia/consolidation.   LOCATION:  [place]      Dictated by (CST): Nory Ponce DO on 2025 at 11:50 AM     Finalized by (CST): Nory Ponce DO on 2025 at 11:51 AM       Assessment and Plan:  1. B/l pneumonia  - MRSA PCR negative.  - Urine strep pneumo and Legionella Ag negative.  - RVP at [facility] was negative.   - IV cefepime, ongoing.     2. Question of subdural empyema  - This is in the setting of a craniotomy  done on 4/17/25 for metastatic cancer.   - Patient is not having any headaches, fevers or mental status changes arguing against this being an infected collection.  - Neurosurgery following -- no signs of intracranial hypertension or infection, recommend to defer any repeat MRI unless neurologic exam worsens.    3. Recs  - Continue IV cefepime. Plan to treat with 7 day total course of antibiotics.   - Send sputum for culture if able.  - F/u WBC and fever curve.  - F/u cultures.  - Supportive care as per the primary team.  - Discussed plan of care with nursing.  - Discussed plan of care with patient. All questions addressed and understanding verbalized.  - Further recommendations pending clinical course.     Discussed case with ID attending/collaborating physician, Dr. Tommy Carlton, who is in agreement with the above plan of care    Please note that this report has been produced using speech recognition software and may contain errors related to that system including, but not limited to, errors in grammar, punctuation, and spelling, as well as words and phrases that possibly may have been recognized inappropriately.  If there are any questions or concerns, contact the dictating provider for clarification.     The 21st Century Cures Act makes medical notes like these available to patients in the interest of transparency. Please be advised this is a medical document. Medical documents are intended to carry relevant information, facts as evident, and the clinical opinion of the practitioner. The medical note is intended as peer to peer communication and may appear blunt or direct. It is written in medical language and may contain abbreviations or verbiage that are unfamiliar.     If you have any questions or concerns please call [facility] Infectious Disease at [phone].     Bobby Delarosa, APRN    5/19/2025  8:29 AM     no

## 2025-05-19 NOTE — PHYSICAL THERAPY NOTE
PHYSICAL THERAPY TREATMENT NOTE - INPATIENT    Room Number: 7600/7600-A     Session: 1     Number of Visits to Meet Established Goals: 5  History related to current admission: Patient is a 75 year old female admitted on 5/15/2025: Presents with inc weakness, dizziness and falls in the setting of L frontal crani for brain mets/tumor resection 4/16/25. Dx concern for subdural empyema near surgical cavity. Additionally with dyspnea and concern for PNA     [facility] 5/14-5/15/25: SOB  ED 5/12: fall   4/10-4/20/25: L frontal lobe mass with vasogenic edema s/p /p left image guided craniotaomy for brain mass on 4/16 > [facility]     HOME SITUATION  Type of Home: Condo  Home Layout: One level, Elevator                     Lives With: Spouse    Drives: Yes   Patient Regularly Uses: None       Prior Level of [place]: pt/spouse report multiple falls at home since previous admit, reports compliance with use of RW, states sometimes her legs give out or she trips  seemingly more with the LLE than the R     Presenting Problem: falls  Co-Morbidities : DL, HTN, CKD III, MDD, GERD, 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  Pt with mets to the lungs s/p bronch, R VATS with RML lobectomy on 1/24/2024.    PHYSICAL THERAPY ASSESSMENT   Patient demonstrates good  progress this session, goals  updated to reflect patient performance.      Patient is requiring supervision as a result of the following impairments: decreased endurance/aerobic capacity, decreased muscular endurance, and medical status.     Patient continues to function below baseline with gait.  Next session anticipate patient to progress gait.  Physical Therapy will continue to follow patient for duration of hospitalization.    Patient continues to benefit from continued skilled PT services: .    PLAN DURING HOSPITALIZATION  Nursing Mobility Recommendation : 1 Assist  PT  Device Recommendation: Rolling walker  PT Treatment Plan: Bed mobility, Body mechanics, Coordination, Endurance, Energy conservation, Patient education, Family education, Gait training, Range of motion, Neuromuscular re-educate, Strengthening, Transfer training, Balance training  Frequency (Obs): 3-5x/week     CURRENT GOALS     Goal #1 Patient is able to demonstrate supine - sit EOB @ level: supervision      Goal #2 Patient is able to demonstrate transfers EOB to/from Chair/Wheelchair at assistance level: supervision      Goal #3 Patient is able to ambulate 150 feet with assist device: walker - rolling at assistance level: supervision   MET  > progress to 300   Goal #4     Goal #5     Goal #6     Goal Comments: Goals established on 2025 all goals ongoing     SUBJECTIVE  \" I feel terrible\" - pt reports very fatigued     OBJECTIVE  Precautions: Bed/chair alarm (urostomy)    WEIGHT BEARING RESTRICTION     PAIN ASSESSMENT   Ratin  Location: pt denies       BALANCE                                                                                                                       Static Sitting: Good  Dynamic Sitting: Good           Static Standing: Fair -  Dynamic Standing: Fair -    ACTIVITY TOLERANCE        Resp:  (up to 45 wtih activity)                O2 WALK  Oxygen Therapy  SPO2% on Room Air at Rest:  (87-90)    AM-PAC '6-Clicks' INPATIENT SHORT FORM - BASIC MOBILITY  How much difficulty does the patient currently have...  Patient Difficulty: Turning over in bed (including adjusting bedclothes, sheets and blankets)?: None   Patient Difficulty: Sitting down on and standing up from a chair with arms (e.g., wheelchair, bedside commode, etc.): A Little   Patient Difficulty: Moving from lying on back to sitting on the side of the bed?: None   How much help from another person does the patient currently need...   Help from Another: Moving to and from a bed to a chair (including a wheelchair)?: A  Little   Help from Another: Need to walk in hospital room?: A Little   Help from Another: Climbing 3-5 steps with a railing?: A Little     AM-PAC Score:  Raw Score: 20   Approx Degree of Impairment: 35.83%   Standardized Score (AM-PAC Scale): 47.67   CMS Modifier (G-Code): CJ    FUNCTIONAL ABILITY STATUS  Gait Assessment   Functional Mobility/Gait Assessment  Gait Assistance: Supervision  Distance (ft): 150  Assistive Device: Rolling walker  Pattern: Within Functional Limits    Skilled Therapy Provided    Bed Mobility:  Rolling: mod I    Supine<>Sit: SBA, inc time   Sit<>Supine: mod I     Transfer Mobility:  Sit<>Stand: SBA - vc for BUE on bed to push off for leverage   Stand<>Sit: SBA   Gait: SBA - cued for pursed lip breathing, pt with difficulty applying, taking quick shallow breaths through her mouth. Dyspnea consistent with activity. Unable to obtain O2 pleth while ambulating, placed on 2L during gait however symptoms remained consistent.     Therapist's Comments: Discussed case with RN prior to session initiation. Pt agreeable to participation in therapy. Gait belt donned for out of bed mobility.  Spouse present throughout session.     RR 45 with activity, 30s at rest  HR 80s-90s  /67 sitting EOB  O2 on RA at rest 87-90      Patient End of Session: In bed, Needs met, Call light within reach, RN aware of session/findings, All patient questions and concerns addressed, Hospital anti-slip socks, Alarm set, Family present    PT Session Time: 23 minutes  Gait Trainin minutes  Therapeutic Activity:  minutes  Therapeutic Exercise:  minutes   Neuromuscular Re-education:  minutes

## 2025-05-19 NOTE — PROGRESS NOTES
OT order received and chart reviewed, pt declined therapy at this time due to fatigue. Will follow up as able and appropriate.

## 2025-05-19 NOTE — PLAN OF CARE
Assumed care at 0730  A/O x4   RA resting. O2 needed/ ambulation. SW aware  Denies pain   Meds given for constipation   Pt worked with PT  Plan for PO abx on dc  Call light within reach. Fall precautions in place   Pt and family updated on POC. All questions answered

## 2025-05-20 ENCOUNTER — APPOINTMENT (OUTPATIENT)
Dept: GENERAL RADIOLOGY | Facility: HOSPITAL | Age: 75
End: 2025-05-20
Attending: INTERNAL MEDICINE
Payer: MEDICARE

## 2025-05-20 LAB
ADENOVIRUS PCR:: NOT DETECTED
ANION GAP SERPL CALC-SCNC: 7 MMOL/L (ref 0–18)
B PARAPERT DNA SPEC QL NAA+PROBE: NOT DETECTED
B PERT DNA SPEC QL NAA+PROBE: NOT DETECTED
BASOPHILS # BLD: 0 X10(3) UL (ref 0–0.2)
BASOPHILS NFR BLD: 0 %
BUN BLD-MCNC: 40 MG/DL (ref 9–23)
C PNEUM DNA SPEC QL NAA+PROBE: NOT DETECTED
CALCIUM BLD-MCNC: 8.6 MG/DL (ref 8.7–10.6)
CHLORIDE SERPL-SCNC: 106 MMOL/L (ref 98–112)
CO2 SERPL-SCNC: 21 MMOL/L (ref 21–32)
CORONAVIRUS 229E PCR:: NOT DETECTED
CORONAVIRUS HKU1 PCR:: NOT DETECTED
CORONAVIRUS NL63 PCR:: NOT DETECTED
CORONAVIRUS OC43 PCR:: NOT DETECTED
CREAT BLD-MCNC: 1.06 MG/DL (ref 0.55–1.02)
CREAT BLD-MCNC: 1.12 MG/DL (ref 0.55–1.02)
EGFRCR SERPLBLD CKD-EPI 2021: 51 ML/MIN/1.73M2 (ref 60–?)
EGFRCR SERPLBLD CKD-EPI 2021: 54 ML/MIN/1.73M2 (ref 60–?)
EOSINOPHIL # BLD: 0.18 X10(3) UL (ref 0–0.7)
EOSINOPHIL NFR BLD: 2 %
ERYTHROCYTE [DISTWIDTH] IN BLOOD BY AUTOMATED COUNT: 16.1 %
FLUAV RNA SPEC QL NAA+PROBE: NOT DETECTED
FLUBV RNA SPEC QL NAA+PROBE: NOT DETECTED
GLUCOSE BLD-MCNC: 154 MG/DL (ref 70–99)
HCT VFR BLD AUTO: 30.2 % (ref 35–48)
HGB BLD-MCNC: 10.5 G/DL (ref 12–16)
LYMPHOCYTES NFR BLD: 0.36 X10(3) UL (ref 1–4)
LYMPHOCYTES NFR BLD: 4 %
MCH RBC QN AUTO: 33.7 PG (ref 26–34)
MCHC RBC AUTO-ENTMCNC: 34.8 G/DL (ref 31–37)
MCV RBC AUTO: 96.8 FL (ref 80–100)
METAMYELOCYTES # BLD: 0.54 X10(3) UL (ref ?–0.01)
METAMYELOCYTES NFR BLD: 6 %
METAPNEUMOVIRUS PCR:: NOT DETECTED
MONOCYTES # BLD: 0.09 X10(3) UL (ref 0.1–1)
MONOCYTES NFR BLD: 1 %
MORPHOLOGY: NORMAL
MYCOPLASMA PNEUMONIA PCR:: NOT DETECTED
NEUTROPHILS # BLD AUTO: 6.61 X10 (3) UL (ref 1.5–7.7)
NEUTROPHILS NFR BLD: 82 %
NEUTS BAND NFR BLD: 5 %
NEUTS HYPERSEG # BLD: 7.83 X10(3) UL (ref 1.5–7.7)
NRBC BLD MANUAL-RTO: 2 % (ref ?–1)
OSMOLALITY SERPL CALC.SUM OF ELEC: 291 MOSM/KG (ref 275–295)
PARAINFLUENZA 1 PCR:: NOT DETECTED
PARAINFLUENZA 2 PCR:: NOT DETECTED
PARAINFLUENZA 3 PCR:: NOT DETECTED
PARAINFLUENZA 4 PCR:: NOT DETECTED
PLATELET # BLD AUTO: 116 10(3)UL (ref 150–450)
PLATELET MORPHOLOGY: NORMAL
POTASSIUM SERPL-SCNC: 4.5 MMOL/L (ref 3.5–5.1)
RBC # BLD AUTO: 3.12 X10(6)UL (ref 3.8–5.3)
RHINOVIRUS/ENTERO PCR:: NOT DETECTED
RSV RNA SPEC QL NAA+PROBE: NOT DETECTED
SARS-COV-2 RNA NPH QL NAA+NON-PROBE: NOT DETECTED
SODIUM SERPL-SCNC: 134 MMOL/L (ref 136–145)
TOTAL CELLS COUNTED BLD: 100
WBC # BLD AUTO: 9 X10(3) UL (ref 4–11)

## 2025-05-20 PROCEDURE — 94760 N-INVAS EAR/PLS OXIMETRY 1: CPT

## 2025-05-20 PROCEDURE — 71045 X-RAY EXAM CHEST 1 VIEW: CPT | Performed by: INTERNAL MEDICINE

## 2025-05-20 PROCEDURE — 82565 ASSAY OF CREATININE: CPT | Performed by: STUDENT IN AN ORGANIZED HEALTH CARE EDUCATION/TRAINING PROGRAM

## 2025-05-20 PROCEDURE — 76942 ECHO GUIDE FOR BIOPSY: CPT

## 2025-05-20 PROCEDURE — 97530 THERAPEUTIC ACTIVITIES: CPT

## 2025-05-20 PROCEDURE — 85007 BL SMEAR W/DIFF WBC COUNT: CPT | Performed by: INTERNAL MEDICINE

## 2025-05-20 PROCEDURE — 85027 COMPLETE CBC AUTOMATED: CPT | Performed by: INTERNAL MEDICINE

## 2025-05-20 PROCEDURE — 87040 BLOOD CULTURE FOR BACTERIA: CPT | Performed by: INTERNAL MEDICINE

## 2025-05-20 PROCEDURE — 80048 BASIC METABOLIC PNL TOTAL CA: CPT | Performed by: INTERNAL MEDICINE

## 2025-05-20 PROCEDURE — 97165 OT EVAL LOW COMPLEX 30 MIN: CPT

## 2025-05-20 PROCEDURE — 85025 COMPLETE CBC W/AUTO DIFF WBC: CPT | Performed by: INTERNAL MEDICINE

## 2025-05-20 PROCEDURE — 0202U NFCT DS 22 TRGT SARS-COV-2: CPT | Performed by: INTERNAL MEDICINE

## 2025-05-20 RX ORDER — VANCOMYCIN HYDROCHLORIDE
25 ONCE
Status: COMPLETED | OUTPATIENT
Start: 2025-05-20 | End: 2025-05-20

## 2025-05-20 RX ORDER — SODIUM CHLORIDE 9 MG/ML
INJECTION, SOLUTION INTRAVENOUS ONCE
Status: COMPLETED | OUTPATIENT
Start: 2025-05-20 | End: 2025-05-20

## 2025-05-20 RX ORDER — LEVOFLOXACIN 750 MG/1
750 TABLET, FILM COATED ORAL ONCE
Status: DISCONTINUED | OUTPATIENT
Start: 2025-05-20 | End: 2025-05-20

## 2025-05-20 NOTE — PROGRESS NOTES
Infectious Disease Progress Note      Date of admission: 5/15/2025  7:11 PM     Reason for consult: Pneumonia    Referring physician: Kayleigh Mills MD    Subjective: Feels well.  No headaches.  No nausea vomiting.  No diarrhea.  Had 1 episode of fever last night; however, was asymptomatic with that.    The rest of the systems were reviewed and found to be negative except was mentioned above    Interval events: This is a 75-year-old female patient, recently had a craniotomy on 4/17/2025, now presents here with pneumonia.  Currently on IV cefepime with clinical improvement.  Lost IV access.  Had 1 fever last night.    Medications:  Current Hospital Medications[1]     Allergies:  Allergies[2]    Physical Exam:  Vitals:    05/20/25 1056   BP: 114/51   Pulse: 105   Resp: (!) 42   Temp: 98.2 °F (36.8 °C)     Vitals signs and nursing note reviewed.   Constitutional:       Appearance: Normal appearance.   HENT:      Head: Normocephalic and atraumatic.      Mouth: Mucous membranes are moist.   Neck:      Musculoskeletal: Neck supple.   Cardiovascular:      Rate and Rhythm: Normal rate.      Heart sounds: Normal heart sounds. No murmur. No friction rub. No gallop.    Pulmonary:      Effort: Pulmonary effort is normal. No respiratory distress.      Breath sounds: Normal breath sounds. No stridor. No wheezing, rhonchi or rales.   Chest:      Chest wall: No tenderness.   Abdominal:      General: Abdomen is flat. There is no distension.      Palpations: Abdomen is soft. There is no mass.      Tenderness: There is no tenderness. There is no guarding or rebound.      Hernia: No hernia is present.   Musculoskeletal:      Right lower leg: No edema.      Left lower leg: No edema.   Skin:     General: Skin is warm and dry.   Neurological:      General: No focal deficit present.      Mental Status: Alert and oriented to person, place, and time.       Laboratory data:  I have reviewed all the lab results independently.  Lab Results    Component Value Date    CREATSERUM 1.06 05/20/2025      Recent Labs   Lab 05/16/25  0546   RBC 3.21*   HGB 10.8*   HCT 31.0*   MCV 96.6   MCH 33.6   MCHC 34.8   RDW 15.3   NEPRELIM 5.94   WBC 6.8   .0*   NEUT 82   LYMPH 8   MON 0   EOS 1      Microbiology data:  No results found for this visit on 05/15/25.     Radiology:  I have reviewed all imagining data available independently.   MRI of the brain on 5/15/2025:  1.  Left frontal craniotomy changes with encephalomalacia.  Surgical resection cavity in the anterior left frontal lobe with hemosiderin staining/chronic blood products.  Overlying small subdural collection with associated restricted diffusion and enhancement concerning for subdural empyema.  Clinical correlation is advised.  There is adjacent dural thickening and enhancement.  Consider neurosurgical consultation.     2.  Minimal enhancement along the posterior margin of the left frontal lobe surgical resection cavity, nonspecific finding.  No other areas of pathologic enhancement are noted.     3.  No acute infarct, hydrocephalus, or midline shift.  Chronic microvascular ischemic changes bilaterally.      CT chest on 5/14/2025:  1.  Negative exam for central and proximal segmental pulmonary emboli.     2.  Mild bibasilar patchy airspace opacities likely represent infectious/inflammatory process in the appropriate clinical setting. Follow-up to resolution is recommended.     Impression:  Sujey Medina is a 75 year old female with    Bilateral pneumonia  MRSA screen was negative  Urine strep pneumo and Legionella antigens were negative  RVP at [facility] was also negative  Currently on IV cefepime  Had 1 episode of fever last night; however, the patient was sleeping and completely asymptomatic.  Doubtful it was a true fever  Will continue to monitor for now  Question of subdural empyema  Seen by neurosurgery, ruled out at this point.    Recommendations:    Discontinue cefepime  Will  give the patient 1 dose of PO Levaquin 750 mg x 1, that will finish 7 days of therapy for pneumonia  Continue to monitor the patient over the next 24 hours and as long as she remains afebrile, okay to discharge from ID perspective tomorrow  Continue to monitor daily labs for antibiotic toxicities  Further recommendations will depend on the above work-up and clinical progress     Addendum:  Received a call from the nurse at 12 noon that the patient just spiked a fever and was having chills.  Ordered repeat 2 sets of blood cultures, repeat chest x-ray, RVP  Escalated the patient to IV meropenem and IV vancomycin in the meantime    The plan of care was discussed with the primary hospital team, Kayleigh Mills MD     Recommendations were also discussed with the patient; all questions were answered.     Thank you for this consultation. Please don't hesitate to call the ID team for questions or any acute changes in patient's clinical condition.    Please note that this report has been produced using speech recognition software and may contain errors related to that system including, but not limited to, errors in grammar, punctuation, and spelling, as well as words and phrases that possibly may have been recognized inappropriately.  If there are any questions or concerns, contact the dictating provider for clarification.    The 21st Century Cures Act makes medical notes like these available to patients in the interest of transparency. Please be advised this is a medical document. Medical documents are intended to carry relevant information, facts as evident, and the clinical opinion of the practitioner. The medical note is intended as peer to peer communication and may appear blunt or direct. It is written in medical language and may contain abbreviations or verbiage that are unfamiliar.     Tommy Carlton MD  [facility]ious [facility]. Tel: [phone]. Fax: [phone].     Sujey ERAZO Adam : 3/6/1950 MRN:  [id number] [facility]: [id number]          [1]   levoFLOXacin    polyethylene glycol (PEG 3350)    buPROPion SR    busPIRone    famotidine    metoprolol tartrate    atorvastatin    melatonin    guaiFENesin    albuterol    heparin    acetaminophen    [COMPLETED] dexamethasone **FOLLOWED BY** dexamethasone    levetiracetam  [2] No Known Allergies

## 2025-05-20 NOTE — PLAN OF CARE
Assumed care at 1930,  Patient is alert and oriented X 4  On 2L of O2, SR on tele  Denies any pain or discomfort at this time  Patient is febrile at 0000  Rechecked at 0100 am, temp was at 97.8  IV ABX maintained  Call light within reach, questions answered, all needs met  POC discussed with family.

## 2025-05-20 NOTE — OCCUPATIONAL THERAPY NOTE
OCCUPATIONAL THERAPY EVALUATION - INPATIENT     Room Number: 7600/7600-A  Evaluation Date: 5/20/2025  Type of Evaluation: Initial  Presenting Problem: Frequent falls    Physician Order: IP Consult to Occupational Therapy  Reason for Therapy: ADL/IADL Dysfunction and Discharge Planning    OCCUPATIONAL THERAPY ASSESSMENT   Patient is currently functioning near baseline with toileting, bathing, lower body dressing, bed mobility, transfers, and functional standing tolerance. Prior to admission, patient's baseline is mod independent.  Patient is requiring minimum assistance as a result of the following impairments: decreased functional strength, decreased functional reach, decreased endurance, decreased muscular endurance, medical status, decreased compliance/participation, and increased O2 needs from baseline. Occupational Therapy will continue to follow for duration of hospitalization.    Patient will benefit from continued skilled OT Services    History Related to Current Admission: Patient is a 75 year old female admitted on 5/15/2025 with Presenting Problem: Frequent falls. 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  Pt with mets to the lungs s/p bronch, R VATS with RML lobectomy on 1/24/2024.    WEIGHT BEARING RESTRICTION       Recommendations for nursing staff:   Transfers: x1 with walker  Toileting location: commode    EVALUATION SESSION:  Patient Start of Session: supine bed    FUNCTIONAL TRANSFER ASSESSMENT  Sit to Stand: Edge of Bed  Edge of Bed: Minimal Assist  Commode Transfer: Contact Guard Assist    BED MOBILITY  Supine to Sit : Minimal Assist    BALANCE ASSESSMENT     FUNCTIONAL ADL ASSESSMENT  LB Dressing Seated: Moderate Assist  Toileting Standing: Minimal Assist    ACTIVITY TOLERANCE: limited due to SOB, fatigue                         O2  SATURATIONS       COGNITION  Overall Cognitive Status:  Impaired  Arousal/Alertness:  delayed responses to stimuli  Initiation: cues to initiate tasks  Problem Solving:  assistance required to generate solutions and assistance required to implement solutions    Upper Extremity   ROM: within functional limits   Strength: within functional limits   Coordination  Gross motor:   Fine motor:   Sensation:     EDUCATION PROVIDED  Patient Education : Role of Occupational Therapy; Plan of Care; Discharge Recommendations; DME Recommendations; Functional Transfer Techniques; Fall Prevention; Posture/Positioning; Proper Body Mechanics; Energy Conservation  Patient's Response to Education: Requires Further Education    Equipment used: walker  Demonstrates functional use, Would benefit from additional trial      Therapist comments: Received pt supine in bed, Roverto bed mobility. Poor initiation of tasks requiring verbal cues to begin. Displays good static sitting balance, CGA sit to stand tx with walker and cues for hand placement. Pt reports dizziness and SOB upon standing, says needs to lay down. Therapist educated pt on need to be out of bed, pt agreeable to get to chair. Pt continues to demo shallow breathing during tx, does not breathe in through nose even when cued. O2 ~89-91% on RA during tx.     Patient End of Session: Up in chair, Needs met, Call light within reach, RN aware of session/findings, All patient questions and concerns addressed, Hospital anti-slip socks, Alarm set    OCCUPATIONAL PROFILE    HOME SITUATION  Type of Home: Condo  Home Layout: One level, Elevator  Lives With: Spouse    Toilet and Equipment: Standard height toilet  Shower/Tub and Equipment: Tub-shower combo             Drives: Yes  Patient Regularly Uses: None    Prior Level of Function: mod indep, frequent falls at home    SUBJECTIVE   \"I'm breathing real heavy\"     PAIN ASSESSMENT  Ratin          OBJECTIVE  Precautions: Bed/chair alarm  Fall  Risk: Standard fall risk    ASSESSMENTS    AM-PAC ‘6-Clicks’ Inpatient Daily Activity Short Form  -   Putting on and taking off regular lower body clothing?: A Lot  -   Bathing (including washing, rinsing, drying)?: A Lot  -   Toileting, which includes using toilet, bedpan or urinal? : A Little  -   Putting on and taking off regular upper body clothing?: A Little  -   Taking care of personal grooming such as brushing teeth?: A Little  -   Eating meals?: None    AM-PAC Score:  Score: 17  Approx Degree of Impairment: 50.11%  Standardized Score (AM-PAC Scale): 37.26    ADDITIONAL TESTS     NEUROLOGICAL FINDINGS      COGNITION ASSESSMENTS     PLAN  OT Device Recommendations: Shower chair  OT Treatment Plan: Balance activities, ADL training, UE strengthening/ROM, Functional transfer training, Patient/Family training, Patient/Family education, Compensatory technique education  Rehab Potential : Fair  Frequency: 3-5x/week  Number of Visits to Meet Established Goals: 7    ADL Goals   Patient will perform lower body dressing:  with supervision  Patient will perform toileting: with supervision    Functional Transfer Goals  Patient will transfer to toilet:  with supervision    Patient Evaluation Complexity Level:   Occupational Profile/Medical History LOW - Brief history including review of medical or therapy records    Specific performance deficits impacting engagement in ADL/IADL LOW  1 - 3 performance deficits    Client Assessment/Performance Deficits LOW - No comorbidities nor modifications of tasks    Clinical Decision Making LOW - Analysis of occupational profile, problem-focused assessments, limited treatment options    Overall Complexity LOW     OT Session Time: 20 minutes  Self-Care Home Management:  minutes  Therapeutic Activity: 10 minutes  Neuromuscular Re-education:  minutes  Therapeutic Exercise:  minutes  Cognitive Skills: minutes  Sensory Integrative:  minutes  Orthotic Management and Training:  minutes  Can  add/delete any of these

## 2025-05-20 NOTE — PROGRESS NOTES
[facility]  Neurosurgery Progress Note  2025    Sujey Medina Patient Status:  Inpatient    3/6/1950 MRN [id number]   Location [facility] 7NE-A Attending Kayleigh Mills MD   Hosp Day # 5 PCP Rajni Tiwari MD     SUBJECTIVE:  Sujey Medina is a(n) 75 year old female status post craniotomy for metastatic disease  She is doing well  Denies any issues      OBJECTIVE / PHYSICAL EXAM:  Vital Signs:  Blood pressure 103/56, pulse 86, temperature 98.3 °F (36.8 °C), temperature source Oral, resp. rate (!) 32, weight 139 lb 15.9 oz (63.5 kg), SpO2 93%, not currently breastfeeding.  Sleeping  Arouses  Follows commands x 4  Oriented x 3      Lab Results (last 24 hours):  Recent Results (from the past 24 hours)   Creatinine, Serum    Collection Time: 25  5:32 AM   Result Value Ref Range    Creatinine 1.06 (H) 0.55 - 1.02 mg/dL    eGFR-Cr 54 (L) >=60 mL/min/1.73m2       Assessment/Plan:  75-year-old female status post craniotomy for tumor  No signs of CNS infection  She is doing great  Okay to discharge from neurosurgical standpoint  Follow-up in a couple weeks      Candido Daigle MD   [facility] [place]  2025  8:41 AM  Dictated but not proofread

## 2025-05-20 NOTE — PROGRESS NOTES
[facility] Pharmacy Dosing Service      Initial Pharmacokinetic Consult for Vancomycin Dosing     Sujey Medina is a 75 year old female with a history of a recent craniotomy on 25.  The patient had been on Cefepime, Vanco and Flagyl at [facility].  Admitted at [place] with pneumonia 5/15.  The patient now presents with fever and is being initiated on vancomycin therapy for sepsis.  Pharmacy has been asked to dose vancomycin by Dr. Carlton.  The initial treatment and monitoring approach will be steady state AUC strategy.        Weight and Temperature:    Wt Readings from Last 1 Encounters:   05/15/25 63.5 kg (139 lb 15.9 oz)        Temp Readings from Last 1 Encounters:   25 (!) 101 °F (38.3 °C) (Axillary)      Labs:   Recent Labs   Lab 25  0714 25  0607 25  0532   CREATSERUM 1.01 1.11* 1.06*      Estimated Creatinine Clearance: 40.6 mL/min (A) (based on SCr of 1.06 mg/dL (H)).     Recent Labs   Lab 25  0546   WBC 6.8          The Pharmacokinetic Target is:     to 600 mg-h/L and trough <=15 mg/L    Renal Dosing Considerations:    None     Assessment/Plan:   Initial/Loading dose: Will receive 1500 mg IV (25 mg/kg, capped at 2250 mg) x 1 initial dose.      Maintenance dose: Pharmacy will dose vancomycin at 1000 mg IV every 24 hours    Monitorin) Plan for vancomycin peak and trough to be obtained at steady state    2) Pharmacy will order SCr as clinically indicated to assess renal function.    3) Pharmacy will monitor for toxicity and efficacy, adjust vancomycin dose and/or frequency, and order vancomycin levels as appropriate per the Pharmacy and Therapeutics Committee approved protocol until discontinuation of the medication.       We appreciate the opportunity to assist in the care of this patient.     Lila Barnes, PharmD  2025  12:23 PM  [facility] Pharmacy Extension: [phone]

## 2025-05-20 NOTE — PROGRESS NOTES
[facility]   part of [facility] Hospitalist Progress Note     Sujey Medina Patient Status:  Inpatient    3/6/1950 MRN [id number]   Location [facility] 7NE-A Attending Kayleigh Mills MD   Hosp Day # 5 PCP Rajni Tiwari MD     Subjective:     Patient seen and examined.   More awake and alert this am  No issues overnight  Says she feels lousy this am   at bedside, very angry and upset at home dc plan   states she is not fit enough to go home  afebrile    Objective:    Review of Systems:   10 point ROS completed and was negative, except for pertinent positive and negatives stated in subjective.    Vital signs:  Temp:  [97.6 °F (36.4 °C)-100.8 °F (38.2 °C)] 98.3 °F (36.8 °C)  Pulse:  [70-98] 86  Resp:  [20-41] 32  BP: ()/(56-70) 103/56  SpO2:  [87 %-95 %] 93 %  HEENT: craniotomy scar visible, clean dry intact, staple.  Neck: No lymphadenopathy. No JVD. No carotid bruits.  Respiratory: Clear to auscultation bilaterally. No wheezes. No rhonchi.  Cardiovascular: S1, S2. Regular rate and rhythm. No murmurs, rubs or gallops. Equal pulses.   Chest and Back: No tenderness or deformity.  Abdomen: Soft, nontender, nondistended.  Positive bowel sounds. No rebound, guarding or organomegaly.  Neurologic: No focal neurological deficits. CNII-XII grossly intact.  Musculoskeletal: Moves all extremities.  Extremities: No edema or cyanosis.  Integument: No rashes or lesions.   Psychiatric: Appropriate mood and affect.      Diagnostic Data:    Labs:  Recent Labs   Lab 25  0546   WBC 6.8   HGB 10.8*   MCV 96.6   .0*   BAND 9       Recent Labs   Lab 25  0546 25  1437 25  0556 25  0714 25  0607 25  0532   *  --   --   --   --   --    BUN 18  --   --   --   --   --    CREATSERUM 1.03*  1.03*  --    < > 1.01 1.11* 1.06*   CA 8.3*  --   --   --   --   --    *  --   --   --   --   --    K 3.6 3.9  --   --   --   --       --   --   --   --   --    CO2 17.0*  --   --   --   --   --     < > = values in this interval not displayed.       Estimated Creatinine Clearance: 40.6 mL/min (A) (based on SCr of 1.06 mg/dL (H)).    No results for input(s): \"PTP\", \"INR\" in the last 168 hours.         COVID-19 Lab Results    COVID-19  Lab Results   Component Value Date    COVID19 Not Detected 10/25/2022    COVID19 Not Detected 10/20/2022    COVID19 Not Detected 10/19/2022       Pro-Calcitonin  No results for input(s): \"PCT\" in the last 168 hours.    Cardiac  No results for input(s): \"TROP\", \"PBNP\" in the last 168 hours.    Creatinine Kinase  No results for input(s): \"CK\" in the last 168 hours.    Inflammatory Markers  Recent Labs   Lab 05/16/25  0546   CRP 30.20*       Imaging: Imaging data reviewed in Epic.    Medications: Scheduled Medications[1]    Assessment & Plan:     Sujey Medina is a 75 year old female with PMH sig for DL, HTN, CKD III, MDD, GERD, bladder ca stage iv w/ mets to brain sp craniotomy and frontal tumor resection presents to ED at [facility] w/ weakness, sob,  and falls.     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 Daigle  - Follows with neurosurgery, oncology and radiation oncology outpatient  - On keppra PTA >> continue  - Neurosurgery consulted >> seen by Dr Daigle >> no neurosurgical intervention indicated   - MRI brain w/ and w/o contrast to be obtained from [facility] PACS or may consider repeating here if neuro status worsens  -dw dr bourgeois at bedside, no further  work up now, and no surgical intervention needed at this time, fu in 2 wks  - trend fever  - cont iv abx as ordered  - ID following >> apprec recs  -PT/OT    Recurrent Falls  Generalized Weakness  Dizziness  - Suspect related to above   - No obvious focal neurological deficits  - CTH with no acute findings  - Check keppra level  - PT/OT assessment in progress >> will dw  regarding care at home vs rehab    HTN  DL  -stable  -resume hoome meds as able     GERD  -PPI     MDD  -resume home meds        Quality:  DVT Prophylaxis: lovenox, scds  CODE status: full code  If COVID testing is negative, may discontinue isolation: yes      DISPO:  Dc planning in process  Cont inpt for now  Re-assessing DC plan >>  states pt would not be strong enough to go home  He is interested in [facility] options  Dw with KELLY Nunez to help with DC plan  ID following, pt needs to be fever free x 24 hours prior to dc        Cont abx as ordered  NSGY - following   ID following     Plan of care discussed with patient and , all questions answered.           Kayleigh Mills MD  [facility] Hospitalist  Pager [phone]  Answering Service number: [phone]                  [1]    cefepime  1 g Intravenous q12h    buPROPion SR  200 mg Oral Daily    busPIRone  5 mg Oral BID    famotidine  20 mg Oral Daily    metoprolol tartrate  25 mg Oral BID    atorvastatin  20 mg Oral Nightly    melatonin  3 mg Oral Nightly    heparin  5,000 Units Subcutaneous 2 times per day    dexamethasone  2 mg Oral BID    levetiracetam  1,000 mg Oral BID      No

## 2025-05-21 LAB
ANION GAP SERPL CALC-SCNC: 9 MMOL/L (ref 0–18)
BASOPHILS # BLD: 0 X10(3) UL (ref 0–0.2)
BASOPHILS NFR BLD: 0 %
BUN BLD-MCNC: 33 MG/DL (ref 9–23)
CALCIUM BLD-MCNC: 8.5 MG/DL (ref 8.7–10.6)
CHLORIDE SERPL-SCNC: 107 MMOL/L (ref 98–112)
CO2 SERPL-SCNC: 19 MMOL/L (ref 21–32)
CREAT BLD-MCNC: 0.91 MG/DL (ref 0.55–1.02)
CREAT BLD-MCNC: 0.91 MG/DL (ref 0.55–1.02)
EGFRCR SERPLBLD CKD-EPI 2021: 65 ML/MIN/1.73M2 (ref 60–?)
EGFRCR SERPLBLD CKD-EPI 2021: 65 ML/MIN/1.73M2 (ref 60–?)
EOSINOPHIL # BLD: 0.18 X10(3) UL (ref 0–0.7)
EOSINOPHIL NFR BLD: 2 %
ERYTHROCYTE [DISTWIDTH] IN BLOOD BY AUTOMATED COUNT: 15.9 %
GLUCOSE BLD-MCNC: 136 MG/DL (ref 70–99)
HCT VFR BLD AUTO: 30.5 % (ref 35–48)
HGB BLD-MCNC: 10.5 G/DL (ref 12–16)
LYMPHOCYTES NFR BLD: 0.44 X10(3) UL (ref 1–4)
LYMPHOCYTES NFR BLD: 5 %
MCH RBC QN AUTO: 33.4 PG (ref 26–34)
MCHC RBC AUTO-ENTMCNC: 34.4 G/DL (ref 31–37)
MCV RBC AUTO: 97.1 FL (ref 80–100)
METAMYELOCYTES # BLD: 0.18 X10(3) UL (ref ?–0.01)
METAMYELOCYTES NFR BLD: 2 %
MONOCYTES # BLD: 0.09 X10(3) UL (ref 0.1–1)
MONOCYTES NFR BLD: 1 %
MORPHOLOGY: NORMAL
MYELOCYTES # BLD: 0.26 X10(3) UL (ref ?–0.01)
MYELOCYTES NFR BLD: 3 %
NEUTROPHILS # BLD AUTO: 6.64 X10 (3) UL (ref 1.5–7.7)
NEUTROPHILS NFR BLD: 73 %
NEUTS BAND NFR BLD: 14 %
NEUTS HYPERSEG # BLD: 7.66 X10(3) UL (ref 1.5–7.7)
OSMOLALITY SERPL CALC.SUM OF ELEC: 289 MOSM/KG (ref 275–295)
PLATELET # BLD AUTO: 117 10(3)UL (ref 150–450)
PLATELET MORPHOLOGY: NORMAL
POTASSIUM SERPL-SCNC: 4.5 MMOL/L (ref 3.5–5.1)
RBC # BLD AUTO: 3.14 X10(6)UL (ref 3.8–5.3)
SODIUM SERPL-SCNC: 135 MMOL/L (ref 136–145)
TOTAL CELLS COUNTED BLD: 100
WBC # BLD AUTO: 8.8 X10(3) UL (ref 4–11)

## 2025-05-21 PROCEDURE — 80048 BASIC METABOLIC PNL TOTAL CA: CPT | Performed by: INTERNAL MEDICINE

## 2025-05-21 PROCEDURE — 94760 N-INVAS EAR/PLS OXIMETRY 1: CPT

## 2025-05-21 PROCEDURE — 85025 COMPLETE CBC W/AUTO DIFF WBC: CPT | Performed by: INTERNAL MEDICINE

## 2025-05-21 PROCEDURE — 85007 BL SMEAR W/DIFF WBC COUNT: CPT | Performed by: INTERNAL MEDICINE

## 2025-05-21 PROCEDURE — 85027 COMPLETE CBC AUTOMATED: CPT | Performed by: INTERNAL MEDICINE

## 2025-05-21 PROCEDURE — 82565 ASSAY OF CREATININE: CPT | Performed by: STUDENT IN AN ORGANIZED HEALTH CARE EDUCATION/TRAINING PROGRAM

## 2025-05-21 NOTE — PLAN OF CARE
Assumed care at 0730. Pt very tired, no appetite. Up to chair and was shivering. Temp went up to 102. O2 needs increased. Dr pearce notified and orders received and followed through. Received tylenol, new ivab, bolus, BC, CXR, resp panel. By this evening afebrile and breathing easier at only 1L per nc. Will cont to monitor.

## 2025-05-21 NOTE — PLAN OF CARE
Assumed patient care at 0730  Afebrile  IV antibiotics as scheduled  1L NC; tachypneic.  Encouraged IS  BM x1  Adequate UO via urostomy  PT to see tomorrow  Spouse at bedside

## 2025-05-21 NOTE — PROGRESS NOTES
[facility]  Neurosurgery Progress Note  2025    Sujey Medina Patient Status:  Inpatient    3/6/1950 MRN [id number]   Location [facility] 7NE-A Attending Kayleigh Mills MD   Hosp Day # 6 PCP Rajni Tiwari MD     SUBJECTIVE:  Sujey Medina is a(n) 75 year old female status post craniotomy for metastatic disease  Having more difficulty catching her breath this morning, febrile yesterday morning.       OBJECTIVE / PHYSICAL EXAM:  Vital Signs:  Blood pressure 100/60, pulse 72, temperature 97.7 °F (36.5 °C), temperature source Axillary, resp. rate (!) 33, weight 139 lb 15.9 oz (63.5 kg), SpO2 95%, not currently breastfeeding.  Awake, alert  Tachypneic, slightly diaphoretic  Oriented x 3  Follows commands x 4 with good strength      Lab Results (last 24 hours):  Recent Results (from the past 24 hours)   $$$$Respiratory Flu Expanded Panel + Covid-19$$$$    Collection Time: 25  1:23 PM    Specimen: Nasopharyngeal swab; Other   Result Value Ref Range    SARS-CoV-2 PCR: Not Detected Not Detected    Adenovirus PCR: Not Detected Not Detected    Coronavirus 229E PCR: Not Detected Not Detected    Coronavirus Hku1 PCR: Not Detected Not Detected    Coronavirus Nl63 PCR: Not Detected Not Detected    Coronavirus Oc43 PCR: Not Detected Not Detected    Metapneumovirus PCR: Not Detected Not Detected    Rhinovirus/Entero PCR: Not Detected Not Detected    Influenza A PCR: Not Detected Not Detected    Influenza B PCR: Not Detected Not Detected    Parainfluenza 1 PCR: Not Detected Not Detected    Parainfluenza 2 PCR Not Detected Not Detected    Parainfluenza 3 PCR Not Detected Not Detected    Parainfluenza 4 PCR Not Detected Not Detected    Resp Syncytial Virus PCR Not Detected Not Detected    Bordetella Pertussis PCR Not Detected Not Detected    Bordetella Parapertussis PCR Not Detected Not Detected    Chlamydia pneumonia PCR: Not Detected Not Detected    Mycoplasma pneumonia PCR: Not Detected Not Detected   Basic  Metabolic Panel (8)    Collection Time: 05/20/25  8:31 PM   Result Value Ref Range    Glucose 154 (H) 70 - 99 mg/dL    Sodium 134 (L) 136 - 145 mmol/L    Potassium 4.5 3.5 - 5.1 mmol/L    Chloride 106 98 - 112 mmol/L    CO2 21.0 21.0 - 32.0 mmol/L    Anion Gap 7 0 - 18 mmol/L    BUN 40 (H) 9 - 23 mg/dL    Creatinine 1.12 (H) 0.55 - 1.02 mg/dL    Calcium, Total 8.6 (L) 8.7 - 10.6 mg/dL    Calculated Osmolality 291 275 - 295 mOsm/kg    eGFR-Cr 51 (L) >=60 mL/min/1.73m2   CBC With Differential With Platelet    Collection Time: 05/20/25  8:33 PM   Result Value Ref Range    WBC 9.0 4.0 - 11.0 x10(3) uL    RBC 3.12 (L) 3.80 - 5.30 x10(6)uL    HGB 10.5 (L) 12.0 - 16.0 g/dL    HCT 30.2 (L) 35.0 - 48.0 %    .0 (L) 150.0 - 450.0 10(3)uL    MCV 96.8 80.0 - 100.0 fL    MCH 33.7 26.0 - 34.0 pg    MCHC 34.8 31.0 - 37.0 g/dL    RDW 16.1 %    Neutrophil Absolute Prelim 6.61 1.50 - 7.70 x10 (3) uL   Manual differential    Collection Time: 05/20/25  8:33 PM   Result Value Ref Range    Neutrophil Absolute Manual 7.83 (H) 1.50 - 7.70 x10(3) uL    Lymphocyte Absolute Manual 0.36 (L) 1.00 - 4.00 x10(3) uL    Monocyte Absolute Manual 0.09 (L) 0.10 - 1.00 x10(3) uL    Eosinophil Absolute Manual 0.18 0.00 - 0.70 x10(3) uL    Basophil Absolute Manual 0.00 0.00 - 0.20 x10(3) uL    Metamyelocyte Absolute Manual 0.54 (H) 0 x10(3) uL    Neutrophils % Manual 82 %    Band % 5 %    Lymphocyte % Manual 4 %    Monocyte % Manual 1 %    Eosinophil % Manual 2 %    Basophil % Manual 0 %    Metamyelocyte % 6 %    NRBC 2 (H) 0    Total Cells Counted 100     RBC Morphology Normal Normal, Slide reviewed, see previous RBC morphology.    Platelet Morphology Normal Normal   Creatinine, Serum    Collection Time: 05/21/25  5:15 AM   Result Value Ref Range    Creatinine 0.91 0.55 - 1.02 mg/dL    eGFR-Cr 65 >=60 mL/min/1.73m2   Basic Metabolic Panel (8)    Collection Time: 05/21/25  5:15 AM   Result Value Ref Range    Glucose 136 (H) 70 - 99 mg/dL    Sodium  135 (L) 136 - 145 mmol/L    Potassium 4.5 3.5 - 5.1 mmol/L    Chloride 107 98 - 112 mmol/L    CO2 19.0 (L) 21.0 - 32.0 mmol/L    Anion Gap 9 0 - 18 mmol/L    BUN 33 (H) 9 - 23 mg/dL    Creatinine 0.91 0.55 - 1.02 mg/dL    Calcium, Total 8.5 (L) 8.7 - 10.6 mg/dL    Calculated Osmolality 289 275 - 295 mOsm/kg    eGFR-Cr 65 >=60 mL/min/1.73m2   CBC With Differential With Platelet    Collection Time: 05/21/25  5:15 AM   Result Value Ref Range    WBC 8.8 4.0 - 11.0 x10(3) uL    RBC 3.14 (L) 3.80 - 5.30 x10(6)uL    HGB 10.5 (L) 12.0 - 16.0 g/dL    HCT 30.5 (L) 35.0 - 48.0 %    .0 (L) 150.0 - 450.0 10(3)uL    MCV 97.1 80.0 - 100.0 fL    MCH 33.4 26.0 - 34.0 pg    MCHC 34.4 31.0 - 37.0 g/dL    RDW 15.9 %    Neutrophil Absolute Prelim 6.64 1.50 - 7.70 x10 (3) uL   Manual differential    Collection Time: 05/21/25  5:15 AM   Result Value Ref Range    Neutrophil Absolute Manual 7.66 1.50 - 7.70 x10(3) uL    Lymphocyte Absolute Manual 0.44 (L) 1.00 - 4.00 x10(3) uL    Monocyte Absolute Manual 0.09 (L) 0.10 - 1.00 x10(3) uL    Eosinophil Absolute Manual 0.18 0.00 - 0.70 x10(3) uL    Basophil Absolute Manual 0.00 0.00 - 0.20 x10(3) uL    Metamyelocyte Absolute Manual 0.18 (H) 0 x10(3) uL    Myelocyte Absolute Manual 0.26 (H) 0 x10(3) uL    Neutrophils % Manual 73 %    Band % 14 %    Lymphocyte % Manual 5 %    Monocyte % Manual 1 %    Eosinophil % Manual 2 %    Basophil % Manual 0 %    Metamyelocyte % 2 %    Myelocyte % 3 %    Total Cells Counted 100     RBC Morphology Normal Normal, Slide reviewed, see previous RBC morphology.    Platelet Morphology Normal Normal       Assessment/Plan:  75-year-old female s/p Left frontal craniotomy for metastatic urothelial carcinoma resection (4/16/2025)   No signs of CNS infection  Recommend Hospitalist optimization for pneumonia  Follow up with Dr. Daigle in a couple weeks upon discharge    Patient seen with Dr. Oxana Sutton PA-C  [facility]  5/21/2025  9:46 AM   Patient seen examined with PA  Case discussed  She is doing well from a neurosurgical standpoint  Still having some issues breathing  ID and hospitalist on board  No acute nursing intervention  Following

## 2025-05-21 NOTE — PROGRESS NOTES
[facility]   part of [facility] Hospitalist Progress Note     Sujey Medina Patient Status:  Inpatient    3/6/1950 MRN [id number]   Location [facility] 7NE-A Attending Kayleigh Mills MD   Hosp Day # 6 PCP Rajni Tiwari MD     Subjective:     Patient seen and examined.   Felt more tired yest  Febrile with temps 102  Repeat blood cx drawn  Repeat labs show no elev in wbc  Cxr shows possible increase in consolidation  RR intermittently in the 30s  ID adjusted abx - switched to andrae and vanco    Objective:    Review of Systems:   10 point ROS completed and was negative, except for pertinent positive and negatives stated in subjective.    Vital signs:  Temp:  [97.6 °F (36.4 °C)-102.1 °F (38.9 °C)] 97.7 °F (36.5 °C)  Pulse:  [] 72  Resp:  [26-42] 33  BP: ()/(51-68) 100/60  SpO2:  [90 %-100 %] 95 %  HEENT: craniotomy scar visible, clean dry intact, staple.  Neck: No lymphadenopathy. No JVD. No carotid bruits.  Respiratory: Clear to auscultation bilaterally. No wheezes. No rhonchi.  Cardiovascular: S1, S2. Regular rate and rhythm. No murmurs, rubs or gallops. Equal pulses.   Chest and Back: No tenderness or deformity.  Abdomen: Soft, nontender, nondistended.  Positive bowel sounds. No rebound, guarding or organomegaly.  Neurologic: No focal neurological deficits. CNII-XII grossly intact.  Musculoskeletal: Moves all extremities.  Extremities: No edema or cyanosis.  Integument: No rashes or lesions.   Psychiatric: Appropriate mood and affect.      Diagnostic Data:    Labs:  Recent Labs   Lab 25  0546 25  0515   WBC 6.8 9.0 8.8   HGB 10.8* 10.5* 10.5*   MCV 96.6 96.8 97.1   .0* 116.0* 117.0*   BAND 9 5 14       Recent Labs   Lab 25  0546 25  1437 25  0556 25  0532 25  0515   *  --   --   --  154* 136*   BUN 18  --   --   --  40* 33*   CREATSERUM 1.03*  1.03*  --    < > 1.06* 1.12* 0.91   0.91   CA 8.3*  --   --   --  8.6* 8.5*   *  --   --   --  134* 135*   K 3.6 3.9  --   --  4.5 4.5     --   --   --  106 107   CO2 17.0*  --   --   --  21.0 19.0*    < > = values in this interval not displayed.       Estimated Creatinine Clearance: 47.3 mL/min (based on SCr of 0.91 mg/dL).    No results for input(s): \"PTP\", \"INR\" in the last 168 hours.         COVID-19 Lab Results    COVID-19  Lab Results   Component Value Date    COVID19 Not Detected 05/20/2025    COVID19 Not Detected 10/25/2022    COVID19 Not Detected 10/20/2022       Pro-Calcitonin  No results for input(s): \"PCT\" in the last 168 hours.    Cardiac  No results for input(s): \"TROP\", \"PBNP\" in the last 168 hours.    Creatinine Kinase  No results for input(s): \"CK\" in the last 168 hours.    Inflammatory Markers  Recent Labs   Lab 05/16/25  0546   CRP 30.20*       Imaging: Imaging data reviewed in Epic.    Medications: Scheduled Medications[1]    Assessment & Plan:     Sujey Medina is a 75 year old female with PMH sig for DL, HTN, CKD III, MDD, GERD, bladder ca stage iv w/ mets to brain sp craniotomy and frontal tumor resection presents to ED at [facility] w/ weakness, sob,  and falls.     Bacterial pneumonia  - Mild patchy airspace opacities noted on CTA chest although afebrile, on RA and no leukocytosis  - RVP neg, procal elevated; legionella Ag, strep pneumo Ag - neg  - Check MRSA nares  - ID consulted >> apprec recs >> cont iv abx as ordered  - Robitussin PRN  -fever spikes yest >> check labs, repeat blood cxs drawn, abx adjusted to andrae and vanco  -labs repeated dont show uptrend in wbc    Concern for subdural empyema near surgical resection cavity   Metastatic Urothelial Cell Carcinoma with mets to brain  - Recent left craniotomy for tumor resection on 4/16/25 with Dr. Daigle  - Follows with neurosurgery, oncology and radiation oncology outpatient  - On keppra PTA >> continue  - Neurosurgery consulted >> seen by Dr Daigle >> no  neurosurgical intervention indicated   - MRI brain w/ and w/o contrast to be obtained from [facility] PACS or may consider repeating here if neuro status worsens  -dw dr bourgeois at bedside, no further work up now, and no surgical intervention needed at this time, fu in 2 wks  - trend fever  - cont iv abx as ordered  - ID following >> apprec recs  -PT/OT    Recurrent Falls  Generalized Weakness  Dizziness  - Suspect related to above   - No obvious focal neurological deficits  - CTH with no acute findings  - Check keppra level  - PT/OT assessment in progress >> will dw  regarding care at home vs rehab    HTN  DL  -stable  -resume hoome meds as able     GERD  -PPI     MDD  -resume home meds        Quality:  DVT Prophylaxis: lovenox, scds  CODE status: full code  If COVID testing is negative, may discontinue isolation: yes      DISPO:  Cont inpt for now  ID following >> abx adjusted up to vanc and andrae given fevers  Dw with CM Mayra to help with DC plan  ID following, pt needs to be fever free x 24 hours prior to dc        Cont abx as ordered  NSGY - following   ID following     Plan of care discussed with patient and , all questions answered.           Kayleigh Mills MD  [facility] Hospitalist  Pager [phone]  Answering Service number: [phone]                  [1]    meropenem  500 mg Intravenous Q12H    vancomycin  15 mg/kg Intravenous Q24H    buPROPion SR  200 mg Oral Daily    busPIRone  5 mg Oral BID    famotidine  20 mg Oral Daily    metoprolol tartrate  25 mg Oral BID    atorvastatin  20 mg Oral Nightly    melatonin  3 mg Oral Nightly    heparin  5,000 Units Subcutaneous 2 times per day    dexamethasone  2 mg Oral BID    levetiracetam  1,000 mg Oral BID

## 2025-05-21 NOTE — PLAN OF CARE
A/O x4  VSS. 1L NC  Craniotomy site MANDY, CDI  IV abx- vanco and merrem  RLQ urostomy in place  Regular diet, poor appetite  Up w/ walker x1  Denies pain  Safety measures in place, call light w/in reach  Discussed plan of care

## 2025-05-21 NOTE — PROGRESS NOTES
[facility]   part of [facility] Infectious Disease  Progress Note    Sujey Medina Patient Status:  Inpatient    3/6/1950 MRN [id number]   Location [facility] 7NE-A Attending Kayleigh Mills MD   Hosp Day # 6 PCP Rajni Tiwari MD     Subjective:  Patient seen and examined sitting up in bed. Still with some weakness and SOB, but slightly better today. Tolerating IV antibiotics. Afebrile. Denies n/v/d. Otherwise no new complaints.     Objective:  Blood pressure 100/60, pulse 72, temperature 97.7 °F (36.5 °C), temperature source Axillary, resp. rate (!) 33, weight 139 lb 15.9 oz (63.5 kg), SpO2 95%, not currently breastfeeding.    Intake/Output:    Intake/Output Summary (Last 24 hours) at 2025 0849  Last data filed at 2025 0400  Gross per 24 hour   Intake 250 ml   Output 1230 ml   Net -980 ml       Physical Exam:  General: Awake, alert, non-tox, NAD.  HEENT:  Oropharynx clear, trachea ML.  Heart: RRR S1S2 no murmurs.  Lungs: Essentially CTA b/l, no rhonchi, rales, wheezes.  Abdomen: Soft, NT/ND.  BS present.  No guarding or rebound.  Extremity: No edema.  Neurological: No focal deficits.  Derm:  Warm and dry.    Lab Data Review:  Lab Results   Component Value Date    WBC 8.8 2025    HGB 10.5 2025    HCT 30.5 2025    .0 2025    CREATSERUM 0.91 2025    CREATSERUM 0.91 2025    BUN 33 2025     2025    K 4.5 2025     2025    CO2 19.0 2025     2025    CA 8.5 2025        Cultures:  No results found for this visit on 05/15/25.    Radiology:  XR CHEST AP PORTABLE  (CPT=71045)  Result Date: 2025  CONCLUSION:  Stable heart size.  Postsurgical changes left upper lung.  Port-A-Cath tip SVC.  Increasing infiltrates/edema throughout the right lung with stable prominent interstitial markings in the left lung.  No pneumothorax.   LOCATION:  [id number]      Dictated by (CST):  Dacia Ortega MD on 5/20/2025 at 3:35 PM     Finalized by (CST): Dacia Ortega MD on 5/20/2025 at 3:36 PM       Assessment and Plan:  1. B/l pneumonia  - MRSA PCR negative.  - Urine strep pneumo and Legionella Ag negative.  - RVP at [facility] was negative.  - Had single episode of fever on 5/19/25 and again at noon on 5/20/25 -- currently afebrile.  - IV cefepime transitioned to IV meropenem + vancomycin on 5/20/25 given fevers with chills.  - Blood cultures pending.  - RVP negative.  - CXR with increasing infiltrate/edema throughout R lung.  - IV meropenem + vancomycin, ongoing.    2.  Fevers with chills  - As per the above.    3. Question of subdural empyema  - This is in the setting of a craniotomy done on 4/17/25 for metastatic cancer.   - Neurosurgery following -- no signs of intracranial hypertension or infection, recommend to defer any repeat MRI unless neurologic exam worsens.    4. Recs  - Continue IV meropenem + vancomycin, pharmacy to dose.    - F/u WBC and fever curve.  - F/u cultures.  - Consider CT chest/ab if any clinical worsening.   - Supportive care as per the primary team.  - Discussed plan of care with nursing.  - Discussed plan of care with patient. All questions addressed and understanding verbalized.  - Further recommendations pending clinical course.     Discussed case with ID attending/collaborating physician, Dr. Tommy Carlton, who is in agreement with the above plan of care    Please note that this report has been produced using speech recognition software and may contain errors related to that system including, but not limited to, errors in grammar, punctuation, and spelling, as well as words and phrases that possibly may have been recognized inappropriately.  If there are any questions or concerns, contact the dictating provider for clarification.     The 21st Century Cures Act makes medical notes like these available to patients in the interest of transparency. Please be advised this is a  medical document. Medical documents are intended to carry relevant information, facts as evident, and the clinical opinion of the practitioner. The medical note is intended as peer to peer communication and may appear blunt or direct. It is written in medical language and may contain abbreviations or verbiage that are unfamiliar.     If you have any questions or concerns please call [facility] Infectious Disease at [phone].     Bobby Delarosa, ADALBERTO    5/19/2025  8:29 AM

## 2025-05-22 ENCOUNTER — APPOINTMENT (OUTPATIENT)
Dept: CT IMAGING | Facility: HOSPITAL | Age: 75
End: 2025-05-22
Attending: INTERNAL MEDICINE
Payer: MEDICARE

## 2025-05-22 ENCOUNTER — APPOINTMENT (OUTPATIENT)
Dept: GENERAL RADIOLOGY | Facility: HOSPITAL | Age: 75
End: 2025-05-22
Attending: INTERNAL MEDICINE
Payer: MEDICARE

## 2025-05-22 ENCOUNTER — TELEPHONE (OUTPATIENT)
Dept: SURGERY | Facility: CLINIC | Age: 75
End: 2025-05-22

## 2025-05-22 LAB
ALBUMIN SERPL-MCNC: 3.6 G/DL (ref 3.2–4.8)
ALP LIVER SERPL-CCNC: 101 U/L (ref 55–142)
ALT SERPL-CCNC: 90 U/L (ref 10–49)
AST SERPL-CCNC: 76 U/L (ref ?–34)
BILIRUB DIRECT SERPL-MCNC: <0.1 MG/DL (ref ?–0.3)
BILIRUB SERPL-MCNC: 0.3 MG/DL (ref 0.2–1.1)
CREAT BLD-MCNC: 0.99 MG/DL (ref 0.55–1.02)
EGFRCR SERPLBLD CKD-EPI 2021: 59 ML/MIN/1.73M2 (ref 60–?)
PROT SERPL-MCNC: 5.9 G/DL (ref 5.7–8.2)

## 2025-05-22 PROCEDURE — 94760 N-INVAS EAR/PLS OXIMETRY 1: CPT

## 2025-05-22 PROCEDURE — 92610 EVALUATE SWALLOWING FUNCTION: CPT

## 2025-05-22 PROCEDURE — 82565 ASSAY OF CREATININE: CPT | Performed by: STUDENT IN AN ORGANIZED HEALTH CARE EDUCATION/TRAINING PROGRAM

## 2025-05-22 PROCEDURE — 97530 THERAPEUTIC ACTIVITIES: CPT

## 2025-05-22 PROCEDURE — 74230 X-RAY XM SWLNG FUNCJ C+: CPT | Performed by: INTERNAL MEDICINE

## 2025-05-22 PROCEDURE — 92611 MOTION FLUOROSCOPY/SWALLOW: CPT

## 2025-05-22 PROCEDURE — 80076 HEPATIC FUNCTION PANEL: CPT | Performed by: INTERNAL MEDICINE

## 2025-05-22 PROCEDURE — 71275 CT ANGIOGRAPHY CHEST: CPT | Performed by: INTERNAL MEDICINE

## 2025-05-22 NOTE — TELEPHONE ENCOUNTER
Patient appointment rescheduled successfully due to hospitalization, appt now placed for 6/24/25.

## 2025-05-22 NOTE — PHYSICAL THERAPY NOTE
PHYSICAL THERAPY TREATMENT NOTE - INPATIENT    Room Number: 7600/7600-A     Session: 2     Number of Visits to Meet Established Goals: 5  History related to current admission: Patient is a 75 year old female admitted on 5/15/2025: Presents with inc weakness, dizziness and falls in the setting of L frontal crani for brain mets/tumor resection 4/16/25. Dx concern for subdural empyema near surgical cavity. Additionally with dyspnea and concern for PNA     [facility] 5/14-5/15/25: SOB  ED 5/12: fall   4/10-4/20/25: L frontal lobe mass with vasogenic edema s/p /p left image guided craniotaomy for brain mass on 4/16 > [facility]     HOME SITUATION  Type of Home: Condo  Home Layout: One level, Elevator                     Lives With: Spouse    Drives: Yes   Patient Regularly Uses: None       Prior Level of [place]: pt/spouse report multiple falls at home since previous admit, reports compliance with use of RW, states sometimes her legs give out or she trips  seemingly more with the LLE than the R     Presenting Problem: falls  Co-Morbidities : DL, HTN, CKD III, MDD, GERD, 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  Pt with mets to the lungs s/p bronch, R VATS with RML lobectomy on 1/24/2024.    PHYSICAL THERAPY ASSESSMENT   Patient demonstrates limited progress this session, goals  updated to reflect patient performance.  Secure messaged medical team - pt continues to demonstrate elevated RR with minimal activity and now hypotensive.     Patient is requiring contact guard assist as a result of the following impairments: decreased endurance/aerobic capacity, decreased muscular endurance, and medical status.     Patient continues to function below baseline with gait.  Next session anticipate patient to progress gait.  Physical Therapy will continue to follow patient for duration of hospitalization.    Patient continues to  benefit from continued skilled PT services: .    PLAN DURING HOSPITALIZATION  Nursing Mobility Recommendation : 1 Assist  PT Device Recommendation: Rolling walker  PT Treatment Plan: Bed mobility, Body mechanics, Coordination, Endurance, Energy conservation, Patient education, Family education, Gait training, Range of motion, Neuromuscular re-educate, Strengthening, Transfer training, Balance training  Frequency (Obs): 3-5x/week     CURRENT GOALS     Goal #1 Patient is able to demonstrate supine - sit EOB @ level: supervision      Goal #2 Patient is able to demonstrate transfers EOB to/from Chair/Wheelchair at assistance level: supervision      Goal #3 Patient is able to ambulate 150 feet with assist device: walker - rolling at assistance level: supervision   MET  > progress to 300   Goal #4     Goal #5     Goal #6     Goal Comments: Goals established on 2025 all goals ongoing     SUBJECTIVE  \" I feel dizzy\"    OBJECTIVE  Precautions: Bed/chair alarm    WEIGHT BEARING RESTRICTION     PAIN ASSESSMENT   Ratin  Location: pt denies       BALANCE                                                                                                                       Static Sitting: Good  Dynamic Sitting: Good           Static Standing: Fair -  Dynamic Standing: Fair -    ACTIVITY TOLERANCE * RN notified*       25 1451 25 1455 25 1500   Vital Signs   Resp (!) 45 (!) 35 (!) 30   BP (!) 85/61  (dizzy) (!) 89/62  (still dizzy) (!) 77/45   BP Location Left arm Left arm Left arm   BP Method Automatic Automatic Automatic   Patient Position Sitting Sitting Sitting          Resp: (!) 32  BP: 92/54 *in bedside chair with feet elevated  BP Location: Left arm  BP Method: Automatic  Patient Position: Sitting    O2 WALK  Oxygen Therapy  SPO2% on Oxygen at Rest: 94  At rest oxygen flow (liters per minute): 1    AM-PAC '6-Clicks' INPATIENT SHORT FORM - BASIC MOBILITY  How much difficulty does the  patient currently have...  Patient Difficulty: Turning over in bed (including adjusting bedclothes, sheets and blankets)?: None   Patient Difficulty: Sitting down on and standing up from a chair with arms (e.g., wheelchair, bedside commode, etc.): A Little   Patient Difficulty: Moving from lying on back to sitting on the side of the bed?: None   How much help from another person does the patient currently need...   Help from Another: Moving to and from a bed to a chair (including a wheelchair)?: A Little   Help from Another: Need to walk in hospital room?: A Little   Help from Another: Climbing 3-5 steps with a railing?: A Lot     AM-PAC Score:  Raw Score: 19   Approx Degree of Impairment: 41.77%   Standardized Score (AM-PAC Scale): 45.44   CMS Modifier (G-Code): CK    FUNCTIONAL ABILITY STATUS  Gait Assessment   Functional Mobility/Gait Assessment  Gait Assistance: Contact guard assist  Distance (ft): 1  Assistive Device: Rolling walker  Pattern: Shuffle    Skilled Therapy Provided    Bed Mobility:  Rolling: mod I    Supine<>Sit: SBA, inc time   Sit<>Supine: NT     Transfer Mobility:  Sit<>Stand: CGA - vc for BUE on bed to push off for leverage   Stand<>Sit: CGA   Gait: CGA to chair only.     Therapist's Comments: Discussed case with RN prior to session initiation. Pt agreeable to participation in therapy. Gait belt donned for out of bed mobility.  See BP above assessed throughout session. Performed seated LAQ, marching, ankle pumps, hand pumps, elbow flex/extension x10. Facilitated transfer to bedside chair only due to elevated RR up to 45 with minimal activity and hypotension. Secure messaged medical team.       Patient End of Session: Up in chair, Needs met, Call light within reach, RN aware of session/findings, All patient questions and concerns addressed, Hospital anti-slip socks, Alarm set    PT Session Time: 23 minutes  Gait Training: minutes  Therapeutic Activity: 23  minutes  Therapeutic Exercise:  minutes    Neuromuscular Re-education:  minutes

## 2025-05-22 NOTE — SLP NOTE
ADULT BEDSIDE SWALLOWING EVALUATION    ASSESSMENT    ASSESSMENT/OVERALL IMPRESSION:  Order received for BSE to r/o aspiration; chart reviewed.  Patient is 75 y.o who transferred here from [facility] on 5/15/25 after presenting with persisting SOB, increased dizziness, falls at home and increased weakness. Patient found to have PNA.  Repeat CXR worsening. Medical history includes bladder cancer with left frontal lobe mass s/p left frontal craniotomy for tumor resection on 4/16, s/p chemotherapy and radical bladder resection and urostomy 2011, GERD, CKD3, MDD, DL, HTN, mets to lungs s/p bronch, R VATS with RML lobectomy 1/2024.  Patient on regular texture diet with thin liquids.      Patient received awake, alert, and pleasant.  Patient on 1-2 L O2 via NC.  Patient with no focal oral motor deficits.  Patient vocal quality clear and speech intelligibility 100%. Patient followed simple oral motor commands.  Patient communication function significantly improved since this SLP last visit back in 4/2025.  Patient denied dysphagia.  Patient able to self administer PO trials.     Patient exhibited no overt clinical s/s aspiration observed however did c/o SOB with increasing/challenging PO trials.  Recommend further objective assessment via VFSS to r/o aspiration.  D/W patient and RN; both expressed understanding and agreement.  Will proceed with VFSS today. Recommend pills one at a time with puree form (I.e. applesauce, etc).           RECOMMENDATIONS    Medication Administration Recommendations: One pill at a time, Whole in puree  Treatment Plan/Recommendations: Videofluoroscopic swallow study    HISTORY   MEDICAL HISTORY  Reason for Referral: R/O aspiration    Problem List  Active Problems:    Subdural empyema (HCC)      Past Medical History  Past Medical History[1]    Prior Living Situation: Home with spouse  Diet Prior to Admission: Regular, Thin liquids       Patient/Family Goals: to eat breakfast    SWALLOWING  HISTORY  Current Diet Consistency: NPO  Dysphagia History: regular, thin liquids  Imaging Results: CXR:  Increasing infiltrates/edema throughout the right lung with stable prominent interstitial markings in the left lung.       OBJECTIVE   ORAL MOTOR EXAMINATION  Dentition: Functional  Symmetry: Within Functional Limits  Strength: Within Functional Limits  Tone: Within Functional Limits  Range of Motion: Within Functional Limits       Voice Quality: Clear  Respiratory Status: Nasal cannula, Supplemental O2  Consistencies Trialed: Thin liquids  Method of Presentation: Self presentation  Patient Positioned: Upright, Midline    Oral Phase of Swallow: Within Functional Limits     Pharyngeal Phase of Swallow: Within Functional Limits           (Please note: Silent aspiration cannot be evaluated clinically. Videofluoroscopic Swallow Study is required to rule-out silent aspiration.)    Esophageal Phase of Swallow: No complaints consistent with possible esophageal involvement            GOALS  Goal #1 VFSS to be completed NEW     FOLLOW UP  Treatment Plan/Recommendations: Videofluoroscopic swallow study     Follow Up Needed (Documentation Required): Yes  SLP Follow-up Date: 05/22/25    Thank you for your referral.   If you have any questions, please contact JUAN Rivera MS CCC-SLP/L  Speech-Language Pathologist  Spectra-Link [phone]         [1]   Past Medical History:   Cancer (HCC)    CKD (chronic kidney disease), stage III (HCC)    HYPERLIPIDEMIA    Hypertension    OSTEOPENIA    Personal history of bladder cancer    S/P ileoconduit

## 2025-05-22 NOTE — PROGRESS NOTES
[facility]  Neurosurgery Progress Note  2025    Sujey Medina Patient Status:  Inpatient    3/6/1950 MRN [id number]   Location [facility] 7NE-A Attending Kayleigh Mills MD   Hosp Day # 7 PCP Rajni Tiwari MD     SUBJECTIVE:  Sujey Medina is a(n) 75 year old female status post craniotomy for metastatic disease  Doing better this morning, more comfortable. Abx switched per ID for PNA. Patient placed on 1L NC overnight. Feels slightly better today      OBJECTIVE / PHYSICAL EXAM:  Vital Signs:  Blood pressure 105/70, pulse 72, temperature 97.5 °F (36.4 °C), temperature source Oral, resp. rate 24, weight 139 lb 15.9 oz (63.5 kg), SpO2 96%, not currently breastfeeding.  Awake, alert  Tachypneic, slightly diaphoretic  Oriented x 3  Follows commands x 4 with good strength      Lab Results (last 24 hours):  Recent Results (from the past 24 hours)   Creatinine, Serum    Collection Time: 25  5:00 AM   Result Value Ref Range    Creatinine 0.99 0.55 - 1.02 mg/dL    eGFR-Cr 59 (L) >=60 mL/min/1.73m2   Hepatic Function Panel (7)    Collection Time: 25  5:00 AM   Result Value Ref Range    AST 76 (H) <34 U/L    ALT 90 (H) 10 - 49 U/L    Alkaline Phosphatase 101 55 - 142 U/L    Bilirubin, Total 0.3 0.2 - 1.1 mg/dL    Bilirubin, Direct <0.1 <=0.3 mg/dL    Total Protein 5.9 5.7 - 8.2 g/dL    Albumin 3.6 3.2 - 4.8 g/dL       Assessment/Plan:  75-year-old female s/p Left frontal craniotomy for metastatic urothelial carcinoma resection (2025)   No signs of CNS infection  Recommend Hospitalist/ID optimization for pneumonia  Neurosurgery to sign off. Please call back if any new questions, concerns or neuro changes. Upon discharge patient to follow up with Dr. Daigle in 1 month. Continue treatment per Oncology once deemed appropriate.      Patient seen with [facility] Neuro[facility]  2025 9:11 AM   Patient seen examined with PA  Case discussed  She is doing  well from neurostandpoint  Seen with ID as well  Main issues is her pneumonia  No worry about a CNS infection  She can follow-up in 1 month  All questions were answered  Patient appreciative

## 2025-05-22 NOTE — TELEPHONE ENCOUNTER
Message below noted.    Received the following staff message-    \"Patient was recently re-admitted to the hospital for pneumonia so since we saw her inpatient can we please have her rescheduled from 5/27 to 1 month from now? Thank you!\"    Routed to .

## 2025-05-22 NOTE — VIDEO SWALLOW STUDY NOTE
ADULT VIDEOFLUOROSCOPIC SWALLOWING STUDY    Admission Date: 5/15/2025  Evaluation Date: 05/22/25  Radiologist: Dr Agustin    RECOMMENDATIONS   Diet Recommendations - Solids: Regular  Diet Recommendations - Liquids: Thin Liquids    Aspiration Precautions: No straw, Upright position    Medication Administration Recommendations: One pill at a time, Whole in puree      Further Follow-up:  Follow Up Needed (Documentation Required): Yes  SLP Follow-up Date: 05/23/25  Treatment Plan/Recommendations: Aspiration precautions, Communication evaluation    HISTORY   Background/Objective Information: Patient with worsening PNA in light of antibiotic treatment.  VFSS recommended to r/o aspiration and further assess swallow function safety.       Problem List  Active Problems:    Subdural empyema (HCC)      Past Medical History  Past Medical History[1]    Current Diet Consistency: NPO  Prior Level of Function: Assistance/Support for ADL's  Prior Living Situation: Home with spouse  History of Recent: Pneumonia     Imaging results: CXR 5/20/25:  Increasing infiltrates/edema throughout the right lung with stable prominent interstitial markings in the left lung.     Reason for Referral: R/O aspiration      Family/Patient Goals: to eat/drink    ASSESSMENT   DYSPHAGIA ASSESSMENT  Test completed in conjunction with Radiologist.  Patient Positioned: Upright, Midline, HILL chair.  Patient Viewed: Lateral.  Patient Alertness: Fully alert.  Consistencies Presented: Thin liquids, Puree, Hard solid to assess oropharyngeal swallow function and assess for compensatory strategies to improve safe swallow function.    THIN LIQUIDS  Method of Presentation: Cup  Oral Phase of Swallow (VFSS - Thin Liquids): Within Functional Limits  Laryngeal Penetration: None  Tracheal Aspiration: None        PUREE  Oral Phase of Swallow (VFSS - Puree): Within Functional Limits  Laryngeal Penetration: None  Tracheal Aspiration: None     HARD SOLID  Oral Phase of  Swallow (VFSS - Hard Solid): Within Functional Limits  Laryngeal Penetration: None  Tracheal Aspiration: None  Penetration Aspiration Scale Score: Score 1: Material does not enter airway       Overall Impression: Unremarkable oropharyngeal swallow study with no evidence of laryngeal penetration, tracheal aspiration, or hypopharyngeal residuals observed within scope of this study across multiple trials of various consistencies.  Overall swallow safety deemed adequate.  Observed mild, inconsistent opost swallow residue within vallecular and/or pyriform sinus however cleared with subsequent dry swallow.  Patient was unable to extract bolus trial via straw sip therefore recommend no straw use as it was not assessed during this study.               GOALS  Goal #1 VFSS to be completed MET   Goal #2 The patient will tolerate regular consistency and thin liquids without overt signs or symptoms of aspiration with 90 % accuracy over 1-2 session(s).    In Progress   Goal #3 The patient/family/caregiver will demonstrate understanding and implementation of aspiration precautions and swallow strategies independently over 1-2 session(s).  In Progress   Goal #4 Assess communication/cognitive function and assist with discharge recommendations, given h/o recent craniotomy and post op aphasia   New       EDUCATION/INSTRUCTION  Reviewed results and recommendations with patient/RN.  Agreement/Understanding verbalized and all questions answered to their apparent satisfaction.    INTERDISCIPLINARY COMMUNICATION  Reviewed results with Radiologist; agreement verbalized.    Patient Experiencing Pain: No                FOLLOW UP  Treatment Plan/Recommendations: Aspiration precautions, Communication evaluation       Thank you for your referral.   If you have any questions, please contact JUAN Rivera MS CCC-SLP/L  Speech-Language Pathologist  Spectra-Link [phone]           [1]   Past Medical History:   Cancer (HCC)    CKD  (chronic kidney disease), stage III (HCC)    HYPERLIPIDEMIA    Hypertension    OSTEOPENIA    Personal history of bladder cancer    S/P ileoconduit

## 2025-05-22 NOTE — TELEPHONE ENCOUNTER
Rec'd in basket message from Palma regarding post op appt, psr notes pt has appt 5/27/2025 with Dr Daigle scheduled on 4/22/2025, please advise if an add'l appt is needed.      From: Palma Roper PA  Sent: 5/22/2025   9:13 AM CDT  To: Nikki Toledo 2 Nurse; Nikki Toledo 2 Front Office  Subject: hfu                                              Please schedule 1 mo hospital follow up with Dr. Daigle, hx of crani for metastatic resection 4/16/25. No imaging needed prior to visit, thanks!

## 2025-05-22 NOTE — PROGRESS NOTES
Infectious Disease Progress Note      Date of admission: 5/15/2025  7:11 PM     Reason for consult: Pneumonia    Referring physician: Kayleigh Mills MD    Subjective: Feels well.  No headaches.  No nausea vomiting.  No diarrhea.  Fever resolved since escalating to IV meropenem IV vancomycin on 5/20/2025    The rest of the systems were reviewed and found to be negative except was mentioned above    Interval events: This is a 75-year-old female patient, recently had a craniotomy on 4/17/2025, now presents here with pneumonia.  Currently on IV meropenem and IV vancomycin with clinical improvement.     Medications:  Current Hospital Medications[1]     Allergies:  Allergies[2]    Physical Exam:  Vitals:    05/22/25 0800   BP: 105/70   Pulse: 72   Resp: 24   Temp: 97.5 °F (36.4 °C)     Vitals signs and nursing note reviewed.   Constitutional:       Appearance: Normal appearance.   HENT:      Head: Normocephalic and atraumatic.      Mouth: Mucous membranes are moist.   Neck:      Musculoskeletal: Neck supple.   Cardiovascular:      Rate and Rhythm: Normal rate.      Heart sounds: Normal heart sounds. No murmur. No friction rub. No gallop.    Pulmonary:      Effort: Pulmonary effort is normal. No respiratory distress.      Breath sounds: Normal breath sounds. No stridor. No wheezing, rhonchi or rales.   Chest:      Chest wall: No tenderness.   Abdominal:      General: Abdomen is flat. There is no distension.      Palpations: Abdomen is soft. There is no mass.      Tenderness: There is no tenderness. There is no guarding or rebound.      Hernia: No hernia is present.   Musculoskeletal:      Right lower leg: No edema.      Left lower leg: No edema.   Skin:     General: Skin is warm and dry.   Neurological:      General: No focal deficit present.      Mental Status: Alert and oriented to person, place, and time.       Laboratory data:  I have reviewed all the lab results independently.  Lab Results   Component Value Date     CREATSERUM 0.99 05/22/2025    ALB 3.6 05/22/2025    ALKPHO 101 05/22/2025    BILT 0.3 05/22/2025    TP 5.9 05/22/2025    AST 76 05/22/2025    ALT 90 05/22/2025      Recent Labs   Lab 05/20/25 2033 05/21/25  0515   RBC 3.12* 3.14*   HGB 10.5* 10.5*   HCT 30.2* 30.5*   MCV 96.8 97.1   MCH 33.7 33.4   MCHC 34.8 34.4   RDW 16.1 15.9   NEPRELIM 6.61 6.64   WBC 9.0 8.8   .0* 117.0*   NEUT 82 73   LYMPH 4 5   MON 1 1   EOS 2 2   NRBC 2*  --       Microbiology data:  Hospital Encounter on 05/15/25   1. Blood Culture     Status: None (Preliminary result)    Collection Time: 05/20/25  1:00 PM    Specimen: Blood,peripheral   Result Value Ref Range    Blood Culture Result No Growth 1 Day N/A        Radiology:  I have reviewed all imagining data available independently.   MRI of the brain on 5/15/2025:  1.  Left frontal craniotomy changes with encephalomalacia.  Surgical resection cavity in the anterior left frontal lobe with hemosiderin staining/chronic blood products.  Overlying small subdural collection with associated restricted diffusion and enhancement concerning for subdural empyema.  Clinical correlation is advised.  There is adjacent dural thickening and enhancement.  Consider neurosurgical consultation.     2.  Minimal enhancement along the posterior margin of the left frontal lobe surgical resection cavity, nonspecific finding.  No other areas of pathologic enhancement are noted.     3.  No acute infarct, hydrocephalus, or midline shift.  Chronic microvascular ischemic changes bilaterally.      CT chest on 5/14/2025:  1.  Negative exam for central and proximal segmental pulmonary emboli.     2.  Mild bibasilar patchy airspace opacities likely represent infectious/inflammatory process in the appropriate clinical setting. Follow-up to resolution is recommended.     Impression:  Sujey Medina is a 75 year old female with    Bilateral pneumonia  MRSA screen was negative  Urine strep pneumo and Legionella  antigens were negative  RVP at [facility] was also negative  Originally on IV cefepime; however, developed recurrent fevers on 5/19 and 5/20.  Chest x-ray with worsening pneumonia  Escalated to IV meropenem on 5/20/2025 with for clinical improvement symptomatically and resolution of her fever.  Repeat blood cultures from 5/20 remain negative to date.  RVP from 5/20 was negative.  Question of subdural empyema  Seen by neurosurgery, ruled out at this point.    Recommendations:    Please obtain a swallow eval to rule out silent aspiration given the recurrent pneumonia while still on cefepime  Continue IV meropenem and IV vancomycin, will plan on a 5-day course through 5/24/2025  Supportive care as per the primary team  Continue to monitor daily labs for antibiotic toxicities  Further recommendations will depend on the above work-up and clinical progress     The plan of care was discussed with the primary hospital team, Kayleigh Mills MD     Recommendations were also discussed with the patient; all questions were answered.     Thank you for this consultation. Please don't hesitate to call the ID team for questions or any acute changes in patient's clinical condition.    Please note that this report has been produced using speech recognition software and may contain errors related to that system including, but not limited to, errors in grammar, punctuation, and spelling, as well as words and phrases that possibly may have been recognized inappropriately.  If there are any questions or concerns, contact the dictating provider for clarification.    The 21st Century Cures Act makes medical notes like these available to patients in the interest of transparency. Please be advised this is a medical document. Medical documents are intended to carry relevant information, facts as evident, and the clinical opinion of the practitioner. The medical note is intended as peer to peer communication and may appear blunt or  direct. It is written in medical language and may contain abbreviations or verbiage that are unfamiliar.     Tommy Carlton MD  [facility]. Tel: [phone]. Fax: [phone].     Sujey Medina : 3/6/1950 MRN: [id number] CSN: [id number]          [1]   meropenem    [COMPLETED] vancomycin **FOLLOWED BY** vancomycin    polyethylene glycol (PEG 3350)    buPROPion SR    busPIRone    famotidine    metoprolol tartrate    atorvastatin    melatonin    guaiFENesin    albuterol    heparin    acetaminophen    [COMPLETED] dexamethasone **FOLLOWED BY** dexamethasone    levetiracetam  [2] No Known Allergies

## 2025-05-22 NOTE — PLAN OF CARE
Assumed care at 0730.  A&O x4, afebrile, NSR on tele, VSS.  Weaned down to 0.5L NC. Desatting to high 80s on RA.  Hypotensive and symptomatic with PT, pressures stabilized spontaneously.   Tachypneic with exertion.   Video swallow completed to r/o silent aspiration.   IV abx.   CTA chest ordered to r/o PE, to be completed.  Up stand pivot to chair.  Urostomy intact, yellow output.  No BM this shift.  Denies pain.   Seizure precautions maintained.  Safety and comfort measures in place.  Patient and spouse at bedside updated on POC.

## 2025-05-22 NOTE — PLAN OF CARE
A/O x4  VSS. 1L NC  Tachypneic  Crani site MANDY, CDI  IV abx- vanco and merrem  RLQ urostomy in place  Regular diet, poor appetite  Up w/ walker x1  Denies pain  Safety measures in place, call light w/in reach  Discussed plan of care

## 2025-05-22 NOTE — PLAN OF CARE
This note is added to further explain clinical significance.    Vasogenic edema associated with brain mass IS clinically significant.    Mehreen Mckay, APRN

## 2025-05-22 NOTE — PROGRESS NOTES
[facility]   part of [facility] Hospitalist Progress Note     Sujey Medina Patient Status:  Inpatient    3/6/1950 MRN [id number]   Location [facility] 7NE-A Attending Kayleigh Mills MD   Hosp Day # 7 PCP Rajni Tiwari MD     Subjective:     Patient seen and examined.   Felt more tired yest  Tmax improved 99.1  On 1L O2  Repeat blood cx NTD  Repeat labs show no elev in wbc  Cxr shows possible increase in consolidation  RR intermittently in the 30s  ID adjusted abx - switched to andrae and vanco    Objective:    Review of Systems:   10 point ROS completed and was negative, except for pertinent positive and negatives stated in subjective.    Vital signs:  Temp:  [97.5 °F (36.4 °C)-99.1 °F (37.3 °C)] 97.5 °F (36.4 °C)  Pulse:  [69-99] 72  Resp:  [23-36] 24  BP: ()/(56-80) 105/70  SpO2:  [95 %-99 %] 96 %  HEENT: craniotomy scar visible, clean dry intact, staple.  Neck: No lymphadenopathy. No JVD. No carotid bruits.  Respiratory: Clear to auscultation bilaterally. No wheezes. No rhonchi.  Cardiovascular: S1, S2. Regular rate and rhythm. No murmurs, rubs or gallops. Equal pulses.   Chest and Back: No tenderness or deformity.  Abdomen: Soft, nontender, nondistended.  Positive bowel sounds. No rebound, guarding or organomegaly.  Neurologic: No focal neurological deficits. CNII-XII grossly intact.  Musculoskeletal: Moves all extremities.  Extremities: No edema or cyanosis.  Integument: No rashes or lesions.   Psychiatric: Appropriate mood and affect.      Diagnostic Data:    Labs:  Recent Labs   Lab 25  0546 25  0515   WBC 6.8 9.0 8.8   HGB 10.8* 10.5* 10.5*   MCV 96.6 96.8 97.1   .0* 116.0* 117.0*   BAND 9 5 14       Recent Labs   Lab 25  0546 25  1437 25  0556 25  0515 25  0500   *  --   --  154* 136*  --    BUN 18  --   --  40* 33*  --    CREATSERUM 1.03*  1.03*  --    < > 1.12* 0.91   0.91 0.99   CA 8.3*  --   --  8.6* 8.5*  --    ALB  --   --   --   --   --  3.6   *  --   --  134* 135*  --    K 3.6 3.9  --  4.5 4.5  --      --   --  106 107  --    CO2 17.0*  --   --  21.0 19.0*  --    ALKPHO  --   --   --   --   --  101   AST  --   --   --   --   --  76*   ALT  --   --   --   --   --  90*   BILT  --   --   --   --   --  0.3   TP  --   --   --   --   --  5.9    < > = values in this interval not displayed.       Estimated Creatinine Clearance: 43.5 mL/min (based on SCr of 0.99 mg/dL).    No results for input(s): \"PTP\", \"INR\" in the last 168 hours.         COVID-19 Lab Results    COVID-19  Lab Results   Component Value Date    COVID19 Not Detected 05/20/2025    COVID19 Not Detected 10/25/2022    COVID19 Not Detected 10/20/2022       Pro-Calcitonin  No results for input(s): \"PCT\" in the last 168 hours.    Cardiac  No results for input(s): \"TROP\", \"PBNP\" in the last 168 hours.    Creatinine Kinase  No results for input(s): \"CK\" in the last 168 hours.    Inflammatory Markers  Recent Labs   Lab 05/16/25  0546   CRP 30.20*       Imaging: Imaging data reviewed in Epic.    Medications: Scheduled Medications[1]    Assessment & Plan:     Sujey Medina is a 75 year old female with PMH sig for DL, HTN, CKD III, MDD, GERD, bladder ca stage iv w/ mets to brain sp craniotomy and frontal tumor resection presents to ED at [facility] w/ weakness, sob,  and falls.     Bacterial pneumonia  - Mild patchy airspace opacities noted on CTA chest although afebrile, on RA and no leukocytosis  - RVP neg, procal elevated; legionella Ag, strep pneumo Ag - neg  - Check MRSA nares  - ID consulted >> apprec recs >> cont iv abx as ordered  - Robitussin PRN  -fever spikes yest >> check labs, repeat blood cxs drawn, abx adjusted to andrae and vanco  -labs repeated dont show uptrend in wbc    Concern for subdural empyema near surgical resection cavity   Metastatic Urothelial Cell Carcinoma with mets to brain  - Recent left  craniotomy for tumor resection on 4/16/25 with Dr. Daigle  - Follows with neurosurgery, oncology and radiation oncology outpatient  - On keppra PTA >> continue  - Neurosurgery consulted >> seen by Dr Daigle >> no neurosurgical intervention indicated   - MRI brain w/ and w/o contrast to be obtained from [facility] PACS or may consider repeating here if neuro status worsens  -dw dr bourgeois at bedside, no further work up now, and no surgical intervention needed at this time, fu in 2 wks  - trend fever  - cont iv abx as ordered  - ID following >> apprec recs  -PT/OT    Recurrent Falls  Generalized Weakness  Dizziness  - Suspect related to above   - No obvious focal neurological deficits  - CTH with no acute findings  - Check keppra level  - PT/OT assessment in progress >> will dw  regarding care at home vs rehab    HTN  DL  -stable  -resume hoome meds as able     GERD  -PPI     MDD  -resume home meds        Quality:  DVT Prophylaxis: lovenox, scds  CODE status: full code  If COVID testing is negative, may discontinue isolation: yes      DISPO:  Cont inpt for now  ID following >> abx adjusted up to vanc and andrae given fevers  Dw with KELLY Nunez to help with DC plan  ID following, pt needs to be fever free x 24 hours prior to dc      Plan of care discussed with patient and , all questions answered.   states he cannot take care of pt at home.        Kayleigh Mills MD  [facility] Hospitalist  Pager [phone]  Answering Service number: [phone]                  [1]    meropenem  500 mg Intravenous Q12H    vancomycin  15 mg/kg Intravenous Q24H    buPROPion SR  200 mg Oral Daily    busPIRone  5 mg Oral BID    famotidine  20 mg Oral Daily    metoprolol tartrate  25 mg Oral BID    atorvastatin  20 mg Oral Nightly    melatonin  3 mg Oral Nightly    heparin  5,000 Units Subcutaneous 2 times per day    dexamethasone  2 mg Oral BID    levetiracetam  1,000 mg Oral BID

## 2025-05-23 LAB
CREAT BLD-MCNC: 0.87 MG/DL (ref 0.55–1.02)
EGFRCR SERPLBLD CKD-EPI 2021: 69 ML/MIN/1.73M2 (ref 60–?)

## 2025-05-23 PROCEDURE — 92526 ORAL FUNCTION THERAPY: CPT

## 2025-05-23 PROCEDURE — 82565 ASSAY OF CREATININE: CPT | Performed by: STUDENT IN AN ORGANIZED HEALTH CARE EDUCATION/TRAINING PROGRAM

## 2025-05-23 PROCEDURE — 94760 N-INVAS EAR/PLS OXIMETRY 1: CPT

## 2025-05-23 NOTE — PROGRESS NOTES
[facility]   part of [facility] Hospitalist Progress Note     Sujey Medina Patient Status:  Inpatient    3/6/1950 MRN [id number]   Location [facility] 7NE-A Attending Kayleigh Mills MD   Hosp Day # 8 PCP Rajni Tiwari MD     Subjective:     Patient seen and examined.   Pt smiling this morning  Says she feels a little better  Afebrile  Currentl off O2 but was on 1 L O2  Yesterday BP was low while working with PT and RR elevated into the 30s and 40s  CTA chest NEG for PE, but shows ground glass opacities new from prev imaging  ID adjusted abx - switched to andrae and vanco    Objective:    Review of Systems:   10 point ROS completed and was negative, except for pertinent positive and negatives stated in subjective.    Vital signs:  Temp:  [97.1 °F (36.2 °C)-97.7 °F (36.5 °C)] 97.4 °F (36.3 °C)  Pulse:  [70-92] 73  Resp:  [20-45] 20  BP: ()/(45-75) 100/67  SpO2:  [93 %-99 %] 95 %  HEENT: craniotomy scar visible, clean dry intact, staple.  Neck: No lymphadenopathy. No JVD. No carotid bruits.  Respiratory: Clear to auscultation bilaterally. No wheezes. No rhonchi.  Cardiovascular: S1, S2. Regular rate and rhythm. No murmurs, rubs or gallops. Equal pulses.   Chest and Back: No tenderness or deformity.  Abdomen: Soft, nontender, nondistended.  Positive bowel sounds. No rebound, guarding or organomegaly.  Neurologic: No focal neurological deficits. CNII-XII grossly intact.  Musculoskeletal: Moves all extremities.  Extremities: No edema or cyanosis.  Integument: No rashes or lesions.   Psychiatric: Appropriate mood and affect.      Diagnostic Data:    Labs:  Recent Labs   Lab 25  0515   WBC 9.0 8.8   HGB 10.5* 10.5*   MCV 96.8 97.1   .0* 117.0*   BAND 5 14       Recent Labs   Lab 25  1437 25  0556 25  0515 25  0500 25  0544   GLU  --   --  154* 136*  --   --    BUN  --   --  40* 33*  --   --    DARINERUM   --    < > 1.12* 0.91  0.91 0.99 0.87   CA  --   --  8.6* 8.5*  --   --    ALB  --   --   --   --  3.6  --    NA  --   --  134* 135*  --   --    K 3.9  --  4.5 4.5  --   --    CL  --   --  106 107  --   --    CO2  --   --  21.0 19.0*  --   --    ALKPHO  --   --   --   --  101  --    AST  --   --   --   --  76*  --    ALT  --   --   --   --  90*  --    BILT  --   --   --   --  0.3  --    TP  --   --   --   --  5.9  --     < > = values in this interval not displayed.       Estimated Creatinine Clearance: 49.5 mL/min (based on SCr of 0.87 mg/dL).    No results for input(s): \"PTP\", \"INR\" in the last 168 hours.         COVID-19 Lab Results    COVID-19  Lab Results   Component Value Date    COVID19 Not Detected 05/20/2025    COVID19 Not Detected 10/25/2022    COVID19 Not Detected 10/20/2022       Pro-Calcitonin  No results for input(s): \"PCT\" in the last 168 hours.    Cardiac  No results for input(s): \"TROP\", \"PBNP\" in the last 168 hours.    Creatinine Kinase  No results for input(s): \"CK\" in the last 168 hours.    Inflammatory Markers  No results for input(s): \"CRP\", \"ERICK\", \"LDH\", \"DDIMER\" in the last 168 hours.      Imaging: Imaging data reviewed in Epic.    Medications: Scheduled Medications[1]    Assessment & Plan:     Sujey Medina is a 75 year old female with PMH sig for DL, HTN, CKD III, MDD, GERD, bladder ca stage iv w/ mets to brain sp craniotomy and frontal tumor resection presents to ED at [facility] w/ weakness, sob,  and falls.     Bacterial pneumonia  - Mild patchy airspace opacities noted on CTA chest although afebrile, on RA and no leukocytosis  - RVP neg, procal elevated; legionella Ag, strep pneumo Ag - neg  - Check MRSA nares  - ID consulted >> apprec recs >> cont iv abx as ordered  - Robitussin PRN  -fever spikes yest >> check labs, repeat blood cxs drawn, abx adjusted to andrae and vanco  -labs repeated dont show uptrend in wbc  -CTA chest neg for PE >> now with ground glass opacities which is new from  previous imaging    Concern for subdural empyema near surgical resection cavity   Metastatic Urothelial Cell Carcinoma with mets to brain  - Recent left craniotomy for tumor resection on 4/16/25 with Dr. Daigle  - Follows with neurosurgery, oncology and radiation oncology outpatient  - On keppra PTA >> continue  - Neurosurgery consulted >> seen by Dr Daigle >> no neurosurgical intervention indicated   - MRI brain w/ and w/o contrast to be obtained from [facility] PACS or may consider repeating here if neuro status worsens  -dw dr bourgeois at bedside, no further work up now, and no surgical intervention needed at this time, fu in 2 wks  - trend fever  - cont iv abx as ordered  - ID following >> apprec recs  -PT/OT    Hypotension - likely orthostatic  -check orthostatic vitals q shift  -fluids if needed    Recurrent Falls  Generalized Weakness  Dizziness  - Suspect related to above   - No obvious focal neurological deficits  - CTH with no acute findings  - Check keppra level  - PT/OT assessment in progress >> will dw  regarding care at home vs rehab    HTN  DL  -stable  -resume hoome meds as able     GERD  -PPI     MDD  -resume home meds        Quality:  DVT Prophylaxis: lovenox, scds  CODE status: full code  If COVID testing is negative, may discontinue isolation: yes      DISPO:  Cont inpt for now  ID following >> abx adjusted up to vanc and andrae given fevers  Dw with KELLY Nunez to help with DC plan  ID following, pt needs to be fever free x 24 hours prior to dc      Plan of care discussed with patient and , all questions answered.   states he cannot take care of pt at home.        Kayleigh Mckinney Hospitalist  Pager [phone]  Answering Service number: [phone]                  [1]    meropenem  500 mg Intravenous Q12H    vancomycin  15 mg/kg Intravenous Q24H    buPROPion SR  200 mg Oral Daily    busPIRone  5 mg Oral BID    famotidine  20 mg Oral Daily    metoprolol tartrate  25 mg Oral  BID    atorvastatin  20 mg Oral Nightly    melatonin  3 mg Oral Nightly    heparin  5,000 Units Subcutaneous 2 times per day    levetiracetam  1,000 mg Oral BID

## 2025-05-23 NOTE — CONGREGATE LIVING REVIEW
[facility] Authorization    The [facility] Review Committee has reviewed this case and the patient IS APPROVED for discharge to a facility for Short Term Skilled once the following procedure is followed:     - The physician discharge instructions (contained within the [facility]ACHELL note for SNF) must inlcude the below appropriate and approved COVID instructions to the facility    For questions regarding CLRC approval process, please contact the CM assigned to the case.  For questions regarding RN discharge workflow, please contact the unit Clinical Leader.

## 2025-05-23 NOTE — PROGRESS NOTES
Provider Clarification     Additional clarification of the pneumonia being treated.    Pneumonia unable to specify type    This note is part of the patient's medical record.

## 2025-05-23 NOTE — PROGRESS NOTES
[facility]   part of [facility] Infectious Disease  Progress Note    Sujey Medina Patient Status:  Inpatient    3/6/1950 MRN [id number]   Location [facility] 7NE-A Attending Kayleigh Mills MD   Hosp Day # 8 PCP Rajni Tiwari MD     Subjective:  Patient seen and examined sitting up in bed. Feeling well today. SOB improved and currently breathing comfortably on RA. Tolerating IV antibiotics. Afebrile. Denies n/v/d. Otherwise no new complaints.     Objective:  Blood pressure 109/75, pulse 70, temperature 97.1 °F (36.2 °C), temperature source Temporal, resp. rate 25, weight 139 lb 15.9 oz (63.5 kg), SpO2 98%, not currently breastfeeding.    Intake/Output:    Intake/Output Summary (Last 24 hours) at 2025 0716  Last data filed at 2025 0400  Gross per 24 hour   Intake 340 ml   Output 1300 ml   Net -960 ml       Physical Exam:  General: Awake, alert, non-tox, NAD.  HEENT:  Oropharynx clear, trachea ML.  Heart: RRR S1S2 no murmurs.  Lungs: Essentially CTA b/l, no rhonchi, rales, wheezes.  Abdomen: Soft, NT/ND.  BS present.  No guarding or rebound.  Extremity: No edema.  Neurological: No focal deficits.  Derm:  Warm and dry.    Lab Data Review:  Lab Results   Component Value Date    CREATSERUM 0.87 2025        Cultures:  Hospital Encounter on 05/15/25   1. Blood Culture     Status: None (Preliminary result)    Collection Time: 25  1:00 PM    Specimen: Blood,peripheral   Result Value Ref Range    Blood Culture Result No Growth 2 Days N/A       Radiology:  CTA CHEST (CPT=71275)  Result Date: 2025  CONCLUSION:   1. No evidence of pulmonary embolus.  2.  Diffuse patchy ground-glass opacity throughout both lungs which is new since 2025 and is consistent with an acute infectious or inflammatory process as described above.    LOCATION:  [place]   Dictated by (CST): Ivan Christian MD on 2025 at 9:41 PM     Finalized by (CST): Ivan Christian MD on 2025  at 9:44 PM       XR VIDEO SWALLOW (CPT=74230)  Result Date: 5/22/2025  PROCEDURE:  XR VIDEO SWALLOW (CPT=74230)  TECHNIQUE:  Video fluoroscopic swallowing study was performed in cooperation with the speech pathologist.  Barium of varying consistencies was administered orally with patient in lateral projection.  COMPARISON:  None.  INDICATIONS:  r/o aspiration; RUL PNA; h/o frontal brain mass s/p craniotomy  PATIENT STATED HISTORY: (As transcribed by Technologist)  Recent brain surgery   CINE CAPTURES:  8 FLUORSCOPY TIME:  1:19 RADIATION DOSE (AIR KERMA PRODUCT):  216.3 uGy/m2   FINDINGS:  No laryngeal penetration or tracheal aspiration was noted with any of the varying consistencies of barium which were administered.  PLEASE SEE SPEECH PATHOLOGIST REPORT FOR FULL ASSESSMENT OF SWALLOWING MECHANISM.   LOCATION:  [place]    Dictated by (CST): Jez Agustin MD on 5/22/2025 at 12:42 PM     Finalized by (CST): Jez Agustin MD on 5/22/2025 at 12:43 PM       XR CHEST AP PORTABLE  (CPT=71045)  Result Date: 5/20/2025  CONCLUSION:  Stable heart size.  Postsurgical changes left upper lung.  Port-A-Cath tip SVC.  Increasing infiltrates/edema throughout the right lung with stable prominent interstitial markings in the left lung.  No pneumothorax.   LOCATION:  [id number]      Dictated by (CST): Dacia Ortega MD on 5/20/2025 at 3:35 PM     Finalized by (CST): Dacia Ortega MD on 5/20/2025 at 3:36 PM       Assessment and Plan:  1. B/l pneumonia  - MRSA PCR negative.  - Urine strep pneumo and Legionella Ag negative.  - RVP at [facility] was negative.  - IV cefepime transitioned to IV meropenem + vancomycin on 5/20/25 given fevers with chills.  - Blood cultures negative.  - RVP negative.  - CXR with increasing infiltrate/edema throughout R lung.  - VFSS without aspiration.  - CTA chest negative for PE. Diffuse patchy GGO throughout both lungs.   - IV meropenem + vancomycin, ongoing.    2. Question of subdural empyema  - This is in  the setting of a craniotomy done on 4/17/25 for metastatic cancer.   - Seen by neurosurgery and r/o at this point.    3. Recs  - Continue IV meropenem + vancomycin, pharmacy to dose. Plan on 5-day course through 5/24/25.   - F/u WBC and fever curve.  - Supportive care as per the primary team.  - Discussed plan of care with nursing.  - Discussed plan of care with patient. All questions addressed and understanding verbalized.  - ID to sign off from patient care at this time. Please feel free to contact/re-consult should there by any questions or changes in patient clinical status.     Discussed case with ID attending/collaborating physician, Dr. Tommy Carlton, who is in agreement with the above plan of care    Please note that this report has been produced using speech recognition software and may contain errors related to that system including, but not limited to, errors in grammar, punctuation, and spelling, as well as words and phrases that possibly may have been recognized inappropriately.  If there are any questions or concerns, contact the dictating provider for clarification.     The 21st Century Cures Act makes medical notes like these available to patients in the interest of transparency. Please be advised this is a medical document. Medical documents are intended to carry relevant information, facts as evident, and the clinical opinion of the practitioner. The medical note is intended as peer to peer communication and may appear blunt or direct. It is written in medical language and may contain abbreviations or verbiage that are unfamiliar.     If you have any questions or concerns please call [facility] Infectious Disease at [phone].     Bobby Delarosa, ADALBERTO    5/19/2025  8:29 AM

## 2025-05-23 NOTE — SLP NOTE
SPEECH DAILY NOTE - INPATIENT    ASSESSMENT & PLAN   ASSESSMENT  Pt seen for dysphagia tx to assess tolerance with recommended diet, ensure proper utilization of aspiration precautions and provide pt/family education. Spouse present.  VFSS results reviewed and discussed recommendations with rationale.  Patient tolerated PO trials with no overt clinical s/s aspiration.  Patient with reduced mouth opening to regular solid textures per spouse report however patient with no focal deficits noted per VFSS on solid texture trials.  Patient reported preference for softer textures \"it's just easier.\"  Encouraged PO intake for efficiency and nutritional purposes.  Patient on room air and SpO2 95-98%.  Discharge plan is for [facility] and recommend SLP follow-up for cognitive communication evaluation at next level of care.  Spouse reported patient with significant improvement and patient motivated for rehab.        Diet Recommendations - Solids: Regular  Diet Recommendations - Liquids: Thin Liquids, No Straws  Aspiration Precautions: No straw, Upright position  Medication Administration Recommendations: One pill at a time, Whole in puree    Patient Experiencing Pain: No        Treatment Plan  Treatment Plan/Recommendations: No further inpatient SLP service warranted    Interdisciplinary Communication: Discussed with RN            GOALS  Goal #1 VFSS to be completed MET   Goal #2 The patient will tolerate regular consistency and thin liquids without overt signs or symptoms of aspiration with 90 % accuracy over 1-2 session(s).    MET   Goal #3 The patient/family/caregiver will demonstrate understanding and implementation of aspiration precautions and swallow strategies independently over 1-2 session(s). MET   Goal #4 Assess communication/cognitive function and assist with discharge recommendations, given h/o recent craniotomy and post op aphasia    Defer to [facility]       FOLLOW UP  Follow Up Needed (Documentation Required): No; patient to  discharge to [facility] therefore recommend SLP followup at [facility] for cognitive communication eval/needs         Session: 1 of 1    If you have any questions, please contact JUAN Rivera MS CCC-SLP/L  Speech-Language Pathologist  [facility] [phone]

## 2025-05-23 NOTE — PLAN OF CARE
Assumed care at 2000  A&ox4, VSS, up with stand/pivot   No complaints of pain   Tolerating diet   CTA chest complete   R urostomy intact   Patient updated on POC   All questions answered   Call light within reach

## 2025-05-23 NOTE — PLAN OF CARE
Assumed care at approximately 2300  A & O x 4  1 L O2  NSR on tele  R urostomy intact  Denies pain  Call light within reach   Patient and son updated on plan of care

## 2025-05-23 NOTE — PLAN OF CARE
Assumed care at 0730.  A&O x4, RA all shift, afebrile, NSR on tele, VSS.  Tachypneic with exertion.   IV abx.   Up stand pivot to chair.  Urostomy intact, yellow output.  No BM this shift.  Denies pain.   Seizure precautions maintained.  Safety and comfort measures in place.  Patient and spouse at bedside updated on POC.    *Plan to finish IV abx tomorrow, then dc to [facility].

## 2025-05-24 LAB
CREAT BLD-MCNC: 0.98 MG/DL (ref 0.55–1.02)
EGFRCR SERPLBLD CKD-EPI 2021: 60 ML/MIN/1.73M2 (ref 60–?)

## 2025-05-24 PROCEDURE — 94760 N-INVAS EAR/PLS OXIMETRY 1: CPT

## 2025-05-24 PROCEDURE — 82565 ASSAY OF CREATININE: CPT | Performed by: STUDENT IN AN ORGANIZED HEALTH CARE EDUCATION/TRAINING PROGRAM

## 2025-05-24 RX ORDER — FAMOTIDINE 20 MG/1
20 TABLET, FILM COATED ORAL DAILY
Qty: 15 TABLET | Refills: 0 | Status: SHIPPED | OUTPATIENT
Start: 2025-05-24 | End: 2025-06-08

## 2025-05-24 NOTE — PROGRESS NOTES
[facility]   part of [facility] Hospitalist Progress Note     Sujey Medina Patient Status:  Inpatient    3/6/1950 MRN [id number]   Location [facility] 7NE-A Attending Kayleigh Mills MD   Hosp Day # 9 PCP Rajni Tiwari MD     Subjective:     No acute events.  at bedside. Mentation waxing/ waning. Tolerating some po, no headache or dizziness. Last dose of IV abx today. Discussed [facility] at length with .     Objective:    Review of Systems:   10 point ROS completed and was negative, except for pertinent positive and negatives stated in subjective.    Vital signs:  Temp:  [97.1 °F (36.2 °C)-98.7 °F (37.1 °C)] 98 °F (36.7 °C)  Pulse:  [89-96] 96  Resp:  [18-20] 18  BP: ()/(57-66) 87/57  SpO2:  [93 %-97 %] 96 %  HEENT: craniotomy scar visible, clean dry intact, staple.  Neck: No lymphadenopathy. No JVD.   Respiratory: Clear to auscultation bilaterally. No wheezes.   Cardiovascular: S1, S2. Regular rate and rhythm.  Chest and Back: No tenderness or deformity.  Abdomen: Soft, nontender, nondistended.    Neurologic: Answering questions, follows commands.   Musculoskeletal: Moves all extremities.  Extremities: No edema or cyanosis.  Integument: No rashes or lesions.   Psychiatric: Appropriate mood and affect.      Diagnostic Data:    Labs:  Recent Labs   Lab 25  0515   WBC 9.0 8.8   HGB 10.5* 10.5*   MCV 96.8 97.1   .0* 117.0*   BAND 5 14       Recent Labs   Lab 25  0515 25  0500 25  0544 25  0541   * 136*  --   --   --    BUN 40* 33*  --   --   --    CREATSERUM 1.12* 0.91  0.91 0.99 0.87 0.98   CA 8.6* 8.5*  --   --   --    ALB  --   --  3.6  --   --    * 135*  --   --   --    K 4.5 4.5  --   --   --     107  --   --   --    CO2 21.0 19.0*  --   --   --    ALKPHO  --   --  101  --   --    AST  --   --  76*  --   --    ALT  --   --  90*  --   --    BILT  --   --  0.3  --   --     TP  --   --  5.9  --   --        Estimated Creatinine Clearance: 43.9 mL/min (based on SCr of 0.98 mg/dL).    No results for input(s): \"PTP\", \"INR\" in the last 168 hours.         COVID-19 Lab Results    COVID-19  Lab Results   Component Value Date    COVID19 Not Detected 05/20/2025    COVID19 Not Detected 10/25/2022    COVID19 Not Detected 10/20/2022       Pro-Calcitonin  No results for input(s): \"PCT\" in the last 168 hours.    Cardiac  No results for input(s): \"TROP\", \"PBNP\" in the last 168 hours.    Creatinine Kinase  No results for input(s): \"CK\" in the last 168 hours.    Inflammatory Markers  No results for input(s): \"CRP\", \"ERICK\", \"LDH\", \"DDIMER\" in the last 168 hours.      Imaging: Imaging data reviewed in Epic.    Medications: Scheduled Medications[1]    Assessment & Plan:     Sujey Medina is a 75 year old female with PMH sig for DL, HTN, CKD III, MDD, GERD, bladder ca stage iv w/ mets to brain sp craniotomy and frontal tumor resection presents to ED at [facility] w/ weakness, sob,  and falls.     Bacterial pneumonia  - Mild patchy airspace opacities noted on CTA chest although afebrile, on RA and no leukocytosis  - RVP neg, procal elevated; legionella Ag, strep pneumo Ag - neg  - Check MRSA nares  - ID consulted >> apprec recs >> cont iv abx as ordered  - Robitussin PRN  -fever spikes yest >> check labs, repeat blood cxs drawn, abx adjusted to andrae and vanco  -labs repeated dont show uptrend in wbc  -CTA chest neg for PE >> now with ground glass opacities which is new from previous imaging    Concern for subdural empyema near surgical resection cavity   Metastatic Urothelial Cell Carcinoma with mets to brain  - Recent left craniotomy for tumor resection on 4/16/25 with Dr. Daigle  - Follows with neurosurgery, oncology and radiation oncology outpatient  - On kera PTA >> continue  - Neurosurgery consulted >> seen by Dr Daigle >> no neurosurgical intervention indicated   - MRI brain w/ and w/o contrast to be  obtained from [facility] PACS or may consider repeating here if neuro status worsens  -dw dr bourgeois at bedside, no further work up now, and no surgical intervention needed at this time, fu in 2 wks  - trend fever  - cont iv abx as ordered  - ID following >> apprec recs  -PT/OT    Hypotension - likely orthostatic  -check orthostatic vitals q shift  -fluids if needed    Recurrent Falls  Generalized Weakness  Dizziness  - Suspect related to above   - No obvious focal neurological deficits  - CTH with no acute findings  - Check keppra level  - PT/OT assessment in progress >> will dw  regarding care at home vs rehab    HTN  DL  -stable  -resume hoome meds as able     GERD  -PPI     MDD  -resume home meds        Quality:  DVT Prophylaxis: lovenox, scds  CODE status: full code  If COVID testing is negative, may discontinue isolation: yes      DISPO:  Afebrile, tolerating po, no new complaints  Labile BP- has been most of hospitalization  Last dose of IV Abx today  Plan for [facility] when accepted- d/w patients    DC coordinator to help schedule appts     Notified RN of plan         DO Faye Donald Hospitalist       [1]    meropenem  500 mg Intravenous Q12H    vancomycin  15 mg/kg Intravenous Q24H    buPROPion SR  200 mg Oral Daily    busPIRone  5 mg Oral BID    famotidine  20 mg Oral Daily    metoprolol tartrate  25 mg Oral BID    atorvastatin  20 mg Oral Nightly    melatonin  3 mg Oral Nightly    heparin  5,000 Units Subcutaneous 2 times per day    levetiracetam  1,000 mg Oral BID

## 2025-05-24 NOTE — PLAN OF CARE
PT ALERT, ORIENTED X 2, 95% ON RA, RESP - 20, SHALLOW, DIMINISHED, IS , SR, , AFEBRILE, S/P INCISION TO LT HEAD, BRUISING TO FACE/ARMS, IV ANTIBIOTICS, UROSTOMY WITH YELLOW URINE, DENIES PAIN, GENERALIZED WEAKNESS, LABS IN AM, INSTRUCTED PT ON PLAN OF CARE, FALL PRECAUTIONS, BED ALARM ON    Problem: RESPIRATORY - ADULT  Goal: Achieves optimal ventilation and oxygenation  Description: INTERVENTIONS:  - Assess for changes in respiratory status  - Assess for changes in mentation and behavior  - Position to facilitate oxygenation and minimize respiratory effort  - Oxygen supplementation based on oxygen saturation or ABGs  - Provide Smoking Cessation handout, if applicable  - Encourage broncho-pulmonary hygiene including cough, deep breathe, Incentive Spirometry  - Assess the need for suctioning and perform as needed  - Assess and instruct to report SOB or any respiratory difficulty  - Respiratory Therapy support as indicated  - Manage/alleviate anxiety  - Monitor for signs/symptoms of CO2 retention  Outcome: Progressing

## 2025-05-24 NOTE — PLAN OF CARE
Assumed patient care 0730  Patient drowsy but easily arousable, oriented Xself-4, oriented more in afternoon/evening when more awake   On room air, BP low WDL, MD aware, NSR/ST on tele   Denies pain  Patient is 1-2 assist to turn side to side in bed, declining getting up to chair  Tolerating diet, poor appetite   Voiding per RLQ Urostomy, no BM this shift  IV ABX infusing per order   Seizure precautions in place   Waiting for placement at [facility]  Patient and spouse at bedside updated on plan of care     Problem: CARDIOVASCULAR - ADULT  Goal: Absence of cardiac arrhythmias or at baseline  Description: INTERVENTIONS:  - Continuous cardiac monitoring, monitor vital signs, obtain 12 lead EKG if indicated  - Evaluate effectiveness of antiarrhythmic and heart rate control medications as ordered  - Initiate emergency measures for life threatening arrhythmias  - Monitor electrolytes and administer replacement therapy as ordered  Outcome: Progressing     Problem: RESPIRATORY - ADULT  Goal: Achieves optimal ventilation and oxygenation  Description: INTERVENTIONS:  - Assess for changes in respiratory status  - Assess for changes in mentation and behavior  - Position to facilitate oxygenation and minimize respiratory effort  - Oxygen supplementation based on oxygen saturation or ABGs  - Provide Smoking Cessation handout, if applicable  - Encourage broncho-pulmonary hygiene including cough, deep breathe, Incentive Spirometry  - Assess the need for suctioning and perform as needed  - Assess and instruct to report SOB or any respiratory difficulty  - Respiratory Therapy support as indicated  - Manage/alleviate anxiety  - Monitor for signs/symptoms of CO2 retention  Outcome: Progressing

## 2025-05-25 LAB
CREAT BLD-MCNC: 0.92 MG/DL (ref 0.55–1.02)
EGFRCR SERPLBLD CKD-EPI 2021: 65 ML/MIN/1.73M2 (ref 60–?)

## 2025-05-25 PROCEDURE — 82565 ASSAY OF CREATININE: CPT | Performed by: STUDENT IN AN ORGANIZED HEALTH CARE EDUCATION/TRAINING PROGRAM

## 2025-05-25 NOTE — PROGRESS NOTES
[facility]   part of [facility] Hospitalist Progress Note     Sujey Medina Patient Status:  Inpatient    3/6/1950 MRN [id number]   Location [facility] 7NE-A Attending Kayleigh Mills MD   Hosp Day # 10 PCP Rajni Tiwari MD     Subjective:     No acute events.  at bedside. Patient denies acute complaints.     Objective:    Review of Systems:   10 point ROS completed and was negative, except for pertinent positive and negatives stated in subjective.    Vital signs:  Temp:  [97.4 °F (36.3 °C)-98.5 °F (36.9 °C)] 97.7 °F (36.5 °C)  Pulse:  [] 114  Resp:  [22-34] 28  BP: ()/(50-74) 106/66  SpO2:  [86 %-97 %] 86 %  HEENT: craniotomy scar visible, clean dry intact, staple.  Neck: No lymphadenopathy. No JVD.   Respiratory: Clear to auscultation bilaterally. No wheezes.   Cardiovascular: S1, S2. Regular rate and rhythm.  Chest and Back: No tenderness or deformity.  Abdomen: Soft, nontender, nondistended.    Neurologic: Answering questions, follows commands.   Musculoskeletal: Moves all extremities.  Extremities: No edema or cyanosis.  Integument: No rashes or lesions.   Psychiatric: Appropriate mood and affect.      Diagnostic Data:    Labs:  Recent Labs   Lab 25  0515   WBC 9.0 8.8   HGB 10.5* 10.5*   MCV 96.8 97.1   .0* 117.0*   BAND 5 14       Recent Labs   Lab 25  0515 25  0500 25  0544 25  0541 25  0559   * 136*  --   --   --   --    BUN 40* 33*  --   --   --   --    CREATSERUM 1.12* 0.91  0.91 0.99 0.87 0.98 0.92   CA 8.6* 8.5*  --   --   --   --    ALB  --   --  3.6  --   --   --    * 135*  --   --   --   --    K 4.5 4.5  --   --   --   --     107  --   --   --   --    CO2 21.0 19.0*  --   --   --   --    ALKPHO  --   --  101  --   --   --    AST  --   --  76*  --   --   --    ALT  --   --  90*  --   --   --    BILT  --   --  0.3  --   --   --    TP  --   --  5.9   --   --   --        Estimated Creatinine Clearance: 46.8 mL/min (based on SCr of 0.92 mg/dL).    No results for input(s): \"PTP\", \"INR\" in the last 168 hours.         COVID-19 Lab Results    COVID-19  Lab Results   Component Value Date    COVID19 Not Detected 05/20/2025    COVID19 Not Detected 10/25/2022    COVID19 Not Detected 10/20/2022       Pro-Calcitonin  No results for input(s): \"PCT\" in the last 168 hours.    Cardiac  No results for input(s): \"TROP\", \"PBNP\" in the last 168 hours.    Creatinine Kinase  No results for input(s): \"CK\" in the last 168 hours.    Inflammatory Markers  No results for input(s): \"CRP\", \"ERICK\", \"LDH\", \"DDIMER\" in the last 168 hours.      Imaging: Imaging data reviewed in Epic.    Medications: Scheduled Medications[1]    Assessment & Plan:     Sujey Medina is a 75 year old female with PMH sig for DL, HTN, CKD III, MDD, GERD, bladder ca stage iv w/ mets to brain sp craniotomy and frontal tumor resection presents to ED at [facility] w/ weakness, sob,  and falls.     Bacterial pneumonia  - Mild patchy airspace opacities noted on CTA chest although afebrile, on RA and no leukocytosis  - RVP neg, procal elevated; legionella Ag, strep pneumo Ag - neg  - Check MRSA nares  - ID consulted >> apprec recs >> cont iv abx as ordered  - Robitussin PRN  -fever spikes yest >> check labs, repeat blood cxs drawn, abx adjusted to andrae and vanco  -labs repeated dont show uptrend in wbc  -CTA chest neg for PE >> now with ground glass opacities which is new from previous imaging    Concern for subdural empyema near surgical resection cavity   Metastatic Urothelial Cell Carcinoma with mets to brain  - Recent left craniotomy for tumor resection on 4/16/25 with Dr. Daigle  - Follows with neurosurgery, oncology and radiation oncology outpatient  - On kera PTA >> continue  - Neurosurgery consulted >> seen by Dr Daigle >> no neurosurgical intervention indicated   - MRI brain w/ and w/o contrast to be obtained from  [facility] PACS or may consider repeating here if neuro status worsens  -dw dr bourgeois at bedside, no further work up now, and no surgical intervention needed at this time, fu in 2 wks  - trend fever  - cont iv abx as ordered  - ID following >> apprec recs  -PT/OT    Hypotension - likely orthostatic  -check orthostatic vitals q shift  -fluids if needed    Recurrent Falls  Generalized Weakness  Dizziness  - Suspect related to above   - No obvious focal neurological deficits  - CTH with no acute findings  - Check keppra level  - PT/OT assessment in progress >> will dw  regarding care at home vs rehab    HTN  DL  -stable  -resume hoome meds as able     GERD  -PPI     MDD  -resume home meds        Quality:  DVT Prophylaxis: lovenox, scds  CODE status: full code  If COVID testing is negative, may discontinue isolation: yes      DISPO:  Afebrile, tolerating po, no new complaints  BP improving, noted intermittent tachycardia; will resume home lopressor with monitoring, d/w RN     Plan for [facility] when accepted- d/w patients    DC coordinator to help schedule appts     Notified RN of plan         DO Faye Donald Hospitalist       [1]    metoprolol tartrate  12.5 mg Oral Once    buPROPion SR  200 mg Oral Daily    busPIRone  5 mg Oral BID    famotidine  20 mg Oral Daily    metoprolol tartrate  25 mg Oral BID    atorvastatin  20 mg Oral Nightly    melatonin  3 mg Oral Nightly    heparin  5,000 Units Subcutaneous 2 times per day    levetiracetam  1,000 mg Oral BID

## 2025-05-25 NOTE — PLAN OF CARE
A/O x2-4  VSS. RA  Tachypneic  Crani site MANDY, [facility]  Redness on bottom, mepilex in place  RLQ urostomy in place  Regular diet, poor appetite  Up w/ stand/pivot x2  Denies pain  Safety measures in place, call light w/in reach  Discussed plan of care

## 2025-05-25 NOTE — PLAN OF CARE
Assumed patient care 0730  Patient drowsy but easily arousable, oriented X4, more awake this shift   On room air, BP low WDL, MD aware, NSR/ST on tele   Lopressor held for low BP, MD notified, 1X dose given for tachycardia, monitor HR overnight   Denies pain  Patient is 1-2 assist to turn side to side in bed, declining getting up to chair  Tolerating diet, poor appetite   Voiding per RLQ Urostomy, no BM this shift  Seizure precautions in place   Waiting for placement at [facility] anticipate discharge tomorrow 11:30   Patient and spouse at bedside updated on plan of care    Problem: CARDIOVASCULAR - ADULT  Goal: Absence of cardiac arrhythmias or at baseline  Description: INTERVENTIONS:  - Continuous cardiac monitoring, monitor vital signs, obtain 12 lead EKG if indicated  - Evaluate effectiveness of antiarrhythmic and heart rate control medications as ordered  - Initiate emergency measures for life threatening arrhythmias  - Monitor electrolytes and administer replacement therapy as ordered  Outcome: Progressing     Problem: RESPIRATORY - ADULT  Goal: Achieves optimal ventilation and oxygenation  Description: INTERVENTIONS:  - Assess for changes in respiratory status  - Assess for changes in mentation and behavior  - Position to facilitate oxygenation and minimize respiratory effort  - Oxygen supplementation based on oxygen saturation or ABGs  - Provide Smoking Cessation handout, if applicable  - Encourage broncho-pulmonary hygiene including cough, deep breathe, Incentive Spirometry  - Assess the need for suctioning and perform as needed  - Assess and instruct to report SOB or any respiratory difficulty  - Respiratory Therapy support as indicated  - Manage/alleviate anxiety  - Monitor for signs/symptoms of CO2 retention  Outcome: Progressing

## 2025-05-26 LAB
CREAT BLD-MCNC: 0.9 MG/DL (ref 0.55–1.02)
EGFRCR SERPLBLD CKD-EPI 2021: 66 ML/MIN/1.73M2 (ref 60–?)

## 2025-05-26 PROCEDURE — 82565 ASSAY OF CREATININE: CPT | Performed by: STUDENT IN AN ORGANIZED HEALTH CARE EDUCATION/TRAINING PROGRAM

## 2025-05-26 NOTE — PROGRESS NOTES
05/26/25 0906 05/26/25 0908 05/26/25 0912   Vital Signs   Pulse 105 104 100   Resp (!) 29 (!) 33 (!) 35   BP 95/63 93/69 94/63   MAP (mmHg) 75 78 70   BP Location Right arm Right arm Right arm   BP Method Automatic Automatic Automatic   Patient Position Lying Sitting Standing   Oxygen Therapy   SpO2 91 % 91 % 91 %   O2 Device Nasal cannula Nasal cannula Nasal cannula   O2 Flow Rate (L/min) 2 L/min 2 L/min 2 L/min

## 2025-05-26 NOTE — PLAN OF CARE
Assumed patient care 0730  Patient drowsy but easily arousable, oriented X4, more awake this shift   On room air-2L NC with activity, BP low WDL, MD aware, NSR/ST on tele   Orthostatic BPs complete, see flowsheets   Denies pain  Patient is up to chair with 1-2 assist and Sharri Steady, tolerated well, desats with activity but recovers quickly   Tolerating diet, poor appetite   Voiding per RLQ Urostomy, no BM this shift  Seizure precautions in place   Waiting for placement at [facility] anticipate discharge tomorrow  Patient and spouse at bedside updated on plan of care    Problem: CARDIOVASCULAR - ADULT  Goal: Absence of cardiac arrhythmias or at baseline  Description: INTERVENTIONS:  - Continuous cardiac monitoring, monitor vital signs, obtain 12 lead EKG if indicated  - Evaluate effectiveness of antiarrhythmic and heart rate control medications as ordered  - Initiate emergency measures for life threatening arrhythmias  - Monitor electrolytes and administer replacement therapy as ordered  Outcome: Progressing     Problem: RESPIRATORY - ADULT  Goal: Achieves optimal ventilation and oxygenation  Description: INTERVENTIONS:  - Assess for changes in respiratory status  - Assess for changes in mentation and behavior  - Position to facilitate oxygenation and minimize respiratory effort  - Oxygen supplementation based on oxygen saturation or ABGs  - Provide Smoking Cessation handout, if applicable  - Encourage broncho-pulmonary hygiene including cough, deep breathe, Incentive Spirometry  - Assess the need for suctioning and perform as needed  - Assess and instruct to report SOB or any respiratory difficulty  - Respiratory Therapy support as indicated  - Manage/alleviate anxiety  - Monitor for signs/symptoms of CO2 retention  Outcome: Progressing

## 2025-05-26 NOTE — PROGRESS NOTES
[facility]   part of [facility] Hospitalist Progress Note     Sujey Medina Patient Status:  Inpatient    3/6/1950 MRN [id number]   Location [facility] 7NE-A Attending Kayleigh Mills MD   Hosp Day # 11 PCP Rajni Tiwari MD     Subjective:     No acute events. Sitting in bedside recliner. Some dyspnea, but on room air. No new complaints.    Objective:    Review of Systems:   10 point ROS completed and was negative, except for pertinent positive and negatives stated in subjective.    Vital signs:  Temp:  [97.3 °F (36.3 °C)-97.8 °F (36.6 °C)] 97.5 °F (36.4 °C)  Pulse:  [] 100  Resp:  [20-35] 35  BP: ()/(63-70) 94/63  SpO2:  [86 %-98 %] 92 %  HEENT: craniotomy scar visible, clean dry intact, staple.  Neck: No lymphadenopathy. No JVD.   Respiratory: Clear to auscultation bilaterally. No wheezes.   Cardiovascular: S1, S2. Regular rate and rhythm.  Chest and Back: No tenderness or deformity.  Abdomen: Soft, nontender, nondistended.    Neurologic: Answering questions, follows commands.   Musculoskeletal: Moves all extremities.  Extremities: No edema or cyanosis.  Integument: No rashes or lesions.   Psychiatric: Appropriate mood and affect.      Diagnostic Data:    Labs:  Recent Labs   Lab 25  0515   WBC 9.0 8.8   HGB 10.5* 10.5*   MCV 96.8 97.1   .0* 117.0*   BAND 5 14       Recent Labs   Lab 25  0515 25  0500 25  0544 25  0541 25  0559 25  0529   * 136*  --   --   --   --   --    BUN 40* 33*  --   --   --   --   --    CREATSERUM 1.12* 0.91  0.91 0.99   < > 0.98 0.92 0.90   CA 8.6* 8.5*  --   --   --   --   --    ALB  --   --  3.6  --   --   --   --    * 135*  --   --   --   --   --    K 4.5 4.5  --   --   --   --   --     107  --   --   --   --   --    CO2 21.0 19.0*  --   --   --   --   --    ALKPHO  --   --  101  --   --   --   --    AST  --   --  76*  --   --   --    --    ALT  --   --  90*  --   --   --   --    BILT  --   --  0.3  --   --   --   --    TP  --   --  5.9  --   --   --   --     < > = values in this interval not displayed.       Estimated Creatinine Clearance: 47.9 mL/min (based on SCr of 0.9 mg/dL).    No results for input(s): \"PTP\", \"INR\" in the last 168 hours.         COVID-19 Lab Results    COVID-19  Lab Results   Component Value Date    COVID19 Not Detected 05/20/2025    COVID19 Not Detected 10/25/2022    COVID19 Not Detected 10/20/2022       Pro-Calcitonin  No results for input(s): \"PCT\" in the last 168 hours.    Cardiac  No results for input(s): \"TROP\", \"PBNP\" in the last 168 hours.    Creatinine Kinase  No results for input(s): \"CK\" in the last 168 hours.    Inflammatory Markers  No results for input(s): \"CRP\", \"ERICK\", \"LDH\", \"DDIMER\" in the last 168 hours.      Imaging: Imaging data reviewed in Epic.    Medications: Scheduled Medications[1]    Assessment & Plan:     Sujey Medina is a 75 year old female with PMH sig for DL, HTN, CKD III, MDD, GERD, bladder ca stage iv w/ mets to brain sp craniotomy and frontal tumor resection presents to ED at [facility] w/ weakness, sob,  and falls.     Bacterial pneumonia  - Mild patchy airspace opacities noted on CTA chest although afebrile, on RA and no leukocytosis  - RVP neg, procal elevated; legionella Ag, strep pneumo Ag - neg  - Check MRSA nares  - ID consulted >> apprec recs >> cont iv abx as ordered  - Robitussin PRN  -fever spikes yest >> check labs, repeat blood cxs drawn, abx adjusted to andrae and vanco  -labs repeated dont show uptrend in wbc  -CTA chest neg for PE >> now with ground glass opacities which is new from previous imaging    Concern for subdural empyema near surgical resection cavity   Metastatic Urothelial Cell Carcinoma with mets to brain  - Recent left craniotomy for tumor resection on 4/16/25 with Dr. Daigle  - Follows with neurosurgery, oncology and radiation oncology outpatient  - On [facility]  >> continue  - Neurosurgery consulted >> seen by Dr Daigle >> no neurosurgical intervention indicated   - MRI brain w/ and w/o contrast to be obtained from [facility] PACS or may consider repeating here if neuro status worsens  -dw dr bourgeois at bedside, no further work up now, and no surgical intervention needed at this time, fu in 2 wks  - trend fever  - cont iv abx as ordered  - ID following >> apprec recs  -PT/OT    Hypotension - likely orthostatic  -check orthostatic vitals q shift  -fluids if needed    Recurrent Falls  Generalized Weakness  Dizziness  - Suspect related to above   - No obvious focal neurological deficits  - CTH with no acute findings  - Check keppra level  - PT/OT assessment in progress >> will dw  regarding care at home vs rehab    HTN  DL  -stable  -resume hoome meds as able     GERD  -PPI     MDD  -resume home meds        Quality:  DVT Prophylaxis: lovenox, scds  CODE status: full code  If COVID testing is negative, may discontinue isolation: yes      DISPO:  Afebrile, tolerating po, no new complaints  Tachycardia improved, tolerating lopressor    Plan for [facility] when accepted- d/w patients    DC coordinator to help schedule appts     Notified RN of plan         DO Faye Donald Hospitalist       [1]    buPROPion SR  200 mg Oral Daily    busPIRone  5 mg Oral BID    famotidine  20 mg Oral Daily    metoprolol tartrate  25 mg Oral BID    atorvastatin  20 mg Oral Nightly    melatonin  3 mg Oral Nightly    heparin  5,000 Units Subcutaneous 2 times per day    levetiracetam  1,000 mg Oral BID

## 2025-05-26 NOTE — PLAN OF CARE
Received pt at 1930  Pt AOx3, ST/SR, RA, VSS  2L NC applied  (+) BM  Voiding urostomy  Mepilex to sacrum  D/c Planning: Anticipate d/c to the [facility] in [place] once medically cleared  Call light within reach.  Needs currently met      Problem: Patient/Family Goals  Goal: Patient/Family Long Term Goal  Description: Patient's Long Term Goal: Discharge home when medically stable.    Interventions:  Telemetry monitoring  O2 protocol,   Fall precaution, bed alarm on, non-skid socks on,  call light and bedside table within reach.  Monitor vitals, labs, intake and output.  Electrolyte protocol.  Heparin and SCD for VTE.  Nebs prn  Monitor urostomy output.  IV antibiotics.  Physical therapy evaluation and treat.     - See additional Care Plan goals for specific interventions  Outcome: Progressing  Goal: Patient/Family Short Term Goal  Description: Patient's Short Term Goal: Vital signs stable this shift    Interventions:   Telemetry monitoring  O2 protocol,   Fall precaution, bed alarm on, non-skid socks on,  call light and bedside table within reach.  Monitor vitals, labs, intake and output.  Electrolyte protocol.  Heparin and SCD for VTE.  Nebs prn  Monitor urostomy output.  IV antibiotics.  Physical therapy evaluation and treat.     - See additional Care Plan goals for specific interventions  Outcome: Progressing     Problem: CARDIOVASCULAR - ADULT  Goal: Absence of cardiac arrhythmias or at baseline  Description: INTERVENTIONS:  - Continuous cardiac monitoring, monitor vital signs, obtain 12 lead EKG if indicated  - Evaluate effectiveness of antiarrhythmic and heart rate control medications as ordered  - Initiate emergency measures for life threatening arrhythmias  - Monitor electrolytes and administer replacement therapy as ordered  Outcome: Progressing     Problem: RESPIRATORY - ADULT  Goal: Achieves optimal ventilation and oxygenation  Description: INTERVENTIONS:  - Assess for changes in respiratory  status  - Assess for changes in mentation and behavior  - Position to facilitate oxygenation and minimize respiratory effort  - Oxygen supplementation based on oxygen saturation or ABGs  - Provide Smoking Cessation handout, if applicable  - Encourage broncho-pulmonary hygiene including cough, deep breathe, Incentive Spirometry  - Assess the need for suctioning and perform as needed  - Assess and instruct to report SOB or any respiratory difficulty  - Respiratory Therapy support as indicated  - Manage/alleviate anxiety  - Monitor for signs/symptoms of CO2 retention  Outcome: Progressing

## 2025-05-27 VITALS
SYSTOLIC BLOOD PRESSURE: 96 MMHG | HEART RATE: 99 BPM | TEMPERATURE: 97 F | WEIGHT: 140 LBS | BODY MASS INDEX: 23 KG/M2 | OXYGEN SATURATION: 94 % | DIASTOLIC BLOOD PRESSURE: 60 MMHG | RESPIRATION RATE: 32 BRPM

## 2025-05-27 LAB
CREAT BLD-MCNC: 0.79 MG/DL (ref 0.55–1.02)
EGFRCR SERPLBLD CKD-EPI 2021: 77 ML/MIN/1.73M2 (ref 60–?)

## 2025-05-27 PROCEDURE — 97530 THERAPEUTIC ACTIVITIES: CPT

## 2025-05-27 PROCEDURE — 82565 ASSAY OF CREATININE: CPT | Performed by: STUDENT IN AN ORGANIZED HEALTH CARE EDUCATION/TRAINING PROGRAM

## 2025-05-27 PROCEDURE — 97535 SELF CARE MNGMENT TRAINING: CPT

## 2025-05-27 NOTE — DIETARY NOTE
[facility]   part of [facility]    NUTRITION ASSESSMENT    Pt does not meet malnutrition criteria at this time.      NUTRITION INTERVENTION:    Meal and Snacks - Monitor and encourage adequate PO intake.   Medical Food Supplements - Magic Cup BID. Rationale/use for oral supplements discussed.    PATIENT STATUS:   5/27 - Pt lying in bed, no family at bedside.  Said she is eating OK, but is tired of the food in the hospital.  Pt denies n/v/d .+BM 5/26 (soft).  PO 25-50% of meals.  Dislikes Ensure, but tolerating Magic Cup.  Discontinue Ensure and increase MC to BID.  Encouraged PO at all meals. Pt has no questions - awaiting insurance auth for rehab    05/21/25 75/F admitted with subdural empyema. S/p Craniotomy and frontal tumor resection.  PMH includes Bladder CA, HTN, CKD, GERD.  Pt seen for consult \"poor appetite\".  Pt lying in bed, no family at bedside.  Pt reports she ate lunch, but is unable to recall what she ate.  She denies n/v/d. Last BM 5/19. Pt reports UBW of 150#, but per EMR wt is relatively stable.  No obvious wasting noted per visual exam.  NFKA.  Offered ONS to support until PO improves, pt agreeable      ANTHROPOMETRICS:  Ht:  5'5\"  Wt: 63.5 kg (139 lb 15.9 oz).   BMI: Body mass index is 23.3 kg/m².  IBW: 56.8 kg      WEIGHT HISTORY:   Weight loss: No    Wt Readings from Last 10 Encounters:   05/15/25 63.5 kg (139 lb 15.9 oz)   05/12/25 63.5 kg (140 lb)   04/18/25 67.6 kg (149 lb 0.5 oz)   04/16/25 66.2 kg (145 lb 15.1 oz)   11/02/22 56.4 kg (124 lb 6.4 oz)   10/22/22 56 kg (123 lb 7.3 oz)   10/22/22 56 kg (123 lb 7.3 oz)   10/19/22 61.2 kg (135 lb)   08/29/22 62.6 kg (138 lb)   12/13/21 63.2 kg (139 lb 6 oz)        NUTRITION:  Diet:       Procedures    Regular/General diet Is Patient on Accuchecks? No      Food Allergies: No  Cultural/Ethnic/[place]atsdin Preferences Addressed: Yes    Percent Meals Eaten (last 3 days)       Date/Time Percent Meals Eaten (%)    05/24/25 0900 25 %    05/24/25  1730 25 %    05/25/25 0800 50 %    05/25/25 1415 25 %     Percent Meals Eaten (%): 3 [employer] at 05/25/25 1415    05/26/25 0930 25 %    05/27/25 0945 30 %            GI system review: anorexia Last BM Date: 05/26/25  Skin and wounds: surgical wound to head, skin tear to arm    NUTRITION RELATED PHYSICAL FINDINGS:     1. Body Fat/Muscle Mass: no wasting noted      2. Fluid Accumulation: none per RN documentation    NUTRITION PRESCRIPTION: 63.5 kg Actual Body Weight  Calories: 5013-8715 calories/day (25-30 kcal/kg)  Protein: 64-95 grams protein/day (1.2-1.5 grams protein per kg)  Fluid: ~1 ml/kcal or per MD discretion    NUTRITION DIAGNOSIS/PROBLEM:  Inadequate oral intake related to insufficient appetite resulting in inadequate nutrition intake as evidenced by documented/reported insufficient oral intake      MONITOR AND EVALUATE/NUTRITION GOALS:  PO intake of 75% of meals TID - Not met, Continues  PO intake of 75% of oral nutrition supplement/s - Not met, Continues  Weight stable within 1 to 2 lbs during admission - Ongoing      MEDICATIONS:  Pepcid,     LABS:  reviewed    Pt is at Moderate nutrition risk      Nikki Joy RD, LDN, [facility]  Clinical Dietitian  Phone [phone]

## 2025-05-27 NOTE — PROGRESS NOTES
[facility]   part of [facility] Hospitalist Progress Note     Sujey Medina Patient Status:  Inpatient    3/6/1950 MRN [id number]   Location [facility] 7NE-A Attending Kayleigh Mills MD   Hosp Day # 12 PCP Rajni Tiwari MD     Subjective:     No acute events. Remains on room air. Tolerating po.     Objective:    Review of Systems:   10 point ROS completed and was negative, except for pertinent positive and negatives stated in subjective.    Vital signs:  Temp:  [97.3 °F (36.3 °C)-98.5 °F (36.9 °C)] 97.7 °F (36.5 °C)  Pulse:  [] 88  Resp:  [20-33] 20  BP: ()/(60-69) 99/60  SpO2:  [93 %-96 %] 95 %  HEENT: craniotomy scar visible, clean dry intact, staple.  Neck: No lymphadenopathy. No JVD.   Respiratory: Clear to auscultation bilaterally. No wheezes.   Cardiovascular: S1, S2. Regular rate and rhythm.  Chest and Back: No tenderness or deformity.  Abdomen: Soft, nontender, nondistended.    Neurologic: Answering questions, follows commands.   Musculoskeletal: Moves all extremities.  Extremities: No edema or cyanosis.  Integument: No rashes or lesions.   Psychiatric: Appropriate mood and affect.      Diagnostic Data:    Labs:  Recent Labs   Lab 25  0515   WBC 9.0 8.8   HGB 10.5* 10.5*   MCV 96.8 97.1   .0* 117.0*   BAND 5 14       Recent Labs   Lab 25  0515 25  0500 25  0544 25  0559 25  0529 25  0532   * 136*  --   --   --   --   --    BUN 40* 33*  --   --   --   --   --    CREATSERUM 1.12* 0.91  0.91 0.99   < > 0.92 0.90 0.79   CA 8.6* 8.5*  --   --   --   --   --    ALB  --   --  3.6  --   --   --   --    * 135*  --   --   --   --   --    K 4.5 4.5  --   --   --   --   --     107  --   --   --   --   --    CO2 21.0 19.0*  --   --   --   --   --    ALKPHO  --   --  101  --   --   --   --    AST  --   --  76*  --   --   --   --    ALT  --   --  90*  --   --   --   --     BILT  --   --  0.3  --   --   --   --    TP  --   --  5.9  --   --   --   --     < > = values in this interval not displayed.       Estimated Creatinine Clearance: 54.5 mL/min (based on SCr of 0.79 mg/dL).    No results for input(s): \"PTP\", \"INR\" in the last 168 hours.         COVID-19 Lab Results    COVID-19  Lab Results   Component Value Date    COVID19 Not Detected 05/20/2025    COVID19 Not Detected 10/25/2022    COVID19 Not Detected 10/20/2022       Pro-Calcitonin  No results for input(s): \"PCT\" in the last 168 hours.    Cardiac  No results for input(s): \"TROP\", \"PBNP\" in the last 168 hours.    Creatinine Kinase  No results for input(s): \"CK\" in the last 168 hours.    Inflammatory Markers  No results for input(s): \"CRP\", \"ERICK\", \"LDH\", \"DDIMER\" in the last 168 hours.      Imaging: Imaging data reviewed in Epic.    Medications: Scheduled Medications[1]    Assessment & Plan:     Sujey Medina is a 75 year old female with PMH sig for DL, HTN, CKD III, MDD, GERD, bladder ca stage iv w/ mets to brain sp craniotomy and frontal tumor resection presents to ED at [facility] w/ weakness, sob,  and falls.     Bacterial pneumonia  - Mild patchy airspace opacities noted on CTA chest although afebrile, on RA and no leukocytosis  - RVP neg, procal elevated; legionella Ag, strep pneumo Ag - neg  - Check MRSA nares  - ID consulted >> apprec recs >> cont iv abx as ordered  - Robitussin PRN  -fever spikes yest >> check labs, repeat blood cxs drawn, abx adjusted to andrae and vanco  -labs repeated dont show uptrend in wbc  -CTA chest neg for PE >> now with ground glass opacities which is new from previous imaging    Concern for subdural empyema near surgical resection cavity   Metastatic Urothelial Cell Carcinoma with mets to brain  - Recent left craniotomy for tumor resection on 4/16/25 with Dr. Daigle  - Follows with neurosurgery, oncology and radiation oncology outpatient  - On keppra PTA >> continue  - Neurosurgery consulted >>  seen by Dr Daigle >> no neurosurgical intervention indicated   - MRI brain w/ and w/o contrast to be obtained from [facility] PACS or may consider repeating here if neuro status worsens  -dw dr bourgeois at bedside, no further work up now, and no surgical intervention needed at this time, fu in 2 wks  - trend fever  - cont iv abx as ordered  - ID following >> apprec recs  -PT/OT    Hypotension - likely orthostatic  -check orthostatic vitals q shift  -fluids if needed    Recurrent Falls  Generalized Weakness  Dizziness  - Suspect related to above   - No obvious focal neurological deficits  - CTH with no acute findings  - Check keppra level  - PT/OT assessment in progress >> will dw  regarding care at home vs rehab    HTN  DL  -stable  -resume hoome meds as able     GERD  -PPI     MDD  -resume home meds        Quality:  DVT Prophylaxis: lovenox, scds  CODE status: full code  If COVID testing is negative, may discontinue isolation: yes      DISPO:  Afebrile, tolerating po, no new complaints    Plan for [facility] when accepted- d/w patients    DC coordinator to help schedule appts     Notified RN of plan         DO Faye Donald Hospitalist       [1]    buPROPion SR  200 mg Oral Daily    busPIRone  5 mg Oral BID    famotidine  20 mg Oral Daily    metoprolol tartrate  25 mg Oral BID    atorvastatin  20 mg Oral Nightly    melatonin  3 mg Oral Nightly    heparin  5,000 Units Subcutaneous 2 times per day    levetiracetam  1,000 mg Oral BID

## 2025-05-27 NOTE — PLAN OF CARE
Received pt at 1930  Pt AOx3, ST/SR, RA, VSS  2L NC PRN w/ activity  (+) BM  Voiding via urostomy  Mepilex to sacrum  D/c Planning: Anticipate d/c to the [facility] in [place] pending auth 5/27?  Call light within reach.  Needs currently met      Problem: Patient/Family Goals  Goal: Patient/Family Long Term Goal  Description: Patient's Long Term Goal: Discharge home when medically stable.    Interventions:  Telemetry monitoring  O2 protocol,   Fall precaution, bed alarm on, non-skid socks on,  call light and bedside table within reach.  Monitor vitals, labs, intake and output.  Electrolyte protocol.  Heparin and SCD for VTE.  Nebs prn  Monitor urostomy output.  IV antibiotics.  Physical therapy evaluation and treat.     - See additional Care Plan goals for specific interventions  Outcome: Progressing  Goal: Patient/Family Short Term Goal  Description: Patient's Short Term Goal: Vital signs stable this shift    Interventions:   Telemetry monitoring  O2 protocol,   Fall precaution, bed alarm on, non-skid socks on,  call light and bedside table within reach.  Monitor vitals, labs, intake and output.  Electrolyte protocol.  Heparin and SCD for VTE.  Nebs prn  Monitor urostomy output.  IV antibiotics.  Physical therapy evaluation and treat.     - See additional Care Plan goals for specific interventions  Outcome: Progressing     Problem: CARDIOVASCULAR - ADULT  Goal: Absence of cardiac arrhythmias or at baseline  Description: INTERVENTIONS:  - Continuous cardiac monitoring, monitor vital signs, obtain 12 lead EKG if indicated  - Evaluate effectiveness of antiarrhythmic and heart rate control medications as ordered  - Initiate emergency measures for life threatening arrhythmias  - Monitor electrolytes and administer replacement therapy as ordered  Outcome: Progressing     Problem: RESPIRATORY - ADULT  Goal: Achieves optimal ventilation and oxygenation  Description: INTERVENTIONS:  - Assess for changes in  respiratory status  - Assess for changes in mentation and behavior  - Position to facilitate oxygenation and minimize respiratory effort  - Oxygen supplementation based on oxygen saturation or ABGs  - Provide Smoking Cessation handout, if applicable  - Encourage broncho-pulmonary hygiene including cough, deep breathe, Incentive Spirometry  - Assess the need for suctioning and perform as needed  - Assess and instruct to report SOB or any respiratory difficulty  - Respiratory Therapy support as indicated  - Manage/alleviate anxiety  - Monitor for signs/symptoms of CO2 retention  Outcome: Progressing

## 2025-05-27 NOTE — OCCUPATIONAL THERAPY NOTE
OCCUPATIONAL THERAPY TREATMENT NOTE - INPATIENT     Room Number: 7600/7600-A  Session: 1   Number of Visits to Meet Established Goals: 7    Presenting Problem: Frequent falls    HOME SITUATION  Type of Home: Condo  Home Layout: One level, Elevator  Lives With: Spouse     Toilet and Equipment: Standard height toilet  Shower/Tub and Equipment: Tub-shower combo     Drives: Yes  Patient Regularly Uses: None     Prior Level of Function: mod indep, frequent falls at home    ASSESSMENT   Patient demonstrates limited progress this session, goals remain in progress.    Patient continues to function below baseline with all ADL.   Contributing factors to remaining limitations include decreased endurance.  Next session anticipate patient to progress toileting, upper body dressing, and lower body dressing.  Occupational Therapy will continue to follow patient for duration of hospitalization.    Patient continues to benefit from continued skilled OT services: at discharge to promote prior level of function and safety with additional support and return home with home health OT. Limited activity endurance.      History: macario is a 75 year old female admitted on 5/15/2025 with Presenting Problem: Frequent falls. 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  Pt with mets to the lungs s/p bronch, R VATS with RML lobectomy on 1/24/2024.     TREATMENT SESSION:  Patient Start of Session: supine    FUNCTIONAL TRANSFER ASSESSMENT  Sit to Stand: Chair  Edge of Bed: Contact Guard Assist  Chair: Minimal Assist  Commode Transfer: Not Tested    BED MOBILITY  Supine to Sit : Minimal Assist     FUNCTIONAL ADL ASSESSMENT  LB Dressing Seated: Not Tested  Toileting Standing: Not Tested    EDUCATION PROVIDED  Patient Education : Role of Occupational Therapy; Plan of Care; Posture/Positioning;  Energy Conservation; Proper Body Mechanics  Patient's Response to Education: Verbalized Understanding    Equipment used: rw    Therapist comments: room air 96%.  Supine to sit min A.  Supervision dynamic sitting balance.  Frequent rest between each task.   Cga from edge of bed with RW and to take steps to the chair.  2 liters for activity.  Pt commented, \"I am not walking, I need to rest,\" when PT encouraged the pt to ambulate in hallway.   Education provided on pursed lip breathing.  96% still with 2 liters.   Once in the hallway, pt stood from the chair, min A.  Pt sat back down.  PT and OT asked the patient about her goals.  Patient replied, \"I wan to to walk.\" Limited progress secondary to limited activity endurance. Needing encouragement.     Patient End of Session: Up in chair, Needs met, Call light within reach, All patient questions and concerns addressed    PAIN ASSESSMENT  Ratin           OBJECTIVE  Precautions: Bed/chair alarm    AM-PAC ‘6-Clicks’ Inpatient Daily Activity Short Form  -   Putting on and taking off regular lower body clothing?: A Lot  -   Bathing (including washing, rinsing, drying)?: A Lot  -   Toileting, which includes using toilet, bedpan or urinal? : A Little  -   Putting on and taking off regular upper body clothing?: A Little  -   Taking care of personal grooming such as brushing teeth?: A Little  -   Eating meals?: None    AM-PAC Score:  Score: 17  Approx Degree of Impairment: 50.11%  Standardized Score (AM-PAC Scale): 37.26    PLAN  OT Device Recommendations: Shower chair  OT Treatment Plan: Balance activities, ADL training, UE strengthening/ROM, Functional transfer training, Patient/Family training, Patient/Family education, Compensatory technique education  Rehab Potential : Fair  Frequency: 3-5x/week    OT Goals:   Ongoing   ADL Goals   Patient will perform lower body dressing:  with supervision  Patient will perform toileting: with supervision     Functional Transfer  Goals  Patient will transfer to toilet:  with supervision    OT Session Time: 23 minutes  Self-Care Home Management: 8 minutes  Therapeutic Activity: 12 minutes

## 2025-05-27 NOTE — PLAN OF CARE
Assumed care at 0730  A&Ox4, VSS, up with sera steady   No complaints of pain   Tolerating diet   PT/OT eval complete   R urostomy in tact   Patient and family updated on POC   Report called to the [facility] RN- all questions answered       NURSING DISCHARGE NOTE    Discharged Rehab facility via Ambulance.  Accompanied by Support staff  Belongings Taken by patient/family.

## 2025-05-27 NOTE — PHYSICAL THERAPY NOTE
PHYSICAL THERAPY TREATMENT NOTE - INPATIENT    Room Number: 7600/7600-A     Session: 3     Number of Visits to Meet Established Goals: 5  History related to current admission: Patient is a 75 year old female admitted on 5/15/2025: Presents with inc weakness, dizziness and falls in the setting of L frontal crani for brain mets/tumor resection 4/16/25. Dx concern for subdural empyema near surgical cavity. Additionally with dyspnea and concern for PNA     [facility] 5/14-5/15/25: SOB  ED 5/12: fall   4/10-4/20/25: L frontal lobe mass with vasogenic edema s/p /p left image guided craniotaomy for brain mass on 4/16 > [facility]     HOME SITUATION  Type of Home: Condo  Home Layout: One level, Elevator                     Lives With: Spouse    Drives: Yes   Patient Regularly Uses: None       Prior Level of [place]: pt/spouse report multiple falls at home since previous admit, reports compliance with use of RW, states sometimes her legs give out or she trips  seemingly more with the LLE than the R     Presenting Problem: falls  Co-Morbidities : DL, HTN, CKD III, MDD, GERD, 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  Pt with mets to the lungs s/p bronch, R VATS with RML lobectomy on 1/24/2024.    PHYSICAL THERAPY ASSESSMENT   Patient demonstrates limited progress this session, goals  updated to reflect patient performance.  Pt demonstrates decline in mobility compared to previous sessions due to continued elevated RR.     Patient is requiring contact guard assist as a result of the following impairments: decreased endurance/aerobic capacity, decreased muscular endurance, and medical status.     Patient continues to function below baseline with gait.  Next session anticipate patient to progress gait.  Physical Therapy will continue to follow patient for duration of hospitalization.    Patient continues to benefit from  continued skilled PT services: .    PLAN DURING HOSPITALIZATION  Nursing Mobility Recommendation : 1 Assist  PT Device Recommendation: Rolling walker  PT Treatment Plan: Bed mobility, Body mechanics, Coordination, Endurance, Energy conservation, Patient education, Family education, Gait training, Range of motion, Neuromuscular re-educate, Strengthening, Transfer training, Balance training  Frequency (Obs): 3-5x/week     CURRENT GOALS     Goal #1 Patient is able to demonstrate supine - sit EOB @ level: supervision      Goal #2 Patient is able to demonstrate transfers EOB to/from Chair/Wheelchair at assistance level: supervision      Goal #3 Patient is able to ambulate 150 feet with assist device: walker - rolling at assistance level: supervision   MET  > progress to 300   Goal #4     Goal #5     Goal #6     Goal Comments: Goals established on 2025 all goals ongoing     SUBJECTIVE  \" I just want to be able to breathe\"    OBJECTIVE  Precautions: Bed/chair alarm    WEIGHT BEARING RESTRICTION     PAIN ASSESSMENT   Ratin  Location: pt denies       BALANCE                                                                                                                       Static Sitting: Good  Dynamic Sitting: Good           Static Standing: Fair -  Dynamic Standing: Fair -    ACTIVITY TOLERANCE             Resp:  (20-30s at rest,)  BP: 99/60   BP Location: Right arm  BP Method: Automatic  Patient Position: Sitting    O2 WALK  Oxygen Therapy  SPO2% on Room Air at Rest: 94  SPO2% Ambulation on Oxygen: 96  Ambulation oxygen flow (liters per minute): 2    AM-PAC '6-Clicks' INPATIENT SHORT FORM - BASIC MOBILITY  How much difficulty does the patient currently have...  Patient Difficulty: Turning over in bed (including adjusting bedclothes, sheets and blankets)?: None   Patient Difficulty: Sitting down on and standing up from a chair with arms (e.g., wheelchair, bedside commode, etc.): A Little   Patient  Difficulty: Moving from lying on back to sitting on the side of the bed?: None   How much help from another person does the patient currently need...   Help from Another: Moving to and from a bed to a chair (including a wheelchair)?: A Little   Help from Another: Need to walk in hospital room?: A Little   Help from Another: Climbing 3-5 steps with a railing?: A Lot     AM-PAC Score:  Raw Score: 19   Approx Degree of Impairment: 41.77%   Standardized Score (AM-PAC Scale): 45.44   CMS Modifier (G-Code): CK    FUNCTIONAL ABILITY STATUS  Gait Assessment   Functional Mobility/Gait Assessment  Gait Assistance: Contact guard assist  Distance (ft): 1  Assistive Device: Rolling walker  Pattern: Shuffle    Skilled Therapy Provided    Bed Mobility:  Rolling: mod I    Supine<>Sit: SBA, inc time   Sit<>Supine: NT     Transfer Mobility:  Sit<>Stand: CGA - vc for BUE on bed/bedside chair to push off for leverage   Stand<>Sit: CGA   Gait: CGA to chair only.     Therapist's Comments: Discussed case with RN prior to session initiation. Pt agreeable to participation in therapy. Gait belt donned for out of bed mobility. Continues to demonstrate elevated RR with minimal activity and report shortness of breath not correlated with O2 sats (on 2L throughout session with sats > 90 as assessed). Implemented EC techniques including pursed lip breathing with minimal success.  Unable to participate in gait training 2/2 above today.       Patient End of Session: Up in chair, Needs met, Call light within reach, RN aware of session/findings, All patient questions and concerns addressed, Hospital anti-slip socks, Alarm set    PT Session Time: 23 minutes  Gait Training: minutes  Therapeutic Activity: 23  minutes  Therapeutic Exercise:  minutes   Neuromuscular Re-education:  minutes

## 2025-05-27 NOTE — CM/SW NOTE
05/18/25 0900   CM/SW Referral Data   Referral Source Nurse   Reason for Referral Other  (Home Health Services)   Informant EMR   Patient Status Prior to Admission   Services in place prior to admission Home Health Care   [facility] Health Provider Info [facility]   Discharge Needs   Anticipated D/C needs To be determined     ALNIE received order for Home Health Services. Noted PT eval pending.     Chart reviewed and noted pt was recently admitted 4/10-4/20 and discharged home with [facility].   SW spoke with [facility] liaison Shani who stated pt is current with [facility] for RN and PT services.  GONZALES entered for [facility].     Await clinical course and anticipated needs from therapy team. SW/CM to remain available for support and/or discharge planning.    NIYA Rice  Discharge Planner  
   05/27/25 1400   Discharge disposition   Expected discharge disposition subacute   Post Acute Care Provider [facility]   Discharge transportation [facility]     PASRR level 2 uploaded. Ambulance scheduled for 4:00 PM, PCS completed. Spouse made aware by RN.     RN to call [place] at [facility] at [phone] for report.     NIYA Nguyen      
Addendum: ambulance placed on will call for Monday as DC has not been confirmed with [facility] liaison. Assigned SW/CM to follow up tomorrow with [facility] regarding DC.        05/25/25 1500   Discharge disposition   Expected discharge disposition subacute   Post Acute Care Provider [facility]   Discharge transportation [facility]     Please notify pt/spouse of DC time. RN to call [facility] at [phone] for report.     NIYA Nguyen      
Anticipate possible IV antibiotic needs for discharge per chart review; CM started [facility] referral in aidin system, will need Level 1 PASRR screening if patient agreeable to rehab for discharge.  CM will follow up with patient/family for additional discharge needs.      Update:  1515: CM updated by RN patient will discharge with oral antibiotics per ID provider.  CM spoke to patient and spouse Venkatesh for discharge plan update; Venkatesh stated patient \"has been falling all over the place at home\" and is having a hard time taking care of patient.  CM noted patient was able to ambulate with walker and CGA per recent therapy progress note review and does not have skilled need for sub-acute rehab ([facility]) at this time.  CM discussed Skilled nursing facility (SNF) private pay, home health care, and private duty caregiver post-acute care options; Venkatesh stated he was informed  \"patient needs to be in the hospital for 3 days in order to go to rehab\".  CM reviewed Medicare [facility] benefit guidelines include minimum 3 Inpatient Midnights and skilled nursing/skilled inpatient therapy needs.  Venkatesh voiced multiple concerns about patient care at [facility] and stated \"patient has been in bed for 4 days, how is she going to walk 500 feet from the garage to the entrance of our condo?\"  CM offered to ask Therapy for patient follow up visit and update care team regarding patient care concerns.  Venkatesh agreeable to therapy follow up visit and home health care if appropriate.  Charge RN updated on patient care concerns, PT asked to visit patient if schedule allows.  CM will follow up to provide CG agency list and confirm HHC.      Nathalie Bond RN Case Manager [id number]   
Informed by RN that pt is ready for discharge today. LM for [facility]. PASRR II needed, spoke with [facility] who states someone has been assigned and it may be completed today.     12:20  Spoke with Naomi at [facility], they are aware that pt is ready and waiting on PASRR level II to be completed.     15:00  PASRR II has not yet been completed.     BELIA Sparrow RN, CM  [phone]   
LANIE followed up with pt and spouse at bedside. Pt looks great today, smiling and engaged in conversation. Reviewed DC options/plans if medically cleared for DC over weekend. After thorough discussion of options, will plan to DC to [facility] for a short stay to build endurance. LANIE provided accepting [facility] list for pt/spouse to review.     Pt referred to [facility] for review, will need PASRR screen.     NIYA Nguyen      
Message sent to [facility] to inquire on bed availability for today.     Addendum 3:00- notified via [facility] to call admissions to coordinate time. SW left VM with admissions number ([phone]) at [facility]. Also called main number at [phone]. Message left in [facility] to notify [facility] that pt is cleared for DC, transport scheduled for 5:45 PM.     Ambulance scheduled for 5:45 PM, PCS completed. RN aware.     NIYA Nguyen      
SW called [facility] ([phone]) to check status of PASRR level 2. Per rep, onsite was completed and awaiting for results to be entered on protal. Rep reports this will be available \"in a few hours\". Updated RN and [facility].     Pt medically cleared for DC.     NIYA Nguyen      
SW met with pt and spouse at bedside. Reviewed DC plans and recommendations. Spouse shared his worried regarding DC home, worried how \"we can discharge her like this\". Spouse shared concerns of pt's shivering and wants to ensure she is medically cleared. RN and MD aware.     Reviewed Medicare benefit and could go to [facility] if desired, however would anticipate a short length of stay. Spouse does not want her to \"sit all day\" in a nursing facility and would prefer to take her somewhere daily for therapy. He would like her up and moving, reports poor endurance. Recommended frequent ambulation throughout day with RN/PCT team. Spouse stated that when pt was at home, he would supervise her ADLs and ambulation. Reports pt would \"bump into things\" or \"ambulate to the left\" and would have to remind pt. Spouse appears overwhelmed with ensuring pt's safety at home and that he is meeting her needs/caring for her properly. He reports pt eating about 50% of meals at home. When asked, pt acknowledges poor appetite.  SW recommended DC home with [facility] initially and then go to outpatient therapies. SW offered to look into wheelchair coverage for longer distances (ie: going to/from parking garage) , spouse declined at this time.     SW/CM to remain available for continued DC planning. Spouse aware that rehab referrals were made. If DC to [facility] will need PAS and [facility] referral.    NIYA Nguyen        
SW notified of pt choice of [facility] for DC. Reserved in Aidin and message sent to liaison regarding choice/medical clearance.     SW to provide update regarding bed availability.     NIYA Nguyen      
-3

## 2025-05-28 ENCOUNTER — INITIAL APN SNF VISIT (OUTPATIENT)
Dept: INTERNAL MEDICINE CLINIC | Age: 75
End: 2025-05-28

## 2025-05-28 VITALS
DIASTOLIC BLOOD PRESSURE: 65 MMHG | RESPIRATION RATE: 16 BRPM | BODY MASS INDEX: 23 KG/M2 | OXYGEN SATURATION: 95 % | SYSTOLIC BLOOD PRESSURE: 105 MMHG | HEART RATE: 99 BPM | TEMPERATURE: 98 F | WEIGHT: 139 LBS

## 2025-05-28 DIAGNOSIS — F32.2 MAJOR DEPRESSIVE DISORDER, SINGLE EPISODE, SEVERE (HCC): ICD-10-CM

## 2025-05-28 DIAGNOSIS — R55 SYNCOPE, NEAR: ICD-10-CM

## 2025-05-28 DIAGNOSIS — G93.89 BRAIN MASS: ICD-10-CM

## 2025-05-28 DIAGNOSIS — C67.9 BLADDER CANCER METASTASIZED TO LUNG (HCC): Primary | ICD-10-CM

## 2025-05-28 DIAGNOSIS — E78.2 MIXED HYPERLIPIDEMIA: ICD-10-CM

## 2025-05-28 DIAGNOSIS — C78.00 BLADDER CANCER METASTASIZED TO LUNG (HCC): Primary | ICD-10-CM

## 2025-05-28 DIAGNOSIS — Z86.79 HISTORY OF HIGH BLOOD PRESSURE: ICD-10-CM

## 2025-05-28 DIAGNOSIS — J15.9 BACTERIAL PNEUMONIA: ICD-10-CM

## 2025-05-28 DIAGNOSIS — F41.1 GENERALIZED ANXIETY DISORDER: ICD-10-CM

## 2025-05-28 PROCEDURE — 1124F ACP DISCUSS-NO DSCNMKR DOCD: CPT | Performed by: NURSE PRACTITIONER

## 2025-05-28 PROCEDURE — 99306 1ST NF CARE HIGH MDM 50: CPT | Performed by: NURSE PRACTITIONER

## 2025-05-28 RX ORDER — ACETAMINOPHEN 325 MG/1
650 TABLET ORAL EVERY 6 HOURS PRN
COMMUNITY

## 2025-05-28 RX ORDER — SENNA AND DOCUSATE SODIUM 50; 8.6 MG/1; MG/1
2 TABLET, FILM COATED ORAL 2 TIMES DAILY PRN
COMMUNITY

## 2025-05-28 NOTE — ASSESSMENT & PLAN NOTE
Had low BP and dizziness on admission   BP today 105/65  Change positions slowly  Encourage PO intake   Metoprolol 25 mg BID, hold for SBP < 110, or pulse < 60  Simvastatin 40 mg nightly

## 2025-05-28 NOTE — ASSESSMENT & PLAN NOTE
Rex s.p craniotomy and tumor resection on 4/16/25  Concern for subdural empyema   NS and IS saw her inpatient - no surgical intervention at this time, follow up with NS in 2 weeks  ID follow up as needed  Follow up with oncology and radiation oncology as well on DC  Continues on Keppra 1000 mg BID, levels checked inpatient  No new neuro symptoms.

## 2025-05-28 NOTE — PROGRESS NOTES
Skilled Nursing Facility, Subacute Rehab  [facility]     Sujey Medina Author: Rama Tyler, APRN     3/6/1950 MRN [id number]   Last Hospital  Admission 5/15/25      Last Hospital Discharge 25 PCP Rajni Tiwari MD       HPI:      Sujey Medina is a 75 year old female with previous medical history including HTN, HLD, CKDIII, GERD,Depression, bladder cancer stage IV with mets to lungs and brain s/p craniotomy for tumor resection on 25. She was admitted to the hospital for weakness and falls. She was admitted for the craniotomy 4/10-. 25. She discharged to home. She developed shortness of breath and weakness. She came back to [facility]  with fall and was evaluated and sent home. She then went to [facility] ER with persistent shortness of breath with cough and falls/weakness and fatigue. MRI there was concerning for subdural empyema so she was admitted and NS and ID consulted for IV abx. She was then transferred to [facility] for continuity of care with NS. She was treated for bacterial pneumonia with meropenem and vancomycin. CTA chest negative for PE with with ground glass opacities. 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 Had craniotomy on , NS, oncology and rad onc following. Keppra continued. No surgical intervention at this time. Plan follow up in 2 weeks. Orthostatic hypotension improved during hospitalization and likely contributed to dizziness and falls.  CTH and keppra level OK. Now admitted to SNF for sub-acute rehabilitation.     Sujey is seen in bed in her room today.   She is feeling fatigued. She has no shortness of breath. Some non productive cough. No oxuygen use.   No chest pains or soreness from cough. No pain really. No dizziness, no headaches.   She hates how her craniotomy incision is looking.   No abdominal pain, no nausea, no vomiting. BM normal. Poor appetite.   Sleeping OK.   Urinating  without difficulty.   No edema.  Some bruising to hands and arms.   DW her labs, vitals and plan of care  Questions addressed.   SOFY nursing.     Hospital Discharge Diagnoses:  HLD  HTN woth orthostatic hypotension   CKD III  MDD  GERD  Stage IV bladder cancer with mets to brain  S/p craniotomy and frontal tumor resection 4/16/25  Bacterial pneumonia  Concern for subdural empyema near resection site  Falls recurrent  Weakness  Dizziness      Chief Complaint at visit:   Chief Complaint   Patient presents with    Follow - Up     Pneumonia  Stage IV bladder cancer with mets to lung, brain  S/p craniotomy and tumor resection on 4/16/25        ALLERGIES    ALLERGIES:  Allergies[1]    CURRENT MEDS:      CURRENT MEDICATIONS   Current Medications[2]    HISTORY:    Past Medical History[3]  Past Surgical History[4]  Family History[5]  Short Social Hx on File[6]      POA not on file     CODE STATUS:  Full Code    ADVANCED CARE PLANNING TEAM: None    SUBJECTIVE/ ROS:       REVIEW OF SYSTEMS:  GENERAL HEALTH:fatigues easily  SKIN: denies any unusual skin lesions or rashes  WOUNDS: old craniotomy incision frontal   EYES:no visual complaints or deficits  HENT: denies nasal congestion, sinus pain or sore throat; and hearing loss negative  RESPIRATORY: ---no shortness of breath, non productive cough, no pleural pain  CARDIOVASCULAR:denies chest pain, no palpitations , denies syncope  GI: denies nausea, vomiting, constipation, diarrhea; no rectal bleeding; no heartburn  Gu: No urinary frequency, urgency or dysuria  MUSCULOSKELETAL:no joint complaints upper or lower extremities  NEURO:no sensory or motor complaint  PSYCHE: no symptoms of depression or anxiety  HEMATOLOGY:denies bruising, denies excessive bleeding  ENDOCRINE: denies excessive thirst or urination; denies unexpected wt gain or wt loss  ALLERGY/IMM.: denies food or seasonal allergies      OBJECTIVE:     ASSESSMENT/ PHYSICAL EXAM:     VITALS:  /65   Pulse 99   Temp  97.5 °F (36.4 °C)   Resp 16   Wt 139 lb (63 kg)   LMP  (LMP Unknown)   SpO2 95%   BMI 23.13 kg/m²      PHYSICAL EXAM:  GENERAL HEALTH: well developed, well nourished, in no apparent distress  LINES, TUBES, DRAINS:  none  SKIN: warm, dry  WOUND: old craniotomy scar frontal, well approximated, no erythema, no drainage   Ecchymosis and swelling old IV sites hands and forearms.   EYES: sclera anicteric, conjunctiva normal; there is no nystagmus, no drainage from eyes  HENT: normocephalic; normal nose, no nasal drainage, mucous membranes pink, moist.  NECK: supple, non tender, FROM  BREAST: deferred  RESPIRATORY:clear anterior lung fields, no crackles, no wheezing, no dyspnea, no cough, room air  CARDIOVASCULAR: RRR, S1 and S2, no murmurs, no edema  ABDOMEN:  normal active BS+, soft, nondistended; no organomegaly, no masses; nontender, no guarding, no rebound tenderness.  :no suprapubic distension  LYMPHATIC:no lymphedema  MUSCULOSKELETAL: no acute synovitis upper or lower extremity  EXTREMITIES/VASCULAR:radial pulses 2+ and dorsalis pedal pulses 2+  NEUROLOGIC: A&OX3, no focal deficits, follows commands  PSYCHIATRIC: calm, cooperative, mood and affect appropriate to situation    Hospital and rehab lab results and imaging reviewed as available.    DIAGNOSTICS REVIEWED AT THIS VISIT:    Lab Results   Component Value Date    WBC 6.7 05/28/2025    RBC 3.32 (L) 05/28/2025    HGB 11.2 (L) 05/28/2025    HCT 33.0 (L) 05/28/2025    MCV 99.4 05/28/2025    MCH 33.7 05/28/2025    MCHC 33.9 05/28/2025    RDW 15.8 05/28/2025    .0 05/28/2025     Lab Results   Component Value Date    GLU 89 05/28/2025    BUN 26 (H) 05/28/2025    BUNCREA 30.0 (H) 04/08/2022    CREATSERUM 0.94 05/28/2025    ANIONGAP 12 05/28/2025    GFR >59 02/25/2011    CA 9.0 05/28/2025    OSMOCALC 290 05/28/2025    ALKPHO 95 05/28/2025    AST 34 (H) 05/28/2025    ALT 68 (H) 05/28/2025    BILT 0.4 05/28/2025    TP 6.0 05/28/2025    ALB 3.6 05/28/2025     GLOBULIN 2.4 05/28/2025    AGRATIO 1.0 (L) 08/28/2013     05/28/2025    K 3.9 05/28/2025     05/28/2025    CO2 21.0 05/28/2025       [facility] medical records reviewed.  Medication reconciliation completed.        MEDICAL DECISION MAKING and PLAN OF CARE:     Assessment & Plan  Bladder cancer metastasized to lung (HCC)  Brain mass  Metastatis, s.p craniotomy and tumor resection on 4/16/25  Concern for subdural empyema   NS and IS saw her inpatient - no surgical intervention at this time, follow up with NS in 2 weeks  ID follow up as needed  Follow up with oncology and radiation oncology as well on DC  Continues on Keppra 1000 mg BID, levels checked inpatient  No new neuro symptoms.       Bacterial pneumonia  RVP neg, procal elevated, legionella and strep pneumo negative  Meropenem and vancomycin completed as inpatient  Only non productive cough now  No fevers, no chills, no hypoxia, WBC wnl   CTA was neg for PE, + ground glass opacities        Mixed hyperlipidemia  History of high blood pressure  Syncope, near  Had low BP and dizziness on admission   BP today 105/65  Change positions slowly  Encourage PO intake   Metoprolol 25 mg BID, hold for SBP < 110, or pulse < 60  Simvastatin 40 mg nightly       Generalized anxiety disorder  Major depressive disorder, single episode, severe (HCC)  Mood appears appropriate  Bupropion 200 mg QAM  Buspirone 5 mg BID  NO SI, NO HI           Physical Deconditioning/Weakness; at risk for falling  Fall Precautions  GENNARO team to establish care plan meeting with patient and POA/family as appropriate  GENNARO team/ & discharge planner to assist with establishing safe discharge plan for next level of care  PT/OT evaluate and treat  DC planning with MDT, LANIE, therapies, anticipated DC date : TBD  Services on DC: [facility]HC RN/PT/OT/SLP/SW  Equipment on DC: Wc/walker/bed/Commode     Pain management  Monitor and assess pain  Tylenol 650 mg PO Q6 prn  Offer to pre-medicate  30-60 min prior to therapy  Physiatry evaluation with management appreciated    Bowel management  Monitor for Bms  Senna-docusate BID prn    Vitamins/supplements as r/t deficiencies  [facility] RD to follow while in rehab; supplementation/diet as per [facility] RD  May continue home supplements  MVI daily    At risk for malnutrition  Dietician consult  Weekly weights  Supplements as indicated  Encourage po intake    DVT Prophylaxis   Encourage exercise and participation with therapy as much as able  No AC with brain tumor    GI prophylaxis  Famotidine     Labs  CBC, CMP weekly and prn    Follow Ups:  PCP within 7 days of DC from rehab  Neurosurgery follow up 6/24/25, was to see in 2 weeks  ID Dr Carlton as needed    Future Appointments   Date Time Provider Department Center   6/24/2025  2:30 PM Candido Daigle MD [facility]         This dictation was performed with a verbal recognition program (DRAGON) and it was checked for errors. It is possible that there are still dictated errors within this note. If so, please bring any errors to my attention for an addendum. All efforts were made to ensure that this note is accurate.      75 min spent w/ patient and reviewing medical records, labs, completing medication reconciliation and entering orders to establish plan of care in [facility].      Note to patient: The 21st Century Cures Act makes medical notes like these available to patients in the interest of transparency. However, this is a medical document intended as peer to peer communication. It is written in medical language and may contain abbreviations or verbiage that are unfamiliar. It may appear blunt or direct. Medical documents are intended to carry relevant information, facts as evident, and the clinical opinion of the practitioner who signs the document.       Rama Tyler, APRN  05/28/25                      [1] No Known Allergies  [2]   Current Outpatient Medications   Medication Sig Dispense Refill     acetaminophen 325 MG Oral Tab Take 2 tablets (650 mg total) by mouth every 6 (six) hours as needed for Pain.      senna-docusate 8.6-50 MG Oral Tab Take 2 tablets by mouth as needed in the morning and 2 tablets as needed in the evening for constipation.      famotidine 20 MG Oral Tab Take 1 tablet (20 mg total) by mouth daily for 15 days. 15 tablet 0    levETIRAcetam 1000 MG Oral Tab Take 1 tablet (1,000 mg total) by mouth 2 (two) times daily. 60 tablet 0    buPROPion HCl ER, SR, 200 MG Oral Tablet 12 Hr Take 1 tablet (200 mg total) by mouth in the morning.      busPIRone 5 MG Oral Tab Take 1 tablet (5 mg total) by mouth in the morning and 1 tablet (5 mg total) before bedtime.      metoprolol tartrate 25 MG Oral Tab Take 1 tablet (25 mg total) by mouth 2 (two) times daily.      acidophilus-pectin Oral Cap Take 1 capsule by mouth in the morning.      simvastatin 40 MG Oral Tab TAKE ONE TABLET (40 MG TOTAL) BY MOUTH NIGHTLY 90 tablet 3    Multiple Vitamin (MULTI-VITAMIN DAILY OR) Take 1 tablet by mouth in the morning.     [3]   Past Medical History:   Cancer (HCC)    CKD (chronic kidney disease), stage III (HCC)    HYPERLIPIDEMIA    Hypertension    OSTEOPENIA    Personal history of bladder cancer    S/P ileoconduit   [4]   Past Surgical History:  Procedure Laterality Date    Benign biopsy right  1988    Colon ca scrn not hi rsk ind N/A 10/28/2015    Procedure: COLONOSCOPY, POSSIBLE BIOPSY, POSSIBLE POLYPECTOMY 99337;  Surgeon: Lai Heard MD;  Location: [facility]    Colonoscopy  6/03    diverticulosis, hemorrhoids    Colonoscopy,diagnostic  10/28/15    diverticulosis    Cystoscopy,insert ureteral stent  3/12/10    Performed by BARNEY IZAGUIRRE at [facility]    Cystoscopy,insert ureteral stent  9/10/2010    Performed by BARNEY IZAGUIRRE at [facility]    Cystoscopy,remv calculus,simple  11/19/2010    Performed by BARNEY IZAGUIRRE at [facility]     Cystourethroscopy  3/2/2011    Performed by APRIL NO at [facility], [facility]    Cystourethroscopy,biopsy  11/19/2010    Performed by BARNEY IZAGUIRRE at [facility]    Cystourethroscopy,fulgur 2-5cm lesn  3/12/10    Performed by BARNEY IZAGUIRRE at [facility], [facility]    Cystourethroscopy,fulgur 2-5cm lesn  9/10/2010    Performed by BARNEY IZAGUIRRE at [facility]    Eeg phy/qhp ea incr w/veeg  4/18/2025    Fluor gid ctr vad plmt rplcmt/rmvl  3/14/2011    Performed by TANESHA ALEXANDRE at [facility]    Insertion, tunneled centrally inserted venous access device, w/subq port; >5 years  3/14/2011    Performed by TANESHA ALEXANDRE at [facility]    Other surgical history  22 yrs ago    right breast bx    Other surgical history  2-23-09    cysto, Dr. Izaguirre    Other surgical history  6-3-10    cysto-poss [id number]-Ql. [facility]rick    Other surgical history  9/10/10    cysto, URS, RPG, TURBT, stent exchange-Dr. Izaguirre    Other surgical history  2/8/11    cysto- Dr. Izaguirre    Other surgical history      thoracic surgery    Other surgical history  11/20/13     VATS RUL    Patient documented not to have experienced any of the following events N/A 10/28/2015    Procedure: COLONOSCOPY, POSSIBLE BIOPSY, POSSIBLE POLYPECTOMY 48537;  Surgeon: Lai Heard MD;  Location: [facility]    Patient withough preoperative order for iv antibiotic surgical site infection prophylaxis. N/A 10/28/2015    Procedure: COLONOSCOPY, POSSIBLE BIOPSY, POSSIBLE POLYPECTOMY 06118;  Surgeon: Lai Heard MD;  Location: [facility]    Removal of tunneled cva device, with subq port or pump, central or peripheral insertion  12/17/2013    Procedure: PORT-A-CATH REMOVAL;  Surgeon: Bobby Palmer MD;  Location: [facility]    Skin surgery  06/13/2019    MOHS, BCC - nasal dorsum, Dr. PATRICIA Ghosh    Tubal ligation      X-ray retrograde pyelogram   3/12/10    Performed by BARNEY IZAGUIRRE at [facility] [place], [facility]    X-ray retrograde pyelogram  9/10/2010    Performed by BARNEY IZAGUIRRE at [facility] [place], [facility]   [5]   Family History  Problem Relation Age of Onset    Other (Other) Father         suicide    Heart Disorder Mother     Heart Disorder Maternal Grandmother     Other (Other) Sister         mentally disabled    Cancer Neg    [6]   Social History  Socioeconomic History    Marital status:     Number of children: 0   Occupational History    Occupation: Semi retired    Tobacco Use    Smoking status: Never    Smokeless tobacco: Never   Vaping Use    Vaping status: Never Used   Substance and Sexual Activity    Alcohol use: Not Currently     Alcohol/week: 2.0 standard drinks of alcohol     Types: 2 Standard drinks or equivalent per week    Drug use: No   Other Topics Concern    Seat Belt Yes   Social History Narrative    Semi retired . . G0     Social Drivers of Health     Food Insecurity: No Food Insecurity (5/15/2025)    NCSS - Food Insecurity     Worried About Running Out of Food in the Last Year: No     Ran Out of Food in the Last Year: No   Transportation Needs: No Transportation Needs (5/15/2025)    NCSS - Transportation     Lack of Transportation: No   Housing Stability: Not At Risk (5/15/2025)    NCSS - Housing/Utilities     Has Housing: Yes     Worried About Losing Housing: No     Unable to Get Utilities: No

## 2025-06-03 ENCOUNTER — SNF VISIT (OUTPATIENT)
Dept: INTERNAL MEDICINE CLINIC | Age: 75
End: 2025-06-03

## 2025-06-03 VITALS
TEMPERATURE: 98 F | DIASTOLIC BLOOD PRESSURE: 81 MMHG | BODY MASS INDEX: 23 KG/M2 | RESPIRATION RATE: 16 BRPM | SYSTOLIC BLOOD PRESSURE: 120 MMHG | HEART RATE: 101 BPM | WEIGHT: 138.19 LBS | OXYGEN SATURATION: 98 %

## 2025-06-03 DIAGNOSIS — C67.9 BLADDER CANCER METASTASIZED TO LUNG (HCC): Primary | ICD-10-CM

## 2025-06-03 DIAGNOSIS — E78.2 MIXED HYPERLIPIDEMIA: ICD-10-CM

## 2025-06-03 DIAGNOSIS — Z86.79 HISTORY OF HIGH BLOOD PRESSURE: ICD-10-CM

## 2025-06-03 DIAGNOSIS — G93.89 BRAIN MASS: ICD-10-CM

## 2025-06-03 DIAGNOSIS — R55 SYNCOPE, NEAR: ICD-10-CM

## 2025-06-03 DIAGNOSIS — C78.00 BLADDER CANCER METASTASIZED TO LUNG (HCC): Primary | ICD-10-CM

## 2025-06-03 DIAGNOSIS — D64.9 ANEMIA, UNSPECIFIED TYPE: ICD-10-CM

## 2025-06-03 DIAGNOSIS — F32.2 MAJOR DEPRESSIVE DISORDER, SINGLE EPISODE, SEVERE (HCC): ICD-10-CM

## 2025-06-03 DIAGNOSIS — J15.9 BACTERIAL PNEUMONIA: ICD-10-CM

## 2025-06-03 DIAGNOSIS — F41.1 GENERALIZED ANXIETY DISORDER: ICD-10-CM

## 2025-06-03 PROCEDURE — 99309 SBSQ NF CARE MODERATE MDM 30: CPT | Performed by: NURSE PRACTITIONER

## 2025-06-03 NOTE — ASSESSMENT & PLAN NOTE
Metastatis, s.p craniotomy and tumor resection on 4/16/25  Concern for subdural empyema   NS and IS saw her inpatient - no surgical intervention at this time, follow up with NS in 1 month  ID follow up as needed  Follow up with oncology and radiation oncology as well on DC  Continues on Keppra 1000 mg BID, levels checked inpatient  No new neuro symptoms., just marked fatigue and poor endurance

## 2025-06-03 NOTE — ASSESSMENT & PLAN NOTE
Had low BP and dizziness on admission   BP today 120/81   Change positions slowly  Encourage PO intake   Metoprolol 25 mg BID, hold for SBP < 110, or pulse < 60  Simvastatin 40 mg nightly

## 2025-06-03 NOTE — PROGRESS NOTES
Sujey Medina, 3/6/1950, 75 year old, female    Chief Complaint:    Chief Complaint   Patient presents with    Follow - Up     Fatigue  Deconditioning  Anemia  Recent pneumonia  Metastatic bladder cancer with urostomy         Subjective:   TODAY:  Sujey is seen in her wheelchair and then in bed today. She is very fatigued after shower today. She has poor endurance and is shocked at how tired and weak she feels. No pain today. No dizziness. No chest pain, no shortness of breath. She is off oxygen now.   She is very tired and gets weak easily. Appetite is OK. She is sleeping well. BM too much now she states.    came to visit. Reviewed with them, still with some anemia. B12 and folate were wnl last hospitalization. Repeating CBC with iron studies on Thursday. DW them therapies and exercise to increase endurance. VSS, heart rate improving.   Questions addressed. DW nursing.       Objective:  /81   Pulse 101   Temp 97.6 °F (36.4 °C)   Resp 16   Wt 138 lb 3.2 oz (62.7 kg)   LMP  (LMP Unknown)   SpO2 98%   BMI 23.00 kg/m²     PHYSICAL EXAM:  GENERAL HEALTH: well developed, well nourished, in no apparent distress  LINES, TUBES, DRAINS:  urostomy  SKIN: pink, warm, dry  WOUND: old craniotomy scar frontal, healing well.   EYES:  sclera anicteric, conjunctiva normal; there is no nystagmus, no drainage from eyes  HENT:  normocephalic; normal nose, no nasal drainage, mucous membranes pink, moist.  NECK:  supple, non tender, FROM  BREAST:  deferred  RESPIRATORY: clear, no crackles, no wheezing, no dyspnea, no cough, room air  CARDIOVASCULAR: RRR, S1 and S2, no murmurs, no edema  ABDOMEN:  normal active BS+, soft, nondistended; no organomegaly, no masses; nontender, no guarding, no rebound tenderness.  :no suprapubic distension urostomy dark yellow   LYMPHATIC:no lymphedema  MUSCULOSKELETAL: no acute synovitis upper or lower extremity  EXTREMITIES/VASCULAR:radial pulses 2+ and dorsalis pedal pulses  2+  NEUROLOGIC: A&OX3, no focal deficits, follows commands  PSYCHIATRIC: calm, cooperative, mood and affect appropriate to situation      Therapy update:  Transfers: min assist  ADLS:  max assist  Ambulation:  15-20 ft with RW and min assist, poor endurance  DC plan: home with spouse    Medications reviewed: Yes    Medications - Current[1]      Diagnostics reviewed:    Lab Results   Component Value Date    WBC 5.2 06/02/2025    RBC 3.06 (L) 06/02/2025    HGB 10.4 (L) 06/02/2025    HCT 30.7 (L) 06/02/2025    .3 (H) 06/02/2025    MCH 34.0 06/02/2025    MCHC 33.9 06/02/2025    RDW 15.4 06/02/2025    .0 (L) 06/02/2025     Lab Results   Component Value Date    GLU 81 06/02/2025    BUN 16 06/02/2025    BUNCREA 30.0 (H) 04/08/2022    CREATSERUM 0.89 06/02/2025    ANIONGAP 11 06/02/2025    GFR >59 02/25/2011    CA 8.6 (L) 06/02/2025    OSMOCALC 294 06/02/2025    ALKPHO 95 05/28/2025    AST 34 (H) 05/28/2025    ALT 68 (H) 05/28/2025    BILT 0.4 05/28/2025    TP 6.0 05/28/2025    ALB 3.6 05/28/2025    GLOBULIN 2.4 05/28/2025    AGRATIO 1.0 (L) 08/28/2013     06/02/2025    K 3.8 06/02/2025     06/02/2025    CO2 22.0 06/02/2025       Component  Ref Range & Units 5/15/25  5:53 AM  Vitamin B12  180 - 914 pg/mL 346  Comment:  Normal Range: 180-914 pg/mL.  Indeterminate Range: 145-180 pg/mL.  Deficient Range: <=145 pg/mL.    Folate, Serum  6.0 - 20.0 ng/mL >20.0 High   Resulting Agency [facility]    Specimen Collected: 05/15/25  5:53 AM   Performed by: [facility] Last Resulted: 05/15/25  3:46 PM  Received From: [facility]  Result Received: 05/15/25  7:11 PM      Assessment and plan:  Assessment & Plan  Bladder cancer metastasized to lung (HCC)  Brain mass  Metastatis, s.p craniotomy and tumor resection on 4/16/25  Concern for subdural empyema   NS and IS saw her inpatient - no surgical intervention at this time, follow up with NS in 1 month  ID follow up as needed  Follow up with oncology  and radiation oncology as well on DC  Continues on Keppra 1000 mg BID, levels checked inpatient  No new neuro symptoms., just marked fatigue and poor endurance       Bacterial pneumonia  RVP neg, procal elevated, legionella and strep pneumo negative  Meropenem and vancomycin completed as inpatient  No cough now, still with fatigue and poor endurance   No fevers, no chills, no hypoxia, WBC wnl   CTA was neg for PE, + ground glass opacities        Mixed hyperlipidemia  History of high blood pressure  Syncope, near  Had low BP and dizziness on admission   BP today 120/81   Change positions slowly  Encourage PO intake   Metoprolol 25 mg BID, hold for SBP < 110, or pulse < 60  Simvastatin 40 mg nightly       Major depressive disorder, single episode, severe (HCC)  Generalized anxiety disorder  Mood appears appropriate  Bupropion 200 mg QAM  Buspirone 5 mg BID  NO SI, NO HI       Anemia, unspecified type  Hgb 10.4  B12 and folate were normal 5/15/25  Repeat CBC with iron studies on Thursday  DW patient and spouse on MVI now, consider iron as indicated          Physical Deconditioning/Weakness; at risk for falling  Fall Precautions  GENNARO team to establish care plan meeting with patient and POA/family as appropriate  GENNARO team/ & discharge planner to assist with establishing safe discharge plan for next level of care  PT/OT evaluate and treat  DC planning with MDT, SW, therapies, anticipated DC date : TBD  Services on DC: [facility] RN/PT/OT/SLP/SW  Equipment on DC: Wc/walker/bed/Commode      Pain management  Monitor and assess pain  Tylenol 650 mg PO Q6 prn  Offer to pre-medicate 30-60 min prior to therapy  Physiatry evaluation with management appreciated     Bowel management  Monitor for Bms  Senna-docusate BID prn     Vitamins/supplements as r/t deficiencies  GENNARO RD to follow while in rehab; supplementation/diet as per GENNARO RD  May continue home supplements  MVI daily     At risk for malnutrition  Dietician  consult  Weekly weights  Supplements as indicated  Encourage po intake     DVT Prophylaxis   Encourage exercise and participation with therapy as much as able  No AC with brain tumor     GI prophylaxis  Famotidine      Labs  CBC, CMP weekly and prn  Repeat CBC with iron studies Thursday      Follow Ups:  PCP within 7 days of DC from rehab  Neurosurgery follow up 6/24/25, was to see in 2 weeks  ID Dr Carlton as needed     Future Appointments   Date Time Provider Department Center   6/24/2025  2:30 PM Candido Daigle MD [facility]       *Established patient; follow-up moderately complex visit/ greater than 30     35 minutes spent w/ patient and staff, including but not limited to/ reviewing present status, needs, abilities with disciplines, reviewing medical records, vital signs, labs, completing medication reconciliation and entering orders for continued care in [facility].    Note to patient: The 21st Century Cures Act makes medical notes like these available to patients in the interest of transparency. However, this is a medical document intended as peer to peer communication. It is written in medical language and may contain abbreviations or verbiage that are unfamiliar. It may appear blunt or direct. Medical documents are intended to carry relevant information, facts as evident, and the clinical opinion of the practitioner who signs the document.    Rama Tyler, APRN  6/3/2025       [1]   Current Outpatient Medications:     acetaminophen 325 MG Oral Tab, Take 2 tablets (650 mg total) by mouth every 6 (six) hours as needed for Pain., Disp: , Rfl:     senna-docusate 8.6-50 MG Oral Tab, Take 2 tablets by mouth as needed in the morning and 2 tablets as needed in the evening for constipation., Disp: , Rfl:     famotidine 20 MG Oral Tab, Take 1 tablet (20 mg total) by mouth daily for 15 days., Disp: 15 tablet, Rfl: 0    levETIRAcetam 1000 MG Oral Tab, Take 1 tablet (1,000 mg total) by mouth 2 (two) times  daily., Disp: 60 tablet, Rfl: 0    buPROPion HCl ER, SR, 200 MG Oral Tablet 12 Hr, Take 1 tablet (200 mg total) by mouth in the morning., Disp: , Rfl:     busPIRone 5 MG Oral Tab, Take 1 tablet (5 mg total) by mouth in the morning and 1 tablet (5 mg total) before bedtime., Disp: , Rfl:     metoprolol tartrate 25 MG Oral Tab, Take 1 tablet (25 mg total) by mouth 2 (two) times daily., Disp: , Rfl:     acidophilus-pectin Oral Cap, Take 1 capsule by mouth in the morning., Disp: , Rfl:     simvastatin 40 MG Oral Tab, TAKE ONE TABLET (40 MG TOTAL) BY MOUTH NIGHTLY, Disp: 90 tablet, Rfl: 3    Multiple Vitamin (MULTI-VITAMIN DAILY OR), Take 1 tablet by mouth in the morning., Disp: , Rfl:

## 2025-06-05 ENCOUNTER — SNF VISIT (OUTPATIENT)
Dept: INTERNAL MEDICINE CLINIC | Age: 75
End: 2025-06-05

## 2025-06-05 VITALS
DIASTOLIC BLOOD PRESSURE: 66 MMHG | OXYGEN SATURATION: 95 % | BODY MASS INDEX: 23 KG/M2 | RESPIRATION RATE: 16 BRPM | HEART RATE: 96 BPM | SYSTOLIC BLOOD PRESSURE: 105 MMHG | WEIGHT: 138 LBS | TEMPERATURE: 98 F

## 2025-06-05 DIAGNOSIS — F41.1 GENERALIZED ANXIETY DISORDER: ICD-10-CM

## 2025-06-05 DIAGNOSIS — D64.9 ANEMIA, UNSPECIFIED TYPE: ICD-10-CM

## 2025-06-05 DIAGNOSIS — E78.2 MIXED HYPERLIPIDEMIA: ICD-10-CM

## 2025-06-05 DIAGNOSIS — F32.2 MAJOR DEPRESSIVE DISORDER, SINGLE EPISODE, SEVERE (HCC): ICD-10-CM

## 2025-06-05 DIAGNOSIS — Z78.9 DECREASED ACTIVITIES OF DAILY LIVING (ADL): ICD-10-CM

## 2025-06-05 DIAGNOSIS — C78.00 BLADDER CANCER METASTASIZED TO LUNG (HCC): ICD-10-CM

## 2025-06-05 DIAGNOSIS — Z86.79 HISTORY OF HIGH BLOOD PRESSURE: ICD-10-CM

## 2025-06-05 DIAGNOSIS — C67.9 BLADDER CANCER METASTASIZED TO LUNG (HCC): ICD-10-CM

## 2025-06-05 DIAGNOSIS — J15.9 BACTERIAL PNEUMONIA: Primary | ICD-10-CM

## 2025-06-05 DIAGNOSIS — G93.89 BRAIN MASS: ICD-10-CM

## 2025-06-05 PROCEDURE — 99309 SBSQ NF CARE MODERATE MDM 30: CPT | Performed by: NURSE PRACTITIONER

## 2025-06-05 NOTE — PROGRESS NOTES
Sujey Medina, 3/6/1950, 75 year old, female    Chief Complaint:    Chief Complaint   Patient presents with    Follow - Up     Fatigue  Poor endurance  S/p craniotomy for metastatic bladder cancer  Shortness of breath, recent pneumonia  Weakness  Poor appetite         Subjective:   TODAY:  Sujey is seen in her wheelchair in her room today.   Her spouse is present at the bedside.     She remains fatigued and has endurance.   No pain today. No dizziness. No chest pain, no shortness of breath, no hypoxia. She denies headaches, vision changes. Spouse noted shaking, shakiness, she relates yes she has noticed this.   Appetite is poor, not wanting to eat. No abdominal pain, no nasuea, no vomiting.   She does not drink much water.   Likes the vanilla protein drink.     She is sleeping well.   BM normal now.     Reviewed low BP and creat trending up. DW them increase fluid intake.   Still with hgb 10s anemia of chronic disease. B12 and folate were wnl last hospitalization. Iron and ferritin wnl now.    DW them therapies and exercise to increase endurance. VSS, heart rate improving. Encouraged intake and fluids. Rest and exercise.   Questions addressed. DW nursing, SW.       Objective:  /66   Pulse 96   Temp 97.5 °F (36.4 °C)   Resp 16   Wt 138 lb (62.6 kg)   LMP  (LMP Unknown)   SpO2 95%   BMI 22.96 kg/m²     PHYSICAL EXAM:  GENERAL HEALTH: well developed, well nourished, in no apparent distress  LINES, TUBES, DRAINS:  urostomy  SKIN: pink, warm, dry  WOUND: old craniotomy scar frontal, healing well.   EYES:  sclera anicteric, conjunctiva normal; there is no nystagmus, no drainage from eyes  HENT:  normocephalic; normal nose, no nasal drainage, mucous membranes pink, moist.  NECK:  supple, non tender, FROM  RESPIRATORY: clear, no crackles, no wheezing, no dyspnea, no cough, room air  CARDIOVASCULAR: RRR, S1 and S2, no murmurs, no edema  ABDOMEN:  normal active BS+, soft, nondistended; no organomegaly, no  masses; nontender, no guarding, no rebound tenderness.  :no suprapubic distension urostomy dark yellow   MUSCULOSKELETAL: no acute synovitis upper or lower extremity  EXTREMITIES/VASCULAR:radial pulses 2+ and dorsalis pedal pulses 2+  NEUROLOGIC: A&OX3, no focal deficits, follows commands  PSYCHIATRIC: calm, cooperative, mood and affect appropriate to situation    Therapy update:  Transfers: min assist  ADLS:  LB and shower max assist, toilet hygiene min assist, UB and other hygiene SBA   Ambulation:  20 ft with RW and min assist, poor endurance  DC plan: home with spouse    Medications reviewed: Yes    Medications - Current[1]      Diagnostics reviewed:    Lab Results   Component Value Date    WBC 6.9 06/05/2025    RBC 3.09 (L) 06/05/2025    HGB 10.1 (L) 06/05/2025    HCT 30.6 (L) 06/05/2025    MCV 99.0 06/05/2025    MCH 32.7 06/05/2025    MCHC 33.0 06/05/2025    RDW 15.7 06/05/2025    .0 (L) 06/05/2025     Lab Results   Component Value Date    GLU 95 06/05/2025    BUN 15 06/05/2025    BUNCREA 30.0 (H) 04/08/2022    CREATSERUM 1.14 (H) 06/05/2025    ANIONGAP 10 06/05/2025    GFR >59 02/25/2011    CA 8.8 06/05/2025    OSMOCALC 291 06/05/2025    ALKPHO 95 05/28/2025    AST 34 (H) 05/28/2025    ALT 68 (H) 05/28/2025    BILT 0.4 05/28/2025    TP 6.0 05/28/2025    ALB 3.6 05/28/2025    GLOBULIN 2.4 05/28/2025    AGRATIO 1.0 (L) 08/28/2013     06/05/2025    K 3.9 06/05/2025     06/05/2025    CO2 23.0 06/05/2025           Ass  Component      Latest Ref Rng 6/5/2025   Iron, Serum      50 - 170 ug/dL 58    Transferrin      250 - 380 mg/dL 163 (L)    Iron Bind.Cap.(TIBC)      250 - 425 ug/dL 225 (L)    Iron Saturation      15 - 50 % 26    FERRITIN      50 - 306 ng/mL 253       Legend:  (L) Lowessment and plan:  Assessment & Plan  Bacterial pneumonia  RVP neg, procal elevated, legionella and strep pneumo negative  Meropenem and vancomycin completed as inpatient  No cough now, still with fatigue and poor  endurance   No fevers, no chills, no hypoxia, WBC wnl   CTA was neg for PE, + ground glass opacities        Bladder cancer metastasized to lung (HCC)  Brain mass  Metastatis, s.p craniotomy and tumor resection on 4/16/25  Concern for subdural empyema   NS and IS saw her inpatient - no surgical intervention at this time, follow up with NS in 1 month  ID follow up as needed  Follow up with oncology and radiation oncology as well on DC  Continues on Keppra 1000 mg BID, levels checked inpatient  No new neuro symptoms., just marked fatigue and poor endurance       Mixed hyperlipidemia  History of high blood pressure  /66  Encourage po intake  Change positions slowly  Metoprolol 25 mg BID  Simvastatin 40 mg nightly        Generalized anxiety disorder  Major depressive disorder, single episode, severe (HCC)  Mood appears stable   Bupropion 200 mg QAM  Buspirone 5 mg BID  NO SI, NO HI       Anemia, unspecified type  Hgb 10.4, 10.1   B12 and folate were normal 5/15/25  Iron, iron sat and ferritin wnl  Transferrin, TIBC low consistent with anemia of chronic disease  DW patient and spouse on MVI now       Decreased activities of daily living (ADL)  Physical Deconditioning/Weakness; at risk for falling  Fall Precautions  GENNARO team to establish care plan meeting with patient and POA/family as appropriate  GENNARO team/ & discharge planner to assist with establishing safe discharge plan for next level of care  PT/OT evaluate and treat  DC planning with MDT, LANIE, therapies, anticipated DC date : TBD  Services on DC: C RN/PT/OT/SLP/SW  Equipment on DC: Wc/walker/bed/Commode            Pain management  Monitor and assess pain  Tylenol 650 mg PO Q6 prn  Offer to pre-medicate 30-60 min prior to therapy  Physiatry evaluation with management appreciated     Bowel management  Monitor, BM normal now   Senna-docusate BID prn     Vitamins/supplements as r/t deficiencies  GENNARO RD to follow while in rehab; supplementation/diet  as per [facility] RD  May continue home supplements  MVI daily     At risk for malnutrition  Dietician consult  Weekly weights  Supplements as indicated  Encourage po intake  DW her balanced meals, smaller meals and snacks      DVT Prophylaxis   Encourage exercise and participation with therapy as much as able  No AC with brain tumor     GI prophylaxis  Famotidine      Labs  CBC, CMP weekly and prn     Follow Ups:  PCP within 7 days of DC from rehab  Neurosurgery follow up 6/24/25, was to see in 2 weeks  Heme/Onc follow up as directed   ID Dr Carlton as needed     Future Appointments   Date Time Provider Department Center   6/24/2025  2:30 PM Candido Daigle MD [facility]       *Established patient; follow-up moderately complex visit/ greater than 30     35 minutes spent w/ patient and staff, including but not limited to/ reviewing present status, needs, abilities with disciplines, reviewing medical records, vital signs, labs, completing medication reconciliation and entering orders for continued care in [facility].    Note to patient: The 21st Century Cures Act makes medical notes like these available to patients in the interest of transparency. However, this is a medical document intended as peer to peer communication. It is written in medical language and may contain abbreviations or verbiage that are unfamiliar. It may appear blunt or direct. Medical documents are intended to carry relevant information, facts as evident, and the clinical opinion of the practitioner who signs the document.    Rama Tyler, APRN  6/5/2025         [1]   Current Outpatient Medications:     acetaminophen 325 MG Oral Tab, Take 2 tablets (650 mg total) by mouth every 6 (six) hours as needed for Pain., Disp: , Rfl:     senna-docusate 8.6-50 MG Oral Tab, Take 2 tablets by mouth as needed in the morning and 2 tablets as needed in the evening for constipation., Disp: , Rfl:     famotidine 20 MG Oral Tab, Take 1 tablet (20 mg total) by  mouth daily for 15 days., Disp: 15 tablet, Rfl: 0    levETIRAcetam 1000 MG Oral Tab, Take 1 tablet (1,000 mg total) by mouth 2 (two) times daily., Disp: 60 tablet, Rfl: 0    buPROPion HCl ER, SR, 200 MG Oral Tablet 12 Hr, Take 1 tablet (200 mg total) by mouth in the morning., Disp: , Rfl:     busPIRone 5 MG Oral Tab, Take 1 tablet (5 mg total) by mouth in the morning and 1 tablet (5 mg total) before bedtime., Disp: , Rfl:     metoprolol tartrate 25 MG Oral Tab, Take 1 tablet (25 mg total) by mouth 2 (two) times daily., Disp: , Rfl:     acidophilus-pectin Oral Cap, Take 1 capsule by mouth in the morning., Disp: , Rfl:     simvastatin 40 MG Oral Tab, TAKE ONE TABLET (40 MG TOTAL) BY MOUTH NIGHTLY, Disp: 90 tablet, Rfl: 3    Multiple Vitamin (MULTI-VITAMIN DAILY OR), Take 1 tablet by mouth in the morning., Disp: , Rfl:

## 2025-06-05 NOTE — ASSESSMENT & PLAN NOTE
/66  Encourage po intake  Change positions slowly  Metoprolol 25 mg BID  Simvastatin 40 mg nightly

## 2025-06-10 ENCOUNTER — SNF VISIT (OUTPATIENT)
Dept: INTERNAL MEDICINE CLINIC | Age: 75
End: 2025-06-10

## 2025-06-10 VITALS
DIASTOLIC BLOOD PRESSURE: 75 MMHG | RESPIRATION RATE: 14 BRPM | BODY MASS INDEX: 23 KG/M2 | SYSTOLIC BLOOD PRESSURE: 122 MMHG | TEMPERATURE: 98 F | HEART RATE: 62 BPM | OXYGEN SATURATION: 95 % | WEIGHT: 137.19 LBS

## 2025-06-10 DIAGNOSIS — D64.9 ANEMIA, UNSPECIFIED TYPE: ICD-10-CM

## 2025-06-10 DIAGNOSIS — F41.1 GENERALIZED ANXIETY DISORDER: ICD-10-CM

## 2025-06-10 DIAGNOSIS — E78.2 MIXED HYPERLIPIDEMIA: ICD-10-CM

## 2025-06-10 DIAGNOSIS — F32.2 MAJOR DEPRESSIVE DISORDER, SINGLE EPISODE, SEVERE (HCC): ICD-10-CM

## 2025-06-10 DIAGNOSIS — Z78.9 DECREASED ACTIVITIES OF DAILY LIVING (ADL): ICD-10-CM

## 2025-06-10 DIAGNOSIS — Z86.79 HISTORY OF HIGH BLOOD PRESSURE: ICD-10-CM

## 2025-06-10 DIAGNOSIS — J15.9 BACTERIAL PNEUMONIA: ICD-10-CM

## 2025-06-10 DIAGNOSIS — G93.89 BRAIN MASS: ICD-10-CM

## 2025-06-10 DIAGNOSIS — C78.00 BLADDER CANCER METASTASIZED TO LUNG (HCC): Primary | ICD-10-CM

## 2025-06-10 DIAGNOSIS — C67.9 BLADDER CANCER METASTASIZED TO LUNG (HCC): Primary | ICD-10-CM

## 2025-06-10 PROCEDURE — 99308 SBSQ NF CARE LOW MDM 20: CPT | Performed by: NURSE PRACTITIONER

## 2025-06-10 NOTE — ASSESSMENT & PLAN NOTE
BP improving 122/75   Encourage po intake and change positions slowly  Metoprolol 25 mg BID  Simvastatin 40 mg nightly

## 2025-06-10 NOTE — PROGRESS NOTES
Sujey Medina, 3/6/1950, 75 year old, female    Chief Complaint:    Chief Complaint   Patient presents with    Follow - Up     Bladder cancer with mets  S/p craniotomy  Pneumonia - resolved  Urostomy  weakness        Subjective:   TODAY:  Sujey is seen in bed this afternoon. She is tired after therapies today. She denies pain.  DW her improvements with therapies.   She had nausea earlier, zofran prn given, resolved and did not return.     She denies dizziness, lightheadedness.  She denies headaches, vision changes. She has no chest pain, no cough, no shortness of breath.   Appetite still poor, no vomiting, no diarrhea, BM normal. No abdominal pain.   Trying to drink more water and BP improved. Taking vanilla ensure.     DW nursing. NO concerns.     Objective:  /75   Pulse 62   Temp 97.5 °F (36.4 °C)   Resp 14   Wt 137 lb 3.2 oz (62.2 kg)   LMP  (LMP Unknown)   SpO2 95%   BMI 22.83 kg/m²     PHYSICAL EXAM:  GENERAL HEALTH: well developed, well nourished, in no apparent distress  LINES, TUBES, DRAINS:  urostomy  SKIN: pink, warm, dry  WOUND: old craniotomy scar frontal, healing well.   EYES:  sclera anicteric, conjunctiva normal; there is no nystagmus, no drainage from eyes  HENT:  normocephalic; normal nose, no nasal drainage, mucous membranes pink, moist.  NECK:  supple, non tender, FROM  RESPIRATORY: clear, no crackles, no wheezing, no dyspnea, no cough, room air  CARDIOVASCULAR: RRR, S1 and S2, no murmurs, no edema  ABDOMEN:  normal active BS+, soft, nondistended; no organomegaly, no masses; nontender, no guarding, no rebound tenderness.  :no suprapubic distension urostomy dark yellow   MUSCULOSKELETAL: no acute synovitis upper or lower extremity  EXTREMITIES/VASCULAR:radial pulses 2+ and dorsalis pedal pulses 2+  NEUROLOGIC: A&OX3, no focal deficits, follows commands  PSYCHIATRIC: calm, cooperative, mood and affect appropriate to situation    Therapy update:  Transfers: CG to SB assist  ADLS:  LB  and shower max assist, toilet hygiene min assist, UB and other hygiene SBA   Ambulation:  150 ft  with RW and SB assist  DC plan: home with spouse    Medications reviewed: Yes    Medications - Current[1]      Diagnostics reviewed:    Lab Results   Component Value Date    WBC 6.9 06/05/2025    RBC 3.09 (L) 06/05/2025    HGB 10.1 (L) 06/05/2025    HCT 30.6 (L) 06/05/2025    MCV 99.0 06/05/2025    MCH 32.7 06/05/2025    MCHC 33.0 06/05/2025    RDW 15.7 06/05/2025    .0 (L) 06/05/2025     Lab Results   Component Value Date    GLU 95 06/05/2025    BUN 15 06/05/2025    BUNCREA 30.0 (H) 04/08/2022    CREATSERUM 1.14 (H) 06/05/2025    ANIONGAP 10 06/05/2025    GFR >59 02/25/2011    CA 8.8 06/05/2025    OSMOCALC 291 06/05/2025    ALKPHO 95 05/28/2025    AST 34 (H) 05/28/2025    ALT 68 (H) 05/28/2025    BILT 0.4 05/28/2025    TP 6.0 05/28/2025    ALB 3.6 05/28/2025    GLOBULIN 2.4 05/28/2025    AGRATIO 1.0 (L) 08/28/2013     06/05/2025    K 3.9 06/05/2025     06/05/2025    CO2 23.0 06/05/2025         Assessment & Plan  Bladder cancer metastasized to lung (HCC)  Bacterial pneumonia  Brain mass  RVP neg, procal elevated, legionella and strep pneumo negative  Meropenem and vancomycin completed as inpatient  No cough now, still with fatigue, endurance improving   No fevers, no chills, no hypoxia, WBC wnl   CTA was neg for PE, + ground glass opacities     Metastatis, s.p craniotomy and tumor resection on 4/16/25  Concern for subdural empyema   NS and IS saw her inpatient - no surgical intervention at this time, follow up with NS in 1 month  ID follow up as needed  Follow up with oncology and radiation oncology as well on DC  Continues on Keppra 1000 mg BID, levels checked inpatient  No new neuro symptoms.       History of high blood pressure  Mixed hyperlipidemia  BP improving 122/75   Encourage po intake and change positions slowly  Metoprolol 25 mg BID  Simvastatin 40 mg nightly        Generalized anxiety  disorder  Major depressive disorder, single episode, severe (HCC)  Mood stable to improved  Bupropion 200 mg daily  Buspirone 5 mg BID  NO SI, no HI       Anemia, unspecified type  Hgb 10.4, 10.1   B12 and folate were normal 5/15/25  Iron, iron sat and ferritin wnl  Transferrin, TIBC low consistent with anemia of chronic disease  DW patient and spouse   Continue on MVI now       Decreased activities of daily living (ADL)  Physical Deconditioning/Weakness; at risk for falling  Fall Precautions  GENNARO team/ to assist with establishing safe discharge plan for next level of care  PT/OT with improvements, working on endurance   DC planning with MDT, SW, therapies, anticipated DC date : TBD  Services on DC: HHC RN/PT/OT/SLP/SW  Equipment on DC: Wc/walker         Pain management  Tylenol 650 mg PO Q6 prn acute pain     Bowel management  Monitor, BM normal now   Senna-docusate BID prn     Vitamins/supplements as r/t deficiencies  GENNARO RD to follow while in rehab; supplementation/diet as per GENNARO RD  May continue home supplements  MVI daily     At risk for malnutrition  Dietician consult  Weekly weights  Supplements as indicated  Encourage po intake  DW her balanced meals, smaller meals and snacks      DVT Prophylaxis   Encourage exercise and participation with therapy as much as able  No AC with brain tumor     GI prophylaxis  Famotidine      Labs  CBC, CMP weekly and prn     Follow Ups:  PCP within 7 days of DC from rehab  Neurosurgery follow up 6/24/25  Heme/Onc follow up as directed   ID Dr Carlton as needed     Future Appointments   Date Time Provider Department Center   6/24/2025  2:30 PM Candido Daigle MD [facility] [place]       *Established patient; follow-up mildly complex visit/ greater than 20      25 minutes spent w/ patient and staff, including but not limited to/ reviewing present status, needs, abilities with disciplines, reviewing medical records, vital signs, labs, completing medication  reconciliation and entering orders for continued care in [facility].      Note to patient: The 21st Century Cures Act makes medical notes like these available to patients in the interest of transparency. However, this is a medical document intended as peer to peer communication. It is written in medical language and may contain abbreviations or verbiage that are unfamiliar. It may appear blunt or direct. Medical documents are intended to carry relevant information, facts as evident, and the clinical opinion of the practitioner who signs the document.    Rama Tyler, APRN  6/10/2025         [1]   Current Outpatient Medications:     acetaminophen 325 MG Oral Tab, Take 2 tablets (650 mg total) by mouth every 6 (six) hours as needed for Pain., Disp: , Rfl:     senna-docusate 8.6-50 MG Oral Tab, Take 2 tablets by mouth as needed in the morning and 2 tablets as needed in the evening for constipation., Disp: , Rfl:     levETIRAcetam 1000 MG Oral Tab, Take 1 tablet (1,000 mg total) by mouth 2 (two) times daily., Disp: 60 tablet, Rfl: 0    buPROPion HCl ER, SR, 200 MG Oral Tablet 12 Hr, Take 1 tablet (200 mg total) by mouth in the morning., Disp: , Rfl:     busPIRone 5 MG Oral Tab, Take 1 tablet (5 mg total) by mouth in the morning and 1 tablet (5 mg total) before bedtime., Disp: , Rfl:     metoprolol tartrate 25 MG Oral Tab, Take 1 tablet (25 mg total) by mouth 2 (two) times daily., Disp: , Rfl:     acidophilus-pectin Oral Cap, Take 1 capsule by mouth in the morning., Disp: , Rfl:     simvastatin 40 MG Oral Tab, TAKE ONE TABLET (40 MG TOTAL) BY MOUTH NIGHTLY, Disp: 90 tablet, Rfl: 3    Multiple Vitamin (MULTI-VITAMIN DAILY OR), Take 1 tablet by mouth in the morning., Disp: , Rfl:

## 2025-06-10 NOTE — ASSESSMENT & PLAN NOTE
RVP neg, procal elevated, legionella and strep pneumo negative  Meropenem and vancomycin completed as inpatient  No cough now, still with fatigue, endurance improving   No fevers, no chills, no hypoxia, WBC wnl   CTA was neg for PE, + ground glass opacities     Metastatis, s.p craniotomy and tumor resection on 4/16/25  Concern for subdural empyema   NS and IS saw her inpatient - no surgical intervention at this time, follow up with NS in 1 month  ID follow up as needed  Follow up with oncology and radiation oncology as well on DC  Continues on Keppra 1000 mg BID, levels checked inpatient  No new neuro symptoms.

## 2025-06-11 NOTE — PROGRESS NOTES
Sujey Medina, 3/6/1950, 75 year old, female    Chief Complaint:    Chief Complaint   Patient presents with    Follow - Up     Fall today  Metastatic bladder cancer  S/p craniotomy  Hx pneumonia  weakness        Subjective:   TODAY:    Sujey is seen in bed this afternoon after a fall in her room.    She states she stood up on her own and reached down to pick something up, she was unable to stabilze and right herself, so she slowly slid herself down to the floor using the wall.     She has no pain, no injuries noted. She did not hit her head or sit down with force.   She has otherwise felt well today. Thought she was stronger than she is she states.     She denies dizziness, lightheadedness.    She denies headaches, vision changes. She has no chest pain, no cough, no shortness of breath.   Appetite still poor, no vomiting, no diarrhea, BM normal. No abdominal pain.   DW her creat improved, BP improved, she has been doing a good job drinking fluids.     DW nursing.    Objective:  /69   Pulse 91   Temp 98.4 °F (36.9 °C)   Resp 15   Wt 137 lb (62.1 kg)   LMP  (LMP Unknown)   SpO2 96%   BMI 22.80 kg/m²     PHYSICAL EXAM:  GENERAL HEALTH: well developed, well nourished, in no apparent distress  LINES, TUBES, DRAINS:  urostomy  SKIN: pink, warm, dry  WOUND: old craniotomy scar frontal, healing well.   EYES:  sclera anicteric, conjunctiva normal; there is no nystagmus, no drainage from eyes  HENT:  normocephalic; normal nose, no nasal drainage, mucous membranes pink, moist.  NECK:  supple, non tender, FROM  RESPIRATORY: clear, no crackles, no wheezing, no dyspnea, no cough, room air  CARDIOVASCULAR: RRR, S1 and S2, no murmurs, no edema  ABDOMEN:  normal active BS+, soft, nondistended; no organomegaly, no masses; nontender, no guarding, no rebound tenderness.  :no suprapubic distension urostomy clear yellow   MUSCULOSKELETAL: no acute synovitis upper or lower extremity, no deformities noted, non tender  joints.   EXTREMITIES/VASCULAR:radial pulses 2+ and dorsalis pedal pulses 2+  NEUROLOGIC: A&OX3, no focal deficits, follows commands  PSYCHIATRIC: calm, cooperative, mood and affect appropriate to situation    Therapy update:  Transfers: CG to SB assist  ADLS:  LB and shower max assist, toilet hygiene min assist, UB and other hygiene SBA   Ambulation:  150 ft  with RW and SB assist  DC plan: home with spouse    Medications reviewed: Yes    Medications - Current[1]      Diagnostics reviewed:    Lab Results   Component Value Date    WBC 6.7 06/12/2025    RBC 3.16 (L) 06/12/2025    HGB 10.6 (L) 06/12/2025    HCT 31.4 (L) 06/12/2025    MCV 99.4 06/12/2025    MCH 33.5 06/12/2025    MCHC 33.8 06/12/2025    RDW 15.9 06/12/2025    .0 (L) 06/12/2025     Lab Results   Component Value Date    GLU 90 06/12/2025    BUN 16 06/12/2025    BUNCREA 30.0 (H) 04/08/2022    CREATSERUM 1.06 (H) 06/12/2025    ANIONGAP 10 06/12/2025    GFR >59 02/25/2011    CA 8.9 06/12/2025    OSMOCALC 295 06/12/2025    ALKPHO 95 05/28/2025    AST 34 (H) 05/28/2025    ALT 68 (H) 05/28/2025    BILT 0.4 05/28/2025    TP 6.0 05/28/2025    ALB 3.6 05/28/2025    GLOBULIN 2.4 05/28/2025    AGRATIO 1.0 (L) 08/28/2013     06/12/2025    K 4.2 06/12/2025     06/12/2025    CO2 24.0 06/12/2025     Assessment & Plan  Bladder cancer metastasized to lung (HCC)  Brain mass  Metastatis, s.p craniotomy and tumor resection on 4/16/25  Concern for subdural empyema   NS and ID saw her inpatient - no surgical intervention at this time, follow up with NS in 1 month 6/24/25 2:30pm  ID follow up as needed  Follow up with oncology and radiation oncology as well on DC  Continues on Keppra 1000 mg BID, levels checked inpatient, follow up with neurology Will schedule appt  No new neuro symptoms.       Fall, initial encounter  Slid down the wall to the floor  No pain  No injuries noted  She was getting up on her own and was not able to right herself after bending  forward  Monitoring, no head injury  Educated to call for assistance        History of high blood pressure  Mixed hyperlipidemia  /67   Encourage po intake and change positions slowly, call for assist with activities   Metoprolol 25 mg BID  Simvastatin 40 mg nightly        Generalized anxiety disorder  Major depressive disorder, single episode, severe (HCC)  Mood stable, no SI, no HI   Bupropion 200 mg daily  Buspirone 5 mg BID       Anemia, unspecified type  Hgb 10.4, 10.1 , 10.6   B12 and folate were normal 5/15/25  Iron, iron sat and ferritin wnl  Transferrin, TIBC low consistent with anemia of chronic disease  Continues on MVI now        Decreased activities of daily living (ADL)  Physical Deconditioning/Weakness; at risk for falling  Fall Precautions  PT/OT with improvements, working on endurance   DC planning with MDT, SW, therapies, anticipated DC date : TBD pending progress 6/18/25   Services on DC: HHC RN/PT/OT/SLP/SW  Equipment on DC: Wc/walker           Recent bacterial pneumonia  Resolved now  IV abx completed as inpatient  No cough, still some fatigue  No hypoxia, WBC wnl  Follow up imaging as directed     Pain management  Tylenol 650 mg PO Q6 prn acute pain     Bowel management  Monitor, BM normal now   Senna-docusate BID prn     Vitamins/supplements as r/t deficiencies  GENNARO RD to follow while in rehab; supplementation/diet as per GENNARO RD  May continue home supplements  MVI daily     At risk for malnutrition  Dietician consult  Weekly weights  Supplements as indicated  Encourage po intake  DW her balanced meals, smaller meals and snacks      DVT Prophylaxis   Encourage exercise and participation with therapy as much as able  No AC with brain tumor     GI prophylaxis  Famotidine      Labs  CBC, CMP weekly and prn     Follow Ups:  PCP within 7 days of DC from rehab  Neurosurgery follow up 6/24/25  Neurology follow up Dr Bruce or Jagdeep for keppra management   Heme/Onc follow up as directed   ID   Brandt as needed     Future Appointments   Date Time Provider Department [place]   6/24/2025  2:30 PM Candido Daigle MD [facility] [place]         *Established patient; follow-up moderately complex visit/ greater than 30     35 minutes spent w/ patient and staff, including but not limited to/ reviewing present status, needs, abilities with disciplines, reviewing medical records, vital signs, labs, completing medication reconciliation and entering orders for continued care in [facility].    Note to patient: The 21st Century Cures Act makes medical notes like these available to patients in the interest of transparency. However, this is a medical document intended as peer to peer communication. It is written in medical language and may contain abbreviations or verbiage that are unfamiliar. It may appear blunt or direct. Medical documents are intended to carry relevant information, facts as evident, and the clinical opinion of the practitioner who signs the document.    Rama Tyler, APRN  6/12/2025         [1]   Current Outpatient Medications:     acetaminophen 325 MG Oral Tab, Take 2 tablets (650 mg total) by mouth every 6 (six) hours as needed for Pain., Disp: , Rfl:     senna-docusate 8.6-50 MG Oral Tab, Take 2 tablets by mouth as needed in the morning and 2 tablets as needed in the evening for constipation., Disp: , Rfl:     buPROPion HCl ER, SR, 200 MG Oral Tablet 12 Hr, Take 1 tablet (200 mg total) by mouth in the morning., Disp: , Rfl:     busPIRone 5 MG Oral Tab, Take 1 tablet (5 mg total) by mouth in the morning and 1 tablet (5 mg total) before bedtime., Disp: , Rfl:     metoprolol tartrate 25 MG Oral Tab, Take 1 tablet (25 mg total) by mouth 2 (two) times daily., Disp: , Rfl:     acidophilus-pectin Oral Cap, Take 1 capsule by mouth in the morning., Disp: , Rfl:     simvastatin 40 MG Oral Tab, TAKE ONE TABLET (40 MG TOTAL) BY MOUTH NIGHTLY, Disp: 90 tablet, Rfl: 3    Multiple Vitamin (MULTI-VITAMIN DAILY  OR), Take 1 tablet by mouth in the morning., Disp: , Rfl:

## 2025-06-11 NOTE — ASSESSMENT & PLAN NOTE
/67   Encourage po intake and change positions slowly, call for assist with activities   Metoprolol 25 mg BID  Simvastatin 40 mg nightly

## 2025-06-11 NOTE — ASSESSMENT & PLAN NOTE
Metastatis, s.p craniotomy and tumor resection on 4/16/25  Concern for subdural empyema   NS and ID saw her inpatient - no surgical intervention at this time, follow up with NS in 1 month 6/24/25 2:30pm  ID follow up as needed  Follow up with oncology and radiation oncology as well on DC  Continues on Keppra 1000 mg BID, levels checked inpatient, follow up with neurology Will schedule appt  No new neuro symptoms.

## 2025-06-12 ENCOUNTER — SNF VISIT (OUTPATIENT)
Dept: INTERNAL MEDICINE CLINIC | Age: 75
End: 2025-06-12

## 2025-06-12 VITALS
OXYGEN SATURATION: 96 % | RESPIRATION RATE: 15 BRPM | DIASTOLIC BLOOD PRESSURE: 69 MMHG | WEIGHT: 137 LBS | SYSTOLIC BLOOD PRESSURE: 103 MMHG | BODY MASS INDEX: 23 KG/M2 | HEART RATE: 91 BPM | TEMPERATURE: 98 F

## 2025-06-12 DIAGNOSIS — C67.9 BLADDER CANCER METASTASIZED TO LUNG (HCC): Primary | ICD-10-CM

## 2025-06-12 DIAGNOSIS — D64.9 ANEMIA, UNSPECIFIED TYPE: ICD-10-CM

## 2025-06-12 DIAGNOSIS — Z86.79 HISTORY OF HIGH BLOOD PRESSURE: ICD-10-CM

## 2025-06-12 DIAGNOSIS — C78.00 BLADDER CANCER METASTASIZED TO LUNG (HCC): Primary | ICD-10-CM

## 2025-06-12 DIAGNOSIS — Z78.9 DECREASED ACTIVITIES OF DAILY LIVING (ADL): ICD-10-CM

## 2025-06-12 DIAGNOSIS — E78.2 MIXED HYPERLIPIDEMIA: ICD-10-CM
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
## 2025-06-12 DIAGNOSIS — F32.2 MAJOR DEPRESSIVE DISORDER, SINGLE EPISODE, SEVERE (HCC): ICD-10-CM

## 2025-06-12 DIAGNOSIS — F41.1 GENERALIZED ANXIETY DISORDER: ICD-10-CM

## 2025-06-12 DIAGNOSIS — G93.89 BRAIN MASS: ICD-10-CM

## 2025-06-12 PROCEDURE — 99308 SBSQ NF CARE LOW MDM 20: CPT | Performed by: NURSE PRACTITIONER

## 2025-06-15 ENCOUNTER — APPOINTMENT (OUTPATIENT)
Dept: CT IMAGING | Facility: HOSPITAL | Age: 75
End: 2025-06-15
Attending: EMERGENCY MEDICINE
Payer: MEDICARE

## 2025-06-15 ENCOUNTER — HOSPITAL ENCOUNTER (EMERGENCY)
Facility: HOSPITAL | Age: 75
Discharge: HOME | End: 2025-06-15
Attending: EMERGENCY MEDICINE
Payer: MEDICARE

## 2025-06-15 VITALS
OXYGEN SATURATION: 100 % | HEART RATE: 86 BPM | DIASTOLIC BLOOD PRESSURE: 76 MMHG | SYSTOLIC BLOOD PRESSURE: 125 MMHG | RESPIRATION RATE: 27 BRPM | TEMPERATURE: 98 F

## 2025-06-15 DIAGNOSIS — G40.909 SEIZURE DISORDER (HCC): Primary | ICD-10-CM

## 2025-06-15 LAB
ALBUMIN SERPL-MCNC: 4.4 G/DL (ref 3.2–4.8)
ALBUMIN/GLOB SERPL: 1.6 {RATIO} (ref 1–2)
ALP LIVER SERPL-CCNC: 77 U/L (ref 55–142)
ALT SERPL-CCNC: 16 U/L (ref 10–49)
ALT SERPL-CCNC: 18 U/L (ref 10–49)
ANION GAP SERPL CALC-SCNC: 12 MMOL/L (ref 0–18)
AST SERPL-CCNC: 21 U/L (ref ?–34)
BASOPHILS # BLD AUTO: 0.12 X10(3) UL (ref 0–0.2)
BASOPHILS NFR BLD AUTO: 1.5 %
BILIRUB SERPL-MCNC: 0.4 MG/DL (ref 0.2–1.1)
BUN BLD-MCNC: 10 MG/DL (ref 9–23)
BUN BLD-MCNC: 11 MG/DL (ref 9–23)
CALCIUM BLD-MCNC: 9.1 MG/DL (ref 8.7–10.6)
CHLORIDE SERPL-SCNC: 108 MMOL/L (ref 98–112)
CO2 SERPL-SCNC: 19 MMOL/L (ref 21–32)
CREAT BLD-MCNC: 1.36 MG/DL (ref 0.55–1.02)
EGFRCR SERPLBLD CKD-EPI 2021: 40 ML/MIN/1.73M2 (ref 60–?)
EOSINOPHIL # BLD AUTO: 0.33 X10(3) UL (ref 0–0.7)
EOSINOPHIL NFR BLD AUTO: 4.2 %
ERYTHROCYTE [DISTWIDTH] IN BLOOD BY AUTOMATED COUNT: 16 %
GLOBULIN PLAS-MCNC: 2.8 G/DL (ref 2–3.5)
GLUCOSE BLD-MCNC: 99 MG/DL (ref 70–99)
HCT VFR BLD AUTO: 33.1 % (ref 35–48)
HGB BLD-MCNC: 11.2 G/DL (ref 12–16)
IMM GRANULOCYTES # BLD AUTO: 0.15 X10(3) UL (ref 0–1)
IMM GRANULOCYTES NFR BLD: 1.9 %
LYMPHOCYTES # BLD AUTO: 1.39 X10(3) UL (ref 1–4)
LYMPHOCYTES NFR BLD AUTO: 17.8 %
MCH RBC QN AUTO: 33.3 PG (ref 26–34)
MCHC RBC AUTO-ENTMCNC: 33.8 G/DL (ref 31–37)
MCV RBC AUTO: 98.5 FL (ref 80–100)
MONOCYTES # BLD AUTO: 1.17 X10(3) UL (ref 0.1–1)
MONOCYTES NFR BLD AUTO: 15 %
NEUTROPHILS # BLD AUTO: 4.66 X10 (3) UL (ref 1.5–7.7)
NEUTROPHILS # BLD AUTO: 4.66 X10(3) UL (ref 1.5–7.7)
NEUTROPHILS NFR BLD AUTO: 59.6 %
OSMOLALITY SERPL CALC.SUM OF ELEC: 287 MOSM/KG (ref 275–295)
PLATELET # BLD AUTO: 128 10(3)UL (ref 150–450)
PLATELETS.RETICULATED NFR BLD AUTO: 2.5 % (ref 0–7)
POTASSIUM SERPL-SCNC: 4 MMOL/L (ref 3.5–5.1)
PROT SERPL-MCNC: 7.2 G/DL (ref 5.7–8.2)
RBC # BLD AUTO: 3.36 X10(6)UL (ref 3.8–5.3)
SODIUM SERPL-SCNC: 139 MMOL/L (ref 136–145)
WBC # BLD AUTO: 7.8 X10(3) UL (ref 4–11)

## 2025-06-15 PROCEDURE — 80053 COMPREHEN METABOLIC PANEL: CPT | Performed by: EMERGENCY MEDICINE

## 2025-06-15 PROCEDURE — 85025 COMPLETE CBC W/AUTO DIFF WBC: CPT | Performed by: EMERGENCY MEDICINE

## 2025-06-15 PROCEDURE — 99284 EMERGENCY DEPT VISIT MOD MDM: CPT

## 2025-06-15 PROCEDURE — 70450 CT HEAD/BRAIN W/O DYE: CPT | Performed by: EMERGENCY MEDICINE

## 2025-06-15 PROCEDURE — 96374 THER/PROPH/DIAG INJ IV PUSH: CPT

## 2025-06-15 PROCEDURE — 99285 EMERGENCY DEPT VISIT HI MDM: CPT

## 2025-06-15 PROCEDURE — 80177 DRUG SCRN QUAN LEVETIRACETAM: CPT | Performed by: EMERGENCY MEDICINE

## 2025-06-15 RX ORDER — LEVETIRACETAM 750 MG/1
750 TABLET ORAL 2 TIMES DAILY
Qty: 60 TABLET | Refills: 0 | Status: SHIPPED | OUTPATIENT
Start: 2025-06-15 | End: 2025-07-15

## 2025-06-15 RX ORDER — LEVETIRACETAM 500 MG/5ML
1500 INJECTION, SOLUTION, CONCENTRATE INTRAVENOUS ONCE
Status: COMPLETED | OUTPATIENT
Start: 2025-06-15 | End: 2025-06-15

## 2025-06-15 NOTE — ED INITIAL ASSESSMENT (HPI)
Seizure witnessed by family, began absent then began to convulse, unknown time duration   Post icle in appearence and communication. Hx of same post brain surg last month

## 2025-06-15 NOTE — ED PROVIDER NOTES
Patient Seen in: [facility] Emergency Department        History  Chief Complaint   Patient presents with    Seizure Disorder     Stated Complaint:     Subjective:   HPI            Patient is a 75-year-old female who is status post left frontal craniotomy for metastatic disease resection on April 16.  Per reports she had seizures postoperatively and was on Keppra at least for some time.  She had a repeat admission here about a month ago for pneumonia.  She is back at the nursing home recovering, apparently her family was visiting today and they were sitting in the patio when she was noted to have gaze deviation to the right and then an episode of generalized tonic-clonic seizure-like activity.  Duration of this is not provided to paramedics and family is not yet here to provide history.  She did not have seizure-like activity when paramedics arrived and on arrival here she is awake and alert.  She denies any complaints.      Objective:     No pertinent past medical history.            No pertinent past surgical history.              No pertinent social history.                              Physical Exam    ED Triage Vitals   BP 06/15/25 1216 127/78   Pulse 06/15/25 1215 99   Resp 06/15/25 1215 18   Temp 06/15/25 1216 97.9 °F (36.6 °C)   Temp src 06/15/25 1216 Oral   SpO2 06/15/25 1215 100 %   O2 Device 06/15/25 1215 None (Room air)       Current Vitals:   Vital Signs  BP: 115/66  Pulse: 85  Resp: 25  Temp: 97.9 °F (36.6 °C)  Temp src: Oral  MAP (mmHg): 82    Oxygen Therapy  SpO2: 100 %  O2 Device: None (Room air)            Physical Exam  Vitals and nursing note reviewed.   Constitutional:       Appearance: She is well-developed.   HENT:      Head: Normocephalic and atraumatic.      Comments: Right frontal scalp incision looks good, clean/dry/intact without erythema, drainage or dehiscence.  Eyes:      Conjunctiva/sclera: Conjunctivae normal.      Pupils: Pupils are equal, round, and reactive to light.    Cardiovascular:      Rate and Rhythm: Normal rate and regular rhythm.      Heart sounds: Normal heart sounds.   Pulmonary:      Effort: Pulmonary effort is normal.      Breath sounds: Normal breath sounds.   Abdominal:      General: Bowel sounds are normal.      Palpations: Abdomen is soft.   Musculoskeletal:         General: Normal range of motion.      Cervical back: Normal range of motion and neck supple.   Skin:     General: Skin is warm and dry.   Neurological:      Mental Status: She is alert.      Comments: Patient is aware she is at the hospital although she thinks it is April 1950.  Cranial nerves are intact, no facial droop and her speech is fluent and clear.  Strength is 5 out of 5 bilateral upper and lower extremities.  There is no focal strength deficit.                 ED Course  Labs Reviewed   COMP METABOLIC PANEL (14) - Abnormal; Notable for the following components:       Result Value    CO2 19.0 (*)     Creatinine 1.36 (*)     eGFR-Cr 40 (*)     All other components within normal limits   CBC WITH DIFFERENTIAL WITH PLATELET - Abnormal; Notable for the following components:    RBC 3.36 (*)     HGB 11.2 (*)     HCT 33.1 (*)     .0 (*)     Monocyte Absolute 1.17 (*)     All other components within normal limits   REDRAW CMP (P) - Normal   SCAN SLIDE   LEVETIRACETAM, S   RAINBOW DRAW BLUE          CT BRAIN OR HEAD (CPT=70450)  Result Date: 6/15/2025  PROCEDURE:  CT BRAIN OR HEAD (39162)  COMPARISON:  [facility] , CT, CT BRAIN OR HEAD (14962), 5/12/2025, 6:52 PM.  INDICATIONS:  Craniotomy 2 months ago, seizure today  TECHNIQUE:  Noncontrast CT scanning is performed through the brain. Dose reduction techniques were used. Dose information is transmitted to the ACR (American College of Radiology) NRDR (National Radiology Data Registry) which includes the Dose Index Registry.  PATIENT STATED HISTORY: (As transcribed by Technologist)  Craniotomy 2 months ago, seizure today.    FINDINGS: Volume loss is  noted.  Encephalomalacia in the left frontal lobe is noted.  Stable dural thickening along the left frontal craniotomy appears stable measuring 6 millimeters (image 28).  Ventricles are within normal limits.  There is no midline shift or mass-effect.  The basal cisterns are patent.  The gray-white matter differentiation is intact.  There is no acute intracranial hemorrhage or extra-axial fluid collection.  Hypodensities in the periventricular and subcortical white matter is compatible with chronic small vessel disease.  There is no evident fracture.  Small fluid level in left maxillary sinus.  Mastoid air cells are clear.            CONCLUSION:  1. Stable postsurgical changes in the left frontal lobe. 2. No acute intracranial abnormality    LOCATION:  [facility]   Dictated by (CST): Eduardo Alejandra MD on 6/15/2025 at 1:58 PM     Finalized by (CST): Eduardo Alejandra MD on 6/15/2025 at 2:01 PM                         MDM     75-year-old female presenting with a sounds like a seizure.  Has noted craniotomy 2 months ago and reportedly had some seizure activity afterwards.  She was on Keppra at least for some time but it is not listed on her current medication list from the nursing home.  Will try to review old records to see if this was appropriately discontinued if there was some sort of error misunderstanding and she is still supposed to be on that.  Will hold off giving her dose now until family arrives to provide further history.    Update at 3:20 PM.  Labs are unremarkable.  Maybe little bit of dehydration likely due to the seizure.  CT as above demonstrates postsurgical changes but no evidence of edema or bleeding.  Patient remains awake and alert here.  Called the nursing home and it turns out her last dose of Keppra was given 4 days ago on June 11.  I cannot find any notes that have recommendations to discontinue this and I think this is probably either an oversight or error of some kind.  This certainly explains her  seizure today.  Case discussed with Dr. Banda of neurology as well as her neurosurgeon Dr. Parra.  Both are fine with her being discharged back.  Neurology recommends 1500 Keppra IV now which has been given and then restarting at 750 twice a day with the first dose coming tonight.  Discussed this with the patient as well as her  at bedside and they are comfortable with the plan as well.      Past Medical History-hypertension, high cholesterol, bladder cancer    Differential diagnosis before testing included cerebral edema, cerebral hemorrhage    Co-morbidities that add to the complexity of management include: None    Testing ordered during this visit included labs, CT brain    Radiographic images  I personally reviewed the radiographs and my individual interpretation shows postsurgical changes, no edema or bleed  I also reviewed the official reports that showed postsurgical changes, no edema or bleed    External chart review showed reviewed inpatient notes, prior radiology results    History obtained by an independent source included from  at bedside, nursing home    Discussion of management with neurology, neurosurgery      Medications Provided: Keppra            Disposition:        Discharge  I have discussed with the patient the results of test, differential diagnosis, treatment plan, warning signs and symptoms which should prompt immediate return.  They expressed understanding of these instructions and agrees to the following plan provided.  They were given written discharge instructions and agrees to return for any concerns and voiced understanding and all questions were answered.      Medical Decision Making      Disposition and Plan     Clinical Impression:  1. Seizure disorder (HCC)         Disposition:  Discharge  6/15/2025  3:26 pm    Follow-up:  Rajni Tiwari MD  [address] [place] [address] [address]  [facility] [address]  [phone]    Follow up      Candido Daigle MD  [address]  [address] [place] [id number]  [phone]    Follow up            Medications Prescribed:  Current Discharge Medication List                Supplementary Documentation:

## 2025-06-15 NOTE — ED QUICK NOTES
Upon arrival, pt noted to have several EKG stickers to chest/abdomen from EMS use. Excess stickers removed and with removal of one sticker to left chest, pt experienced a skin tear. Gauze and paper tape applied to area. HAYDE Blackman aware and will clean and redress. Pt denies any sensitivity to adhesive in past.

## 2025-06-15 NOTE — ED QUICK NOTES
Rounding Completed    Plan of Care reviewed. Waiting for imaging results.  Elimination needs assessed.  Provided update on POC.    Bed is locked and in lowest position. Call light within reach.

## 2025-06-16 ENCOUNTER — SNF DISCHARGE (OUTPATIENT)
Dept: INTERNAL MEDICINE CLINIC | Age: 75
End: 2025-06-16

## 2025-06-16 DIAGNOSIS — E78.2 MIXED HYPERLIPIDEMIA: ICD-10-CM

## 2025-06-16 DIAGNOSIS — Z86.79 HISTORY OF HIGH BLOOD PRESSURE: ICD-10-CM

## 2025-06-16 DIAGNOSIS — G93.89 BRAIN MASS: ICD-10-CM

## 2025-06-16 DIAGNOSIS — R56.9 SEIZURE (HCC): Primary | ICD-10-CM

## 2025-06-16 DIAGNOSIS — F32.2 MAJOR DEPRESSIVE DISORDER, SINGLE EPISODE, SEVERE (HCC): ICD-10-CM

## 2025-06-16 DIAGNOSIS — F41.1 GENERALIZED ANXIETY DISORDER: ICD-10-CM

## 2025-06-16 DIAGNOSIS — C78.00 BLADDER CANCER METASTASIZED TO LUNG (HCC): ICD-10-CM

## 2025-06-16 DIAGNOSIS — J15.9 BACTERIAL PNEUMONIA: ICD-10-CM

## 2025-06-16 DIAGNOSIS — C67.9 BLADDER CANCER METASTASIZED TO LUNG (HCC): ICD-10-CM

## 2025-06-16 DIAGNOSIS — D64.9 ANEMIA, UNSPECIFIED TYPE: ICD-10-CM

## 2025-06-16 PROCEDURE — 99316 NF DSCHRG MGMT 30 MIN+: CPT | Performed by: NURSE PRACTITIONER

## 2025-06-17 VITALS
SYSTOLIC BLOOD PRESSURE: 105 MMHG | RESPIRATION RATE: 16 BRPM | OXYGEN SATURATION: 99 % | TEMPERATURE: 98 F | WEIGHT: 137.13 LBS | HEART RATE: 100 BPM | DIASTOLIC BLOOD PRESSURE: 71 MMHG | BODY MASS INDEX: 23 KG/M2

## 2025-06-17 NOTE — ASSESSMENT & PLAN NOTE
Sujey had a seizure this weekend  She went out to ER  Her Keppra had been stopped 6/11 per previous hospital instruction although it was to be continued until neuro follow up   ER resumed her Keppra at 750 mg BID after IV loading dose of 1500 Keppra   She has neurosurgery follow up 6/24/25  CT with postsurgical changes.  Follow up with Dr Solo hawkins as well

## 2025-06-17 NOTE — ASSESSMENT & PLAN NOTE
Metastatis, s/p craniotomy and tumor resection on 4/16/25  Concern for subdural empyema   Follow up with NS in 1 month 6/24/25 2:30pm  Follow up with oncology and radiation oncology as well on DC  Seizure as above ER DW neuro and Neurosurg  On Keppra 750 mg BID now  Follow up as directed

## 2025-06-17 NOTE — PROGRESS NOTES
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  S/p craniotomy and frontal tumor resection 25  Bacterial pneumonia  Concern for subdural empyema near resection site  Falls recurrent  Weakness  Dizziness  Date of Admission to [facility]  Date of Discharge from SNF: 25  Skilled nursing facility attending: Dr Arlene Conley   Primary Care Physician: Rajni Tiwari MD  Assessment & Plan  Seizure (HCC)  Sujey had a seizure this weekend  She went out to ER  Her Keppra had been stopped  per previous hospital instruction although it was to be continued until neuro follow up   ER resumed her Keppra at 750 mg BID after IV loading dose of 1500 Keppra   She has neurosurgery follow up 25  CT with postsurgical changes.  Follow up with Dr Solo hawkins as well   Bladder cancer metastasized to lung (HCC)  Brain mass  Metastatis, s/p craniotomy and tumor resection on 25  Concern for subdural empyema   Follow up with NS in 1 month 25 2:30pm  Follow up with oncology and radiation oncology as well on DC  Seizure as above ER DW neuro and Neurosurg  On Keppra 750 mg BID now  Follow up as directed   Bacterial pneumonia  Resolved now  Completed antibiotics  Anemia, unspecified type  Hgb 10.4, 10.1 , 10.6 , 10.3  B12 and folate were normal 5/15/25  Iron, iron sat and ferritin wnl  Transferrin, TIBC low consistent with anemia of chronic disease  Continues on MVI now   Major depressive disorder, single episode, severe (HCC)  Generalized anxiety disorder  Mood stable, no SI, no HI   Bupropion 200 mg daily  Buspirone 5 mg BID  History of high blood pressure  Mixed hyperlipidemia  /71  Metoprolol 25 mg BID  Simvastatin 40 mg nightly  Reviewed increase po  fluid  intake and change positions slowly    Pain management  Tylenol 650 mg PO Q6 prn acute pain      Bowel management  Monitor, BM normal now   Senna-docusate BID prn     Vitamins/supplements as r/t deficiencies  GENNARO RD to follow while in rehab; supplementation/diet as per GENNARO RD  May continue home supplements  MVI daily     At risk for malnutrition  Dietician consult  Weekly weights  Supplements as indicated  Encourage po intake  DW her balanced meals, smaller meals and snacks      DVT Prophylaxis   Encourage exercise and participation with therapy as much as able  No AC with brain tumor     GI prophylaxis  Famotidine      Labs  CBC, CMP weekly and prn     Follow Ups:  PCP within 7 days of DC from rehab  Neurosurgery follow up 6/24/25  Neurology follow up for keppra management   Heme/Onc follow up as directed   ID Dr Carlton as needed       Code Status: Full Code  Discharge to: Home with [facility]  Home Health Services ordered: Home Health Aide, Occupational Therapy, Physical Therapy, and RN  Mobility assessment: Walker and Wheelchair    Reliable Support: spouse / significant other  Diet:Regular diet and Thin liquids    Your appointments       Date & Time Appointment Department ([place])    Jun 24, 2025 2:30 PM CDT Neuro Clinic Consult with Candido Daigle MD [facility])              [address] [place] [place] [id number]  [phone]          [facility]    Hospital Summary:  Sujey Medina is a 75 year old female with previous medical history including HTN, HLD, CKDIII, GERD,Depression, bladder cancer stage IV with mets to lungs and brain s/p craniotomy for tumor resection on 4/16/25. She was admitted to the hospital for weakness and falls. She was admitted for the craniotomy 4/10-. 4/20/25. She discharged to home. She  developed shortness of breath and weakness. She came back to [facility] 5/12 with fall and was evaluated and sent home. She then went to [facility] ER with persistent shortness of breath with cough and falls/weakness and fatigue. MRI there was concerning for subdural empyema so she was admitted and NS and ID consulted for IV abx. She was then transferred to [facility] for continuity of care with NS. She was treated for bacterial pneumonia with meropenem and vancomycin. CTA chest negative for PE with with ground glass opacities. 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 Had craniotomy on 4/16, NS, oncology and rad onc following. Keppra continued. No surgical intervention at this time. Plan follow up in 2 weeks. Orthostatic hypotension improved during hospitalization and likely contributed to dizziness and falls.  CTH and keppra level OK. Now admitted to SNF for sub-acute rehabilitation.      Skilled Nursing Facility Summary:  Sujey has done well in subacute rehab. She recovered from her pneumonia and is weaned off oxygen. She improved with therapies on her strength, endurance and ADLS.   She had two falls without injuries. DW her need assist of at least stand by to prevent falls at home. She had a seizure on Sunday, was off her Keppra, now resumed at 750 mg BID and will follow up with neurosurgery on 6/24/25.  She is going home with her spouse and with home health care to follow.        Medication Changes During skilled nursing facility stay:     Medication List            Accurate as of June 16, 2025 11:59 PM. If you have any questions, ask your nurse or doctor.                CONTINUE taking these medications      acetaminophen 325 MG Tabs  Commonly known as: Tylenol     acidophilus-pectin Caps  Commonly known as: Probiotic     buPROPion HCl ER (SR) 200 MG Tb12  Commonly known as: WELLBUTRIN SR     busPIRone 5 MG Tabs  Commonly known as: Buspar     levetiracetam  750 MG Tabs  Commonly known as: KEPPRA  Take 1 tablet (750 mg total) by mouth 2 (two) times daily.     metoprolol tartrate 25 MG Tabs  Commonly known as: Lopressor     MULTI-VITAMIN DAILY OR     senna-docusate 8.6-50 MG Tabs  Commonly known as: Senokot-S     simvastatin 40 MG Tabs  Commonly known as: Zocor  TAKE ONE TABLET (40 MG TOTAL) BY MOUTH NIGHTLY                 Objective    Vitals  /71   Pulse 100   Temp 97.7 °F (36.5 °C)   Resp 16   Wt 137 lb 1.6 oz (62.2 kg)   LMP  (LMP Unknown)   SpO2 99%   BMI 22.81 kg/m²       Physical Exam  Constitutional:       Appearance: Normal appearance. She is well-developed, well-groomed and normal weight.   HENT:      Head: Normocephalic.      Comments: Old craniotomy scar frontal behind hairline. Clean and approximated, healing.      Right Ear: Hearing normal.      Nose: Nose normal.      Mouth/Throat:      Lips: Pink.      Mouth: Mucous membranes are moist.      Pharynx: Oropharynx is clear.   Eyes:      General: Lids are normal. Vision grossly intact.      Conjunctiva/sclera: Conjunctivae normal.      Pupils: Pupils are equal, round, and reactive to light.   Cardiovascular:      Rate and Rhythm: Normal rate and regular rhythm.      Pulses: Normal pulses.           Radial pulses are 2+ on the right side and 2+ on the left side.        Dorsalis pedis pulses are 2+ on the right side and 2+ on the left side.      Heart sounds: Normal heart sounds.   Pulmonary:      Effort: Pulmonary effort is normal.      Breath sounds: Normal breath sounds and air entry.   Abdominal:      General: Abdomen is flat. Bowel sounds are normal.      Palpations: Abdomen is soft.   Musculoskeletal:         General: Normal range of motion.      Cervical back: Normal range of motion.      Right lower leg: No edema.      Left lower leg: No edema.   Skin:     General: Skin is warm and dry.      Comments: Chest with skin tear from EKG electrode, dressing clean and intact   Neurological:       General: No focal deficit present.      Mental Status: She is alert and oriented to person, place, and time. Mental status is at baseline.   Psychiatric:         Mood and Affect: Mood normal.         Behavior: Behavior normal.          Most Recent Labs:  Lab Results   Component Value Date    WBC 7.3 06/17/2025    RBC 3.11 (L) 06/17/2025    HGB 10.3 (L) 06/17/2025    HCT 32.0 (L) 06/17/2025    .9 (H) 06/17/2025    MCH 33.1 06/17/2025    MCHC 32.2 06/17/2025    RDW 16.0 06/17/2025    .0 (L) 06/17/2025     Lab Results   Component Value Date    GLU 91 06/17/2025    BUN 16 06/17/2025    BUNCREA 30.0 (H) 04/08/2022    CREATSERUM 1.04 (H) 06/17/2025    ANIONGAP 9 06/17/2025    GFR >59 02/25/2011    CA 9.0 06/17/2025    OSMOCALC 293 06/17/2025    ALKPHO 77 06/15/2025    AST 21 06/15/2025    ALT 16 06/15/2025    BILT 0.4 06/15/2025    TP 7.2 06/15/2025    ALB 4.4 06/15/2025    GLOBULIN 2.8 06/15/2025    AGRATIO 1.0 (L) 08/28/2013     06/17/2025    K 4.1 06/17/2025     06/17/2025    CO2 23.0 06/17/2025       Time Spent: 35 minutes.  This includes but is not limited to direct patient care, reviewing this patient's vitals, labs and imaging studies, discussing in multidisciplinary rounds and with other members of the health care team as well as updating family members.       Rama Tyler, APRN  6/17/25

## 2025-06-17 NOTE — ASSESSMENT & PLAN NOTE
/71  Metoprolol 25 mg BID  Simvastatin 40 mg nightly  Reviewed increase po  fluid intake and change positions slowly

## 2025-06-18 LAB — LEVETIRACETAM LVL: <2 UG/ML

## 2025-06-24 ENCOUNTER — OFFICE VISIT (OUTPATIENT)
Dept: NEUROLOGY | Facility: CLINIC | Age: 75
End: 2025-06-24
Payer: MEDICARE

## 2025-06-24 VITALS
BODY MASS INDEX: 22.82 KG/M2 | HEART RATE: 92 BPM | DIASTOLIC BLOOD PRESSURE: 60 MMHG | WEIGHT: 137 LBS | HEIGHT: 65 IN | OXYGEN SATURATION: 96 % | SYSTOLIC BLOOD PRESSURE: 102 MMHG

## 2025-06-24 DIAGNOSIS — C79.31 METASTATIC CANCER TO BRAIN (HCC): ICD-10-CM

## 2025-06-24 DIAGNOSIS — C78.00 BLADDER CANCER METASTASIZED TO LUNG (HCC): Primary | ICD-10-CM

## 2025-06-24 DIAGNOSIS — C67.9 BLADDER CANCER METASTASIZED TO LUNG (HCC): Primary | ICD-10-CM

## 2025-06-24 PROCEDURE — 99024 POSTOP FOLLOW-UP VISIT: CPT | Performed by: NEUROLOGICAL SURGERY

## 2025-06-24 RX ORDER — BUPROPION HYDROCHLORIDE 100 MG/1
100 TABLET, EXTENDED RELEASE ORAL DAILY
COMMUNITY
Start: 2025-06-18

## 2025-06-24 NOTE — PROGRESS NOTES
Established patient:  Reason for follow up:   Sx 04/16/25    Numeric Rating Scale:        Pain at Present: 0/10

## 2025-06-24 NOTE — PROGRESS NOTES
Neurosurgery Clinic Visit  2025    Sujey Medina PCP:  Rajni Tiwari MD    3/6/1950 MRN [id number]     HISTORY OF PRESENT ILLNESS:  Sujey Medina is a(n) 75 year old female here for follow-up status post resection of brain met  She is doing great  Still having some lung issues  She just saw pulmonology  She is scheduled get set up with radiation soon        PHYSICAL EXAMINATION:  Vital Signs:  /60   Pulse 92   Ht 65\"   Wt 137 lb (62.1 kg)   LMP  (LMP Unknown)   SpO2 96%   BMI 22.80 kg/m²   Awake alert, oriented 3  Follows commands x 4  Incision looks great, she does look glue on it        REVIEW OF STUDIES:    Preop and postop MRIs reviewed      ASSESSMENT and PLAN:  75-year-old female status post resection of brain met  She is doing great  She is post get radiation with duly here soon  Instructed them the scrub the wound lightly with soap and red  Will see her back in 3 months just to check on her since she is not in the [facility] system  All questions were answered  Also spoke with nursing supervisor  Will get her in with neurology for follow-up regarding her Keppra  Patient and  appreciative        Time spent on counseling/coordination of care:  15 Minutes    Total time spent with patient:  15 Minutes      Candido Daigle MD   [facility]  2025  3:05 PM   Dictated but not proofread

## 2025-06-24 NOTE — PATIENT INSTRUCTIONS
Refill policies:    Allow 2-3 business days for refills; controlled substances may take longer.  Contact your pharmacy at least 5 days prior to running out of medication and have them send an electronic request or submit request through the “request refill” option in your [facility] account.  Refills are not addressed on weekends; covering physicians do not authorize routine medications on weekends.  No narcotics or controlled substances are refilled after noon on Fridays or by on call physicians.  By law, narcotics must be electronically prescribed.  A 30 day supply with no refills is the maximum allowed.  If your prescription is due for a refill, you may be due for a follow up appointment.  To best provide you care, patients receiving routine medications need to be seen at least once a year.  Patients receiving narcotic/controlled substance medications need to be seen at least once every 3 months.  In the event that your preferred pharmacy does not have the requested medication in stock (e.g. Backordered), it is your responsibility to find another pharmacy that has the requested medication available.  We will gladly send a new prescription to that pharmacy at your request.    Scheduling Tests:    If your physician has ordered radiology tests such as MRI or CT scans, please contact Central Scheduling at [phone] right away to schedule the test.  Once scheduled, the [facility] Centralized Referral Team will work with your insurance carrier to obtain pre-certification or prior authorization.  Depending on your insurance carrier, approval may take 3-10 days.  It is highly recommended patients assure they have received an authorization before having a test performed.  If test is done without insurance authorization, patient may be responsible for the entire amount billed.      Precertification and Prior Authorizations:  If your physician has recommended that you have a procedure or additional testing performed the [facility]  Centralized Referral Team will contact your insurance carrier to obtain pre-certification or prior authorization.    You are strongly encouraged to contact your insurance carrier to verify that your procedure/test has been approved and is a COVERED benefit.  Although the [facility] Centralized Referral Team does its due diligence, the insurance carrier gives the disclaimer that \"Although the procedure is authorized, this does not guarantee payment.\"    Ultimately the patient is responsible for payment.   Thank you for your understanding in this matter.  Paperwork Completion:  If you require FMLA or disability paperwork for your recovery, please make sure to either drop it off or have it faxed to our office at [phone]. Be sure the form has your name and date of birth on it.  The form will be faxed to our Forms Department and they will complete it for you.  There is a 25$ fee for all forms that need to be filled out.  Please be aware there is a 10-14 day turnaround time.  You will need to sign a release of information (CHRISTIAN) form if your paperwork does not come with one.  You may call the Forms Department with any questions at [phone].  Their fax number is [phone].

## 2025-06-27 ENCOUNTER — OFFICE VISIT (OUTPATIENT)
Dept: NEUROLOGY | Facility: CLINIC | Age: 75
End: 2025-06-27
Payer: MEDICARE

## 2025-06-27 VITALS
RESPIRATION RATE: 16 BRPM | SYSTOLIC BLOOD PRESSURE: 106 MMHG | WEIGHT: 137 LBS | HEART RATE: 88 BPM | BODY MASS INDEX: 23 KG/M2 | DIASTOLIC BLOOD PRESSURE: 64 MMHG

## 2025-06-27 DIAGNOSIS — Z79.899 ENCOUNTER FOR LONG-TERM (CURRENT) DRUG USE: ICD-10-CM

## 2025-06-27 DIAGNOSIS — G40.209 COMPLEX PARTIAL SEIZURES WITH CONSCIOUSNESS IMPAIRED (HCC): Primary | ICD-10-CM

## 2025-06-27 PROCEDURE — G2211 COMPLEX E/M VISIT ADD ON: HCPCS | Performed by: OTHER

## 2025-06-27 PROCEDURE — 99215 OFFICE O/P EST HI 40 MIN: CPT | Performed by: OTHER

## 2025-06-27 RX ORDER — LEVETIRACETAM 750 MG/1
750 TABLET ORAL 2 TIMES DAILY
Qty: 60 TABLET | Refills: 11 | Status: SHIPPED | OUTPATIENT
Start: 2025-06-27 | End: 2026-06-22

## 2025-06-27 RX ORDER — MIDAZOLAM 5 MG/.1ML
5 SPRAY NASAL AS NEEDED
Qty: 1 EACH | Refills: 0 | Status: SHIPPED | OUTPATIENT
Start: 2025-06-27

## 2025-06-27 NOTE — PROGRESS NOTES
Neurology History & Physical     ASSESSMENT & PLAN:      ICD-10-CM    1. Complex partial seizures with consciousness impaired (HCC)  G40.209 Midazolam (NAYZILAM) 5 MG/0.1ML Nasal Solution      2. Encounter for long-term (current) drug use  Z79.899 Levetiracetam, Serum        Focal seizures, epilepsy due to past left frontal lobe metastasis of bladder cancer (s/p resection, planning radiation soon).  Last seizure 6/15/25.  This poses threat to bodily function.  Cont  mg BID, level appropriate 10 days ago, but she was on 1g BID prior, so will repeat level today.  Nayzilam Rx given.    She has been on Bupropion for about 1.5 years, was lowered last week.  It has been very helpful for depression. Though it is known to lower seizure threshold, I believe it could cautiously be continued, as we have clear triggers for both seizures, though other options (SNRI/SSRI/TCA) would be preferred.  They plan to d/w psychiatry soon.    Seizure precaution information provided:  Until seizure-free / event-free for at least 6 consecutive months, the patient must take precautions, including but not limited to absolutely avoid driving; avoid the use of heavy machinery; avoid lifting more than 25 lbs; avoid alcohol and drug use; swim only with supervision (avoid lakes and oceans, and someone who knows your condition and can swim must be in the water with you), avoid tub baths, use only the back burners on the stove, avoid open flames/heights/rooftops; take precautions with children and infants; if exercising, avoid free weights and avoid treadmill, and have a  who knows your condition; avoid contact sports; use a helmet if riding bicycle.  Avoid common triggers such as sleep deprivation, low blood sugar, and missing medications.  Seizure rescue plan. I recommend to call ambulance if: shaking lasts > 5 minutes, if there is a reduction or cessation of breathing, the patient turns blue, or any other observer concern out of the  ordinary for the patient's seizures.  Paramedics can also give rescue medication.  Every seizure does not require ER visit, especially if it is brief or within the usual pattern for the patient's seizures.  Discussed risks and benefits of treatment, including but not limited to medication side-effects, including possibly life-threatening side effects and conditions.  I recommended and prescribed rescue medication to be given if shaking lasts > 5 minutes.    I intend to be providing an ongoing, continuous, and active collaborative plan of care for the problem(s) above (the management of which require my specialized clinical knowledge, skill, and expertise).      Return in about 3 months (around 9/27/2025).       ~~~~~~~~~~~~~~~~~~~~~~~~~~~    CHIEF COMPLAINT / REASON FOR VISIT:    Chief Complaint   Patient presents with    Seizures     New patient for seizures. Had brain surgery on 6/14/25. Had 3 episodes after surgery.      HISTORY OBTAINED FROM:  Patient and others as above  Chart review    HISTORY:  Sujey Medina is a 75 year old female with bladder cancer, left frontal mass s/p resection April 2025, had a GTCS 4/17/25 (1 day post op), started on LEV 1000 mg BID.   Had another GTC seizure 6/15/25, with right gaze deviation at onset.  LEV was apparently inadvertently stopped 4 days prior, at SNF.  Was restarted in ER at 750 mg BID, feels tired.  Mood is good.    May have had febrile seizure in childhood, never on meds.  No FH of seizures.    Previous medication trials:  none    Driving status:  NOT Driving    DATA REVIEWED:  As documented in the history    June 2025  NSGY note    B12, TSH unremarkable   6/17/25 LEV 40.4  BMP GFR 56  Head CT \"Stable postsurgical changes in the left frontal lobe\"  (I independently analyzed and interpreted the above, and I agree with the reading physician report(s).)    April 2025  MRI brain \"Enhancing mass in the left frontal lobe with marked adjacent vasogenic edema as described  above is concerning for primary CNS neoplasm \"  cEEG diffuse slowing  Neuro note    Oct 2022  Neuro note - seen inpatient for possible syncope, encephalopathy  MRI brain unremarkable   EEG bitemporal slowing    PHYSICAL EXAMINATION:  /64   Pulse 88   Resp 16   Wt 137 lb (62.1 kg)   LMP  (LMP Unknown)   BMI 22.80 kg/m²     Gen: in NAD  MSE: AAOx3, nl language, nl attn/conc, nl fund of knowledge  CN: PERRL, VFF; EOMI, no nystagmus; nl facial mvmt bilaterally; nl hearing bilaterally; nl palate elevation bilaterally; nl voice; nl shoulder shrug b/I; nl tongue movement  Motor: 5/5 x4; no drift; normal tone; no abnormal movements  Sensory: nl vibration x4  Coord: nl FTN b/I  Reflex: 2+ BUE and BLE  Gait: normal    No Known Allergies    Current medications:  Current Outpatient Medications on File Prior to Visit   Medication Sig Dispense Refill    buPROPion  MG Oral Tablet 12 Hr Take 1 tablet (100 mg total) by mouth daily.      acetaminophen 325 MG Oral Tab Take 2 tablets (650 mg total) by mouth every 6 (six) hours as needed for Pain.      senna-docusate 8.6-50 MG Oral Tab Take 2 tablets by mouth as needed in the morning and 2 tablets as needed in the evening for constipation.      busPIRone 5 MG Oral Tab Take 1 tablet (5 mg total) by mouth in the morning and 1 tablet (5 mg total) before bedtime.      metoprolol tartrate 25 MG Oral Tab Take 1 tablet (25 mg total) by mouth 2 (two) times daily.      acidophilus-pectin Oral Cap Take 1 capsule by mouth in the morning.      simvastatin 40 MG Oral Tab TAKE ONE TABLET (40 MG TOTAL) BY MOUTH NIGHTLY 90 tablet 3    Multiple Vitamin (MULTI-VITAMIN DAILY OR) Take 1 tablet by mouth in the morning.       No current facility-administered medications on file prior to visit.        Past Medical History:    Cancer (HCC)    CKD (chronic kidney disease), stage III (HCC)    HYPERLIPIDEMIA    Hypertension    OSTEOPENIA    Personal history of bladder cancer    S/P ileoconduit        Past Surgical History:   Procedure Laterality Date    Benign biopsy right  1988    Colon ca scrn not hi rsk ind N/A 10/28/2015    Procedure: COLONOSCOPY, POSSIBLE BIOPSY, POSSIBLE POLYPECTOMY 08836;  Surgeon: Lai Heard MD;  Location: [facility]    Colonoscopy  6/03    diverticulosis, hemorrhoids    Colonoscopy,diagnostic  10/28/15    diverticulosis    Cystoscopy,insert ureteral stent  3/12/10    Performed by BARNEY IZAGUIRRE at [facility]    Cystoscopy,insert ureteral stent  9/10/2010    Performed by BARNEY IZAGUIRRE at [facility]    Cystoscopy,remv calculus,simple  11/19/2010    Performed by BARNEY IZAGUIRRE at [facility]    Cystourethroscopy  3/2/2011    Performed by APRIL NO at [facility]    Cystourethroscopy,biopsy  11/19/2010    Performed by BARNEY IZAGUIRRE at [facility]    Cystourethroscopy,fulgur 2-5cm lesn  3/12/10    Performed by BARNEY IZAGUIRRE at [facility]    Cystourethroscopy,fulgur 2-5cm lesn  9/10/2010    Performed by BARNEY IZAGUIRRE at [facility]    Eeg phy/qhp ea incr w/veeg  4/18/2025    Fluor gid ctr vad plmt rplcmt/rmvl  3/14/2011    Performed by TANESHA ALEXANDRE at [facility]    Insertion, tunneled centrally inserted venous access device, w/subq port; >5 years  3/14/2011    Performed by TANESHA ALEXANDRE at [facility]    Other surgical history  22 yrs ago    right breast bx    Other surgical history  2-23-09    Dr. Jeremy hutton    Other surgical history  6-3-10    cysto-poss [id number]-Za. Merrick    Other surgical history  9/10/10    cysto, URS, RPG, TURBT, stent exchange-Dr. Izaguirre    Other surgical history  2/8/11    cysto- Dr. Izaguirre    Other surgical history      thoracic surgery    Other surgical history  11/20/13     VATS RUL    Patient documented not to have experienced any of the following events N/A 10/28/2015    Procedure:  COLONOSCOPY, POSSIBLE BIOPSY, POSSIBLE POLYPECTOMY 20639;  Surgeon: Lai Heard MD;  Location: [facility]    Patient withough preoperative order for iv antibiotic surgical site infection prophylaxis. N/A 10/28/2015    Procedure: COLONOSCOPY, POSSIBLE BIOPSY, POSSIBLE POLYPECTOMY 91682;  Surgeon: Lai Heard MD;  Location: [facility]    Removal of tunneled cva device, with subq port or pump, central or peripheral insertion  12/17/2013    Procedure: PORT-A-CATH REMOVAL;  Surgeon: Bobby Palmer MD;  Location: [facility]    Skin surgery  06/13/2019    MOHS, BCC - nasal dorsum, Dr. PATRICIA Ghosh    Tubal ligation      X-ray retrograde pyelogram  3/12/10    Performed by BARNEY IZAGUIRRE at [facility]    X-ray retrograde pyelogram  9/10/2010    Performed by BARNEY IZAGUIRRE at [facility]       Social History     Socioeconomic History    Marital status:     Number of children: 0   Occupational History    Occupation: Semi retired    Tobacco Use    Smoking status: Never    Smokeless tobacco: Never   Vaping Use    Vaping status: Never Used   Substance and Sexual Activity    Alcohol use: Not Currently     Alcohol/week: 2.0 standard drinks of alcohol     Types: 2 Standard drinks or equivalent per week    Drug use: No   Other Topics Concern    Caffeine Concern Yes    Exercise No    Seat Belt Yes       Family History   Problem Relation Age of Onset    Other (Other) Father         suicide    Heart Disorder Mother     Heart Disorder Maternal Grandmother     Other (Other) Sister         mentally disabled    Cancer Neg        Pavel Mercado MD, FAES, FAAN  Board-Certified in Neurology, Epilepsy, and Clinical Neurophysiology  [facility]

## 2025-06-27 NOTE — PATIENT INSTRUCTIONS
Instructions from Dr. Mercado:       She has been on Bupropion for about 1.5 years, was lowered last week.  It has been very helpful for depression. Though it is known to lower seizure threshold, I believe it could cautiously be continued, as we have clear triggers for both seizures, and we are going to continue Keppra (which so far has not negatively impacted mood).  If your psychiatrist has another option such as SNRI or SSRI or TCA, those would be viable options.    Seizure precaution information provided:  Until seizure-free / event-free for at least 6 consecutive months, the patient must take precautions, including but not limited to absolutely avoid driving; avoid the use of heavy machinery; avoid lifting more than 25 lbs; avoid alcohol and drug use; swim only with supervision (avoid lakes and oceans, and someone who knows your condition and can swim must be in the water with you), avoid tub baths, use only the back burners on the stove, avoid open flames/heights/rooftops; take precautions with children and infants; if exercising, avoid free weights and avoid treadmill, and have a  who knows your condition; avoid contact sports; use a helmet if riding bicycle.  Avoid common triggers such as sleep deprivation, low blood sugar, and missing medications.  Seizure rescue plan. I recommend to call ambulance if: shaking lasts > 5 minutes, if there is a reduction or cessation of breathing, the patient turns blue, or any other observer concern out of the ordinary for the patient's seizures.  Paramedics can also give rescue medication.  Every seizure does not require ER visit, especially if it is brief or within the usual pattern for the patient's seizures.  I recommended and prescribed rescue medication to be given if shaking lasts > 5 minutes.        ~~~~~~~~~~~~~~~~~~~~~~~    Refill policies:     Contact your pharmacy at least 5 days prior to running out of medication and have them send an electronic request or  Patient updated on POC, transfer to [facility] ER at 0300. Patient and family verbalized understanding. submit request through the “request refill” option in your [employer] account.  Allow 3-5 business days for refills; controlled substances may take longer.  If your prescription is due for a refill, please make sure you have a follow up appointment scheduled with the appropriate prescribing physician.  To best provide you care, patients receiving routine medications need to be seen at least once a year.  Patients receiving narcotic/controlled substance medications need to be seen at least once every 3 months.  Patients receiving narcotic/controlled substance medications will be required to sign an Opioid Treatment Agreement/Contract.  Refills will not be refilled on weekends or holidays; on-call physicians will not refill routine medications.  No narcotics or controlled substances are refilled after noon on Fridays or by on-call physicians.  Federal Law states narcotics must be electronically prescribed.  A 30-day supply with no refills is the maximum allowed by law.  In the event that your preferred pharmacy does not have the requested medication in stock (e.g., Backordered), it is your responsibility to find another pharmacy that has the requested medication available.  We will gladly send a new prescription to that pharmacy at your request.

## 2025-07-30 ENCOUNTER — TELEPHONE (OUTPATIENT)
Dept: SURGERY | Facility: CLINIC | Age: 75
End: 2025-07-30

## 2025-07-30 DIAGNOSIS — Z98.890 HISTORY OF CRANIOTOMY: Primary | ICD-10-CM

## 2025-07-30 DIAGNOSIS — Z79.01 ANTICOAGULATED: ICD-10-CM

## 2025-08-05 ENCOUNTER — HOSPITAL ENCOUNTER (OUTPATIENT)
Dept: CT IMAGING | Facility: HOSPITAL | Age: 75
Discharge: HOME | End: 2025-08-05
Attending: PHYSICIAN ASSISTANT

## 2025-08-05 DIAGNOSIS — Z79.01 ANTICOAGULATED: ICD-10-CM

## 2025-08-05 DIAGNOSIS — Z98.890 HISTORY OF CRANIOTOMY: ICD-10-CM

## 2025-08-05 PROCEDURE — 70450 CT HEAD/BRAIN W/O DYE: CPT | Performed by: PHYSICIAN ASSISTANT

## (undated) DEVICE — ADHESIVE SKIN TOP FOR WND CLSR DERMBND ADV

## (undated) DEVICE — CONTAINER,SPECIMEN,PNEU TUBE,4OZ,OR STRL: Brand: MEDLINE

## (undated) DEVICE — GLOVE,SURG,SENSICARE SLT,LF,PF,8: Brand: MEDLINE

## (undated) DEVICE — SOLUTION IRRIG 1000ML 0.9% NACL USP BTL

## (undated) DEVICE — MARKER SKIN PREP-RESISTANT ST: Brand: MEDLINE INDUSTRIES, INC.

## (undated) DEVICE — COTTON BALLS-STRUNG 1/2": Brand: COTTON BALLS

## (undated) DEVICE — SOLUTION IRRIG 1000ML ST H2O AQUALITE PLAS

## (undated) DEVICE — RETRACTOR STAY HOOK 12MM LONE STAR YELLOW DISP

## (undated) DEVICE — SPHERE NAVI 4 MRK PASS STLTH STN

## (undated) DEVICE — 12CM IQ STANDARD: Brand: SONOPET IQ

## (undated) DEVICE — CATHETER IV 14GA L5.25IN ANGIOCATH STR FEP

## (undated) DEVICE — SYRINGE MED 20ML STD CLR PLAS LL TIP N CTRL

## (undated) DEVICE — CRANIOTOMY CDS: Brand: MEDLINE INDUSTRIES, INC.

## (undated) DEVICE — ANTISEPTIC 4OZ 3% H2O2 1ST AID ORAL DEBRIDING

## (undated) DEVICE — 4-PORT MANIFOLD: Brand: NEPTUNE 2

## (undated) DEVICE — ZEISS MD DRAPE

## (undated) DEVICE — IRRIGATION SUCTION CASSETTE: Brand: SONOPET IQ

## (undated) DEVICE — BLADE ELECTRODE: Brand: EDGE

## (undated) DEVICE — SLEEVE COMPR MD KNEE LEN SGL USE KENDALL SCD

## (undated) DEVICE — GOWN SUR 2XL LEV 4 BLU W/ WHT V NK BND AERO

## (undated) DEVICE — SOLUTION IV 500ML 0.9% NACL FLX CONT

## (undated) DEVICE — COVER,LIGHT,CAMERA,HARD,1/PK,STRL: Brand: MEDLINE

## (undated) DEVICE — SPECIMEN SOCK - STANDARD: Brand: MEDI-VAC

## (undated) DEVICE — SYRINGE MED 10ML LL TIP W/O SFTY DISP

## (undated) DEVICE — CODMAN® SURGICAL PATTIES 1/2" X 1/2" (1.27CM X 1.27CM): Brand: CODMAN®

## (undated) DEVICE — SUT VCRL 0 18IN CT-2 ABSRB VLT CR L26MM 1/2

## (undated) DEVICE — 9.0MM ACORN

## (undated) DEVICE — 2.3MM SPIRAL ROUTER

## (undated) DEVICE — BATTERY PACK FOR VARISPEED: Brand: STRYKER VARISPEED

## (undated) DEVICE — ANTISEPTIC 4OZ 70% ISO ALC

## (undated) DEVICE — SUCTION CANISTER, DISPOSABLE

## (undated) DEVICE — SPONGE,NEURO,0.5"X1.5",XR,STRL,LF,10/PK: Brand: MEDLINE

## (undated) DEVICE — PAD SACRAL SPAN AID

## (undated) DEVICE — SUT VCRL RAPIDE 3-0 18IN ABSRB UD L19MM

## (undated) DEVICE — SUT VCRL 2-0 18IN ABSRB UD CR L26MM CT-2

## (undated) NOTE — Clinical Note
April 10, 2025    Patient: Sujey Medina   Date of Visit: 4/10/2025       To Whom It May Concern:    Sujey Medina was seen and treated in our emergency department on 4/10/2025. She {Return to school/sport/work:[id number]}.    If you have any questions or concerns, please don't hesitate to call.       Encounter Provider(s):    Norman López MD Azeem, Tahseen, DO

## (undated) NOTE — LETTER
[address]  [address]  Authorization for Surgical Operation and Procedure     Date: 4/15/2025                                                                                                      Time: 1300  I hereby authorize Surgeon(s):  Candido Daigle MD, my physician and his/her assistants (if applicable), which may include medical students, residents, and/or fellows, to perform the following surgical operation/ procedure and administer such anesthesia as may be determined necessary by my physician:  Operation/Procedure name (s) Procedure(s):  LEFT IMAGE GUIDED CRANIOTOMY FOR TUMOR  NAVIGATION SYSTEM NEURO on Sujey Medina   2.   I recognize that during the surgical operation/procedure, unforeseen conditions may necessitate additional or different procedures than those listed above.  I, therefore, further authorize and request that the above-named surgeon, assistants, or designees perform such procedures as are, in their judgment, necessary and desirable.    3.   My surgeon/physician has discussed prior to my surgery the potential benefits, risks and side effects of this procedure; the likelihood of achieving goals; and potential problems that might occur during recuperation.  They also discussed reasonable alternatives to the procedure, including risks, benefits, and side effects related to the alternatives and risks related to not receiving this procedure.  I have had all my questions answered and I acknowledge that no guarantee has been made as to the result that may be obtained.    4.   Should the need arise during my operation/procedure, which includes change of level of care prior to discharge, I also consent to the administration of blood and/or blood products.  Further, I understand that despite careful testing and screening of blood or blood products by collecting agencies, I may still be subject to ill effects as a result of receiving a blood transfusion  and/or blood products.  The following are some, but not all, of the potential risks that can occur: fever and allergic reactions, hemolytic reactions, transmission of diseases such as Hepatitis, AIDS and Cytomegalovirus (CMV) and fluid overload.  In the event that I wish to have an autologous transfusion of my own blood, or a directed donor transfusion, I will discuss this with my physician.  Check only if Refusing Blood or Blood Products  I understand refusal of blood or blood products as deemed necessary by my physician may have serious consequences to my condition to include possible death. I hereby assume responsibility for my refusal and release the hospital, its personnel, and my physicians from any responsibility for the consequences of my refusal.          o  Refuse      5.   I authorize the use of any specimen, organs, tissues, body parts or foreign objects that may be removed from my body during the operation/procedure for diagnosis, research or teaching purposes and their subsequent disposal by hospital authorities.  I also authorize the release of specimen test results and/or written reports to my treating physician on the hospital medical staff or other referring or consulting physicians involved in my care, at the discretion of the Pathologist or my treating physician.    6.   I consent to the photographing or videotaping of the operations or procedures to be performed, including appropriate portions of my body for medical, scientific, or educational purposes, provided my identity is not revealed by the pictures or by descriptive texts accompanying them.  If the procedure has been photographed/videotaped, the surgeon will obtain the original picture, image, videotape or CD.  The hospital will not be responsible for storage, release or maintenance of the picture, image, tape or CD.    7.   I consent to the presence of a  or observers in the operating room as deemed necessary by my  physician or their designees.    8.   I recognize that in the event my procedure results in extended X-Ray/fluoroscopy time, I may develop a skin reaction.    9. If I have a Do Not Attempt Resuscitation (DNAR) order in place, that status will be suspended while in the operating room, procedural suite, and during the recovery period unless otherwise explicitly stated by me (or a person authorized to consent on my behalf). The surgeon or my attending physician will determine when the applicable recovery period ends for purposes of reinstating the DNAR order.  10. Patients having a sterilization procedure: I understand that if the procedure is successful the results will be permanent and it will therefore be impossible for me to inseminate, conceive, or bear children.  I also understand that the procedure is intended to result in sterility, although the result has not been guaranteed.   11. I acknowledge that my physician has explained sedation/analgesia administration to me including the risk and benefits I consent to the administration of sedation/analgesia as may be necessary or desirable in the judgment of my physician.    I CERTIFY THAT I HAVE READ AND FULLY UNDERSTAND THE ABOVE CONSENT TO OPERATION and/or OTHER PROCEDURE.    _________________________________________  __________________________________  Signature of Patient     Signature of Responsible Person         ___________________________________         Printed Name of Responsible Person           _________________________________                 Relationship to Patient  _________________________________________  ______________________________  Signature of Witness          Date  Time      Patient Name: Sujey ERAZO Adam     : 3/6/1950                 Printed: April 15, 2025     Medical Record #: [id number]                     Page 1 of 2                                    [address]  [address]    Consent for  Anesthesia    I, Sujey Medina agree to be cared for by an anesthesiologist, who is specially trained to monitor me and give me medicine to put me to sleep or keep me comfortable during my procedure    I understand that my anesthesiologist is not an employee or agent of [facility] or [employer]. He or she works for [employer].    As the patient asking for anesthesia services, I agree to:  Allow the anesthesiologist (anesthesia doctor) to give me medicine and do additional procedures as necessary. Some examples are: Starting or using an “IV” to give me medicine, fluids or blood during my procedure, and having a breathing tube placed to help me breathe when I’m asleep (intubation). In the event that my heart stops working properly, I understand that my anesthesiologist will make every effort to sustain my life, unless otherwise directed by [facility] Do Not Resuscitate documents.  Tell my anesthesia doctor before my procedure:  If I am pregnant.  The last time that I ate or drank.  All of the medicines I take (including prescriptions, herbal supplements, and pills I can buy without a prescription (including street drugs/illegal medications). Failure to inform my anesthesiologist about these medicines may increase my risk of anesthetic complications.  If I am allergic to anything or have had a reaction to anesthesia before.  I understand how the anesthesia medicine will help me (benefits).  I understand that with any type of anesthesia medicine there are risks:  The most common risks are: nausea, vomiting, sore throat, muscle soreness, damage to my eyes, mouth, or teeth (from breathing tube placement).  Rare risks include: remembering what happened during my procedure, allergic reactions to medications, injury to my airway, heart, lungs, vision, nerves, or muscles and in extremely rare instances death.  My doctor has explained to me other choices available to me for my care  (alternatives).  Pregnant Patients (“epidural”):  I understand that the risks of having an epidural (medicine given into my back to help control pain during labor), include itching, low blood pressure, difficulty urinating, headache or slowing of the baby’s heart. Very rare risks include infection, bleeding, seizure, irregular heart rhythms and nerve injury.  Regional Anesthesia (“spinal”, “epidural”, & “nerve blocks”):  I understand that rare but potential complications include headache, bleeding, infection, seizure, irregular heart rhythms, and nerve injury.    I can change my mind about having anesthesia services at any time before I get the medicine.    _____________________________________________________________________________  Patient (or Representative) Signature/Relationship to Patient  Date   Time    _____________________________________________________________________________   Name (if used)    Language/Organization   Time    _____________________________________________________________________________  Anesthesiologist Signature     Date   Time  I have discussed the procedure and information above with the patient (or patient’s representative) and answered their questions. The patient or their representative has agreed to have anesthesia services.    _____________________________________________________________________________  Witness        Date   Time  I have verified that the signature is that of the patient or patient’s representative, and that it was signed before the procedure  Patient Name: Sujey Medina     : 3/6/1950                 Printed: April 15, 2025     Medical Record #: [id number]                     Page 2 of 2

## (undated) NOTE — IP AVS SNAPSHOT
[address]            (For Outpatient Use Only) Initial Admit Date: 10/20/2022   Inpt/Obs Admit Date: Inpt: 10/22/22 / Obs: 10/20/22   Discharge Date:    Edmond Stein:  [de-identified]   MRN: [de-identified]   CSN: [id number]   CEID: [id number]        ENCOUNTER  Patient Class: Inpatient Admitting Provider: Shu Beltran MD Unit: [place] [address] Service: Cardiac Telemetry Attending Provider: Ivory Hackett MD   Bed: [address]   Visit Type:   Referring Physician: No ref. provider found Billing Flag:    Admit Diagnosis: Syncope, near [R55]      PATIENT  Legal Name:   Yoselin Cheung   Legal Sex: Female  Gender ID: Female             [address] [facility] Floor Name:    PCP:  Joce Gonzalez MD Home: [phone]   Address:  [address] [place] [address] : 3/6/1950 (72 yrs) Mobile: [phone]         City/State/Zip: [address] [place] [address], [address] [place] [address] Marital:  Language: Asia sebastián: Krishna SSN4: [id number] Restorationist: No Restorationist Indicated/Gi*     Race: White Ethnicity: Non  Or [address] [place] [address] CONTACT   Name Relationship Legal Guardian? Home Phone Work Phone Mobile Phone   1.  EMANUEL YEH  2. *No Contact Specified* Spouse                   GUARANTOR  Guarantor: Aly Hernandez : 8473 Home Phone: [phone]   Address: [address] [place] [address]   Sex: Female Work Phone:    City/State/Zip: [address] [place] [address]   Rel. to Patient: Self Guarantor ID: [id number]   GUARANTOR EMPLOYER   Employer:  Status: RETIRED     COVERAGE  PRIMARY INSURANCE   Payor: MEDICARE Plan: MEDICARE PART A&B   Group Number:  Insurance Type: Taina [id number] Name: Ryann Marquez : 1950   Subscriber ID: [id number] Pt Rel to Subscriber: Self   SECONDARY INSURANCE   Payor: [employer] Plan: [address] [place] [address]   Group Number: [id number] Insurance Type: Taina [id number] Name: Ryann Marquez : 3/6/1950   Subscriber ID: [id number] Pt Rel to Subscriber: SELF   TERTIARY INSURANCE   Payor: Plan:    Group Number:  Insurance Type:    Subscriber Name:  Subscriber :    Subscriber ID:  Pt Rel to Subscriber:    Hospital Account Financial Class: Medicare    2022

## (undated) NOTE — Clinical Note
Date & Time: 4/10/2025, 11:38 PM  Patient: Sujey Medina  Encounter Provider(s):    Norman López MD Azeem, Tahseen, DO       To Whom It May Concern:    Sujey Medina was seen and treated in our department on 4/10/2025. She {Return to school/sport/work:[id number]}.    If you have any questions or concerns, please do not hesitate to call.        _____________________________  Physician/APC Signature

## (undated) NOTE — Clinical Note
Date & Time: 4/10/2025, 11:39 PM  Patient: Sujey Medina  Encounter Provider(s):    Norman López MD Azeem, Tahseen, DO       To Whom It May Concern:    Sujey Medina was seen and treated in our department on 4/10/2025. She {Return to school/sport/work:[id number]}.    If you have any questions or concerns, please do not hesitate to call.        _____________________________  Physician/APC Signature

## (undated) NOTE — Clinical Note
[address]  [address]  Authorization for Surgical Operation and Procedure     Date:___________                                                                                                         Time:__________  1. I hereby authorize * Surgery not found *, my physician and his/her assistants (if applicable), which may include medical students, residents, and/or fellows, to perform the following surgical operation/ procedure and administer such anesthesia as may be determined necessary by my physician:  Operation/Procedure name (s)  on Sujey Medina   2.   I recognize that during the surgical operation/procedure, unforeseen conditions may necessitate additional or different procedures than those listed above.  I, therefore, further authorize and request that the above-named surgeon, assistants, or designees perform such procedures as are, in their judgment, necessary and desirable.    3.   My surgeon/physician has discussed prior to my surgery the potential benefits, risks and side effects of this procedure; the likelihood of achieving goals; and potential problems that might occur during recuperation.  They also discussed reasonable alternatives to the procedure, including risks, benefits, and side effects related to the alternatives and risks related to not receiving this procedure.  I have had all my questions answered and I acknowledge that no guarantee has been made as to the result that may be obtained.    4.   Should the need arise during my operation/procedure, which includes change of level of care prior to discharge, I also consent to the administration of blood and/or blood products.  Further, I understand that despite careful testing and screening of blood or blood products by collecting agencies, I may still be subject to ill effects as a result of receiving a blood transfusion and/or blood products.  The following are some, but not all, of the potential  risks that can occur: fever and allergic reactions, hemolytic reactions, transmission of diseases such as Hepatitis, AIDS and Cytomegalovirus (CMV) and fluid overload.  In the event that I wish to have an autologous transfusion of my own blood, or a directed donor transfusion, I will discuss this with my physician.  Check only if Refusing Blood or Blood Products  I understand refusal of blood or blood products as deemed necessary by my physician may have serious consequences to my condition to include possible death. I hereby assume responsibility for my refusal and release the hospital, its personnel, and my physicians from any responsibility for the consequences of my refusal.          o  Refuse      5.   I authorize the use of any specimen, organs, tissues, body parts or foreign objects that may be removed from my body during the operation/procedure for diagnosis, research or teaching purposes and their subsequent disposal by hospital authorities.  I also authorize the release of specimen test results and/or written reports to my treating physician on the hospital medical staff or other referring or consulting physicians involved in my care, at the discretion of the Pathologist or my treating physician.    6.   I consent to the photographing or videotaping of the operations or procedures to be performed, including appropriate portions of my body for medical, scientific, or educational purposes, provided my identity is not revealed by the pictures or by descriptive texts accompanying them.  If the procedure has been photographed/videotaped, the surgeon will obtain the original picture, image, videotape or CD.  The hospital will not be responsible for storage, release or maintenance of the picture, image, tape or CD.    7.   I consent to the presence of a  or observers in the operating room as deemed necessary by my physician or their designees.    8.   I recognize that in the event my procedure  results in extended X-Ray/fluoroscopy time, I may develop a skin reaction.    9. If I have a Do Not Attempt Resuscitation (DNAR) order in place, that status will be suspended while in the operating room, procedural suite, and during the recovery period unless otherwise explicitly stated by me (or a person authorized to consent on my behalf). The surgeon or my attending physician will determine when the applicable recovery period ends for purposes of reinstating the DNAR order.  10. Patients having a sterilization procedure: I understand that if the procedure is successful the results will be permanent and it will therefore be impossible for me to inseminate, conceive, or bear children.  I also understand that the procedure is intended to result in sterility, although the result has not been guaranteed.   11. I acknowledge that my physician has explained sedation/analgesia administration to me including the risk and benefits I consent to the administration of sedation/analgesia as may be necessary or desirable in the judgment of my physician.    I CERTIFY THAT I HAVE READ AND FULLY UNDERSTAND THE ABOVE CONSENT TO OPERATION and/or OTHER PROCEDURE.    _________________________________________  __________________________________  Signature of Patient     Signature of Responsible Person         ___________________________________         Printed Name of Responsible Person           _________________________________                 Relationship to Patient  _________________________________________  ______________________________  Signature of Witness          Date  Time      Patient Name: Sujey Medina     : 3/6/1950                 Printed: April 10, 2025     Medical Record #: [id number]                     Page 1 [address]  [address]    Consent for Anesthesia    1. ISujey agree to be cared for by an anesthesiologist, who is  specially trained to monitor me and give me medicine to put me to sleep or keep me comfortable during my procedure    I understand that my anesthesiologist is not an employee or agent of [facility] or [employer]. He or she works for [employer].    2. As the patient asking for anesthesia services, I agree to:  a. Allow the anesthesiologist (anesthesia doctor) to give me medicine and do additional procedures as necessary. Some examples are: Starting or using an “IV” to give me medicine, fluids or blood during my procedure, and having a breathing tube placed to help me breathe when I’m asleep (intubation). In the event that my heart stops working properly, I understand that my anesthesiologist will make every effort to sustain my life, unless otherwise directed by [facility] Do Not Resuscitate documents.  b. Tell my anesthesia doctor before my procedure:  i. If I am pregnant.  ii. The last time that I ate or drank.  iii. All of the medicines I take (including prescriptions, herbal supplements, and pills I can buy without a prescription (including street drugs/illegal medications). Failure to inform my anesthesiologist about these medicines may increase my risk of anesthetic complications.  iv. If I am allergic to anything or have had a reaction to anesthesia before.  3. I understand how the anesthesia medicine will help me (benefits).  4. I understand that with any type of anesthesia medicine there are risks:  a. The most common risks are: nausea, vomiting, sore throat, muscle soreness, damage to my eyes, mouth, or teeth (from breathing tube placement).  b. Rare risks include: remembering what happened during my procedure, allergic reactions to medications, injury to my airway, heart, lungs, vision, nerves, or muscles and in extremely rare instances death.  5. My doctor has explained to me other choices available to me for my care (alternatives).  6. Pregnant Patients  (“epidural”):  I understand that the risks of having an epidural (medicine given into my back to help control pain during labor), include itching, low blood pressure, difficulty urinating, headache or slowing of the baby’s heart. Very rare risks include infection, bleeding, seizure, irregular heart rhythms and nerve injury.  7. Regional Anesthesia (“spinal”, “epidural”, & “nerve blocks”):  I understand that rare but potential complications include headache, bleeding, infection, seizure, irregular heart rhythms, and nerve injury.    I can change my mind about having anesthesia services at any time before I get the medicine.    _____________________________________________________________________________  Patient (or Representative) Signature/Relationship to Patient  Date   Time    _____________________________________________________________________________   Name (if used)    Language/Organization   Time    _____________________________________________________________________________  Anesthesiologist Signature     Date   Time  I have discussed the procedure and information above with the patient (or patient’s representative) and answered their questions. The patient or their representative has agreed to have anesthesia services.    _____________________________________________________________________________  Witness        Date   Time  I have verified that the signature is that of the patient or patient’s representative, and that it was signed before the procedure  Patient Name: Suejy Medina     : 3/6/1950                 Printed: April 10, 2025     Medical Record #: [id number]                     Page 2 of 2